# Patient Record
Sex: FEMALE | Race: WHITE | NOT HISPANIC OR LATINO | Employment: OTHER | URBAN - METROPOLITAN AREA
[De-identification: names, ages, dates, MRNs, and addresses within clinical notes are randomized per-mention and may not be internally consistent; named-entity substitution may affect disease eponyms.]

---

## 2018-06-16 ENCOUNTER — APPOINTMENT (OUTPATIENT)
Dept: GENERAL RADIOLOGY | Facility: HOSPITAL | Age: 83
End: 2018-06-16

## 2018-06-16 ENCOUNTER — HOSPITAL ENCOUNTER (INPATIENT)
Facility: HOSPITAL | Age: 83
LOS: 4 days | Discharge: HOME | End: 2018-06-20
Attending: EMERGENCY MEDICINE | Admitting: INTERNAL MEDICINE

## 2018-06-16 DIAGNOSIS — A41.9 SEPSIS, DUE TO UNSPECIFIED ORGANISM: Primary | ICD-10-CM

## 2018-06-16 DIAGNOSIS — Z74.09 IMPAIRED FUNCTIONAL MOBILITY, BALANCE, GAIT, AND ENDURANCE: ICD-10-CM

## 2018-06-16 DIAGNOSIS — R41.82 ALTERED MENTAL STATUS, UNSPECIFIED ALTERED MENTAL STATUS TYPE: ICD-10-CM

## 2018-06-16 PROBLEM — A40.3 SEPSIS DUE TO PNEUMOCOCCUS (HCC): Status: ACTIVE | Noted: 2018-06-16

## 2018-06-16 PROBLEM — A40.3 SEPSIS DUE TO STREPTOCOCCUS PNEUMONIAE (HCC): Status: ACTIVE | Noted: 2018-06-16

## 2018-06-16 PROBLEM — J98.11 ATELECTASIS OF BOTH LUNGS: Status: ACTIVE | Noted: 2018-06-16

## 2018-06-16 PROBLEM — D51.9 ANEMIA DUE TO VITAMIN B12 DEFICIENCY: Status: ACTIVE | Noted: 2018-06-16

## 2018-06-16 LAB
ALBUMIN SERPL-MCNC: 4.09 G/DL (ref 3.2–4.8)
ALBUMIN/GLOB SERPL: 1.5 G/DL (ref 1.5–2.5)
ALP SERPL-CCNC: 64 U/L (ref 25–100)
ALT SERPL W P-5'-P-CCNC: 14 U/L (ref 7–40)
ANION GAP SERPL CALCULATED.3IONS-SCNC: 6 MMOL/L (ref 3–11)
AST SERPL-CCNC: 22 U/L (ref 0–33)
BACTERIA UR QL AUTO: NORMAL /HPF
BASOPHILS # BLD AUTO: 0.03 10*3/MM3 (ref 0–0.2)
BASOPHILS NFR BLD AUTO: 0.3 % (ref 0–1)
BILIRUB SERPL-MCNC: 1.1 MG/DL (ref 0.3–1.2)
BILIRUB UR QL STRIP: NEGATIVE
BUN BLD-MCNC: 21 MG/DL (ref 9–23)
BUN/CREAT SERPL: 19.6 (ref 7–25)
CALCIUM SPEC-SCNC: 8.9 MG/DL (ref 8.7–10.4)
CHLORIDE SERPL-SCNC: 107 MMOL/L (ref 99–109)
CLARITY UR: CLEAR
CO2 SERPL-SCNC: 26 MMOL/L (ref 20–31)
COLOR UR: YELLOW
CREAT BLD-MCNC: 1.07 MG/DL (ref 0.6–1.3)
D-LACTATE SERPL-SCNC: 1.4 MMOL/L (ref 0.5–2)
DEPRECATED RDW RBC AUTO: 65 FL (ref 37–54)
EOSINOPHIL # BLD AUTO: 0.05 10*3/MM3 (ref 0–0.3)
EOSINOPHIL NFR BLD AUTO: 0.6 % (ref 0–3)
ERYTHROCYTE [DISTWIDTH] IN BLOOD BY AUTOMATED COUNT: 16.9 % (ref 11.3–14.5)
GFR SERPL CREATININE-BSD FRML MDRD: 48 ML/MIN/1.73
GFR SERPL CREATININE-BSD FRML MDRD: 58 ML/MIN/1.73
GLOBULIN UR ELPH-MCNC: 2.7 GM/DL
GLUCOSE BLD-MCNC: 116 MG/DL (ref 70–100)
GLUCOSE UR STRIP-MCNC: NEGATIVE MG/DL
HCT VFR BLD AUTO: 24.1 % (ref 34.5–44)
HCT VFR BLD AUTO: 28.5 % (ref 34.5–44)
HGB BLD-MCNC: 8 G/DL (ref 11.5–15.5)
HGB BLD-MCNC: 9.5 G/DL (ref 11.5–15.5)
HGB UR QL STRIP.AUTO: NEGATIVE
HOLD SPECIMEN: NORMAL
HOLD SPECIMEN: NORMAL
HYALINE CASTS UR QL AUTO: NORMAL /LPF
IMM GRANULOCYTES # BLD: 0.02 10*3/MM3 (ref 0–0.03)
IMM GRANULOCYTES NFR BLD: 0.2 % (ref 0–0.6)
KETONES UR QL STRIP: NEGATIVE
LEUKOCYTE ESTERASE UR QL STRIP.AUTO: NEGATIVE
LYMPHOCYTES # BLD AUTO: 0.47 10*3/MM3 (ref 0.6–4.8)
LYMPHOCYTES NFR BLD AUTO: 5.4 % (ref 24–44)
MCH RBC QN AUTO: 35.8 PG (ref 27–31)
MCHC RBC AUTO-ENTMCNC: 33.3 G/DL (ref 32–36)
MCV RBC AUTO: 107.5 FL (ref 80–99)
MONOCYTES # BLD AUTO: 0.59 10*3/MM3 (ref 0–1)
MONOCYTES NFR BLD AUTO: 6.8 % (ref 0–12)
NEUTROPHILS # BLD AUTO: 7.57 10*3/MM3 (ref 1.5–8.3)
NEUTROPHILS NFR BLD AUTO: 86.9 % (ref 41–71)
NITRITE UR QL STRIP: NEGATIVE
PH UR STRIP.AUTO: 6 [PH] (ref 5–8)
PLATELET # BLD AUTO: 152 10*3/MM3 (ref 150–450)
PMV BLD AUTO: 11.8 FL (ref 6–12)
POTASSIUM BLD-SCNC: 4.4 MMOL/L (ref 3.5–5.5)
PROCALCITONIN SERPL-MCNC: <0.05 NG/ML
PROT SERPL-MCNC: 6.8 G/DL (ref 5.7–8.2)
PROT UR QL STRIP: ABNORMAL
RBC # BLD AUTO: 2.65 10*6/MM3 (ref 3.89–5.14)
RBC # UR: NORMAL /HPF
REF LAB TEST METHOD: NORMAL
SODIUM BLD-SCNC: 139 MMOL/L (ref 132–146)
SP GR UR STRIP: 1.02 (ref 1–1.03)
SQUAMOUS #/AREA URNS HPF: NORMAL /HPF
TROPONIN I SERPL-MCNC: 0.03 NG/ML (ref 0–0.07)
UROBILINOGEN UR QL STRIP: ABNORMAL
WBC NRBC COR # BLD: 8.71 10*3/MM3 (ref 3.5–10.8)
WBC UR QL AUTO: NORMAL /HPF
WHOLE BLOOD HOLD SPECIMEN: NORMAL
WHOLE BLOOD HOLD SPECIMEN: NORMAL

## 2018-06-16 PROCEDURE — 99285 EMERGENCY DEPT VISIT HI MDM: CPT

## 2018-06-16 PROCEDURE — 25010000002 VANCOMYCIN: Performed by: EMERGENCY MEDICINE

## 2018-06-16 PROCEDURE — 25010000002 PIPERACILLIN SOD-TAZOBACTAM PER 1 G: Performed by: EMERGENCY MEDICINE

## 2018-06-16 PROCEDURE — 87486 CHLMYD PNEUM DNA AMP PROBE: CPT | Performed by: FAMILY MEDICINE

## 2018-06-16 PROCEDURE — 25010000002 PIPERACILLIN SOD-TAZOBACTAM PER 1 G: Performed by: FAMILY MEDICINE

## 2018-06-16 PROCEDURE — 94799 UNLISTED PULMONARY SVC/PX: CPT

## 2018-06-16 PROCEDURE — 94640 AIRWAY INHALATION TREATMENT: CPT

## 2018-06-16 PROCEDURE — 73521 X-RAY EXAM HIPS BI 2 VIEWS: CPT

## 2018-06-16 PROCEDURE — 84145 PROCALCITONIN (PCT): CPT | Performed by: EMERGENCY MEDICINE

## 2018-06-16 PROCEDURE — 85014 HEMATOCRIT: CPT | Performed by: FAMILY MEDICINE

## 2018-06-16 PROCEDURE — 87633 RESP VIRUS 12-25 TARGETS: CPT | Performed by: FAMILY MEDICINE

## 2018-06-16 PROCEDURE — 87798 DETECT AGENT NOS DNA AMP: CPT | Performed by: FAMILY MEDICINE

## 2018-06-16 PROCEDURE — 87040 BLOOD CULTURE FOR BACTERIA: CPT | Performed by: EMERGENCY MEDICINE

## 2018-06-16 PROCEDURE — 85025 COMPLETE CBC W/AUTO DIFF WBC: CPT | Performed by: EMERGENCY MEDICINE

## 2018-06-16 PROCEDURE — 93005 ELECTROCARDIOGRAM TRACING: CPT | Performed by: EMERGENCY MEDICINE

## 2018-06-16 PROCEDURE — 84484 ASSAY OF TROPONIN QUANT: CPT

## 2018-06-16 PROCEDURE — 81001 URINALYSIS AUTO W/SCOPE: CPT | Performed by: EMERGENCY MEDICINE

## 2018-06-16 PROCEDURE — 99223 1ST HOSP IP/OBS HIGH 75: CPT | Performed by: FAMILY MEDICINE

## 2018-06-16 PROCEDURE — 71045 X-RAY EXAM CHEST 1 VIEW: CPT

## 2018-06-16 PROCEDURE — 25010000002 HEPARIN (PORCINE) PER 1000 UNITS: Performed by: FAMILY MEDICINE

## 2018-06-16 PROCEDURE — 80053 COMPREHEN METABOLIC PANEL: CPT | Performed by: EMERGENCY MEDICINE

## 2018-06-16 PROCEDURE — 83605 ASSAY OF LACTIC ACID: CPT | Performed by: EMERGENCY MEDICINE

## 2018-06-16 PROCEDURE — 85018 HEMOGLOBIN: CPT | Performed by: FAMILY MEDICINE

## 2018-06-16 PROCEDURE — 87581 M.PNEUMON DNA AMP PROBE: CPT | Performed by: FAMILY MEDICINE

## 2018-06-16 PROCEDURE — P9612 CATHETERIZE FOR URINE SPEC: HCPCS

## 2018-06-16 RX ORDER — POLYETHYLENE GLYCOL 3350 17 G/17G
17 POWDER, FOR SOLUTION ORAL DAILY
Status: ON HOLD | COMMUNITY
End: 2018-06-20

## 2018-06-16 RX ORDER — MIRTAZAPINE 15 MG/1
15 TABLET, FILM COATED ORAL NIGHTLY
Status: DISCONTINUED | OUTPATIENT
Start: 2018-06-16 | End: 2018-06-20 | Stop reason: HOSPADM

## 2018-06-16 RX ORDER — OXYBUTYNIN CHLORIDE 5 MG/1
5 TABLET ORAL DAILY
Status: ON HOLD | COMMUNITY
End: 2018-06-20

## 2018-06-16 RX ORDER — SODIUM CHLORIDE 0.9 % (FLUSH) 0.9 %
10 SYRINGE (ML) INJECTION AS NEEDED
Status: DISCONTINUED | OUTPATIENT
Start: 2018-06-16 | End: 2018-06-20 | Stop reason: HOSPADM

## 2018-06-16 RX ORDER — BISACODYL 10 MG
10 SUPPOSITORY, RECTAL RECTAL DAILY
Status: ON HOLD | COMMUNITY
End: 2018-06-20

## 2018-06-16 RX ORDER — SODIUM CHLORIDE 9 MG/ML
75 INJECTION, SOLUTION INTRAVENOUS CONTINUOUS
Status: DISCONTINUED | OUTPATIENT
Start: 2018-06-16 | End: 2018-06-19

## 2018-06-16 RX ORDER — ACETAMINOPHEN 650 MG/1
650 SUPPOSITORY RECTAL ONCE
Status: COMPLETED | OUTPATIENT
Start: 2018-06-16 | End: 2018-06-16

## 2018-06-16 RX ORDER — SODIUM CHLORIDE 0.9 % (FLUSH) 0.9 %
1-10 SYRINGE (ML) INJECTION AS NEEDED
Status: DISCONTINUED | OUTPATIENT
Start: 2018-06-16 | End: 2018-06-20 | Stop reason: HOSPADM

## 2018-06-16 RX ORDER — SODIUM PHOSPHATE, DIBASIC AND SODIUM PHOSPHATE, MONOBASIC 7; 19 G/133ML; G/133ML
1 ENEMA RECTAL DAILY PRN
COMMUNITY
End: 2018-06-20 | Stop reason: HOSPADM

## 2018-06-16 RX ORDER — MEMANTINE HYDROCHLORIDE 28 MG/1
28 CAPSULE, EXTENDED RELEASE ORAL DAILY
Status: ON HOLD | COMMUNITY
End: 2018-06-20

## 2018-06-16 RX ORDER — DOCUSATE SODIUM 100 MG/1
100 CAPSULE, LIQUID FILLED ORAL 2 TIMES DAILY
Status: DISCONTINUED | OUTPATIENT
Start: 2018-06-16 | End: 2018-06-20 | Stop reason: HOSPADM

## 2018-06-16 RX ORDER — FAMOTIDINE 20 MG/1
40 TABLET, FILM COATED ORAL DAILY
Status: DISCONTINUED | OUTPATIENT
Start: 2018-06-16 | End: 2018-06-18

## 2018-06-16 RX ORDER — ALBUTEROL SULFATE 2.5 MG/3ML
2.5 SOLUTION RESPIRATORY (INHALATION) EVERY 4 HOURS PRN
Status: ON HOLD | COMMUNITY
End: 2018-06-20

## 2018-06-16 RX ORDER — DONEPEZIL HYDROCHLORIDE 5 MG/1
5 TABLET, FILM COATED ORAL NIGHTLY
Status: ON HOLD | COMMUNITY
End: 2018-06-20

## 2018-06-16 RX ORDER — ACETAMINOPHEN 325 MG/1
650 TABLET ORAL EVERY 4 HOURS PRN
Status: DISCONTINUED | OUTPATIENT
Start: 2018-06-16 | End: 2018-06-20 | Stop reason: HOSPADM

## 2018-06-16 RX ORDER — MIRTAZAPINE 15 MG/1
15 TABLET, FILM COATED ORAL NIGHTLY
Status: ON HOLD | COMMUNITY
End: 2018-06-20

## 2018-06-16 RX ORDER — DOCUSATE SODIUM 100 MG/1
100 CAPSULE, LIQUID FILLED ORAL 2 TIMES DAILY
Status: ON HOLD | COMMUNITY
End: 2018-06-20

## 2018-06-16 RX ORDER — IPRATROPIUM BROMIDE AND ALBUTEROL SULFATE 2.5; .5 MG/3ML; MG/3ML
3 SOLUTION RESPIRATORY (INHALATION)
Status: DISCONTINUED | OUTPATIENT
Start: 2018-06-16 | End: 2018-06-19

## 2018-06-16 RX ORDER — MEMANTINE HYDROCHLORIDE 10 MG/1
10 TABLET ORAL EVERY 12 HOURS SCHEDULED
Status: DISCONTINUED | OUTPATIENT
Start: 2018-06-16 | End: 2018-06-20 | Stop reason: HOSPADM

## 2018-06-16 RX ORDER — DONEPEZIL HYDROCHLORIDE 10 MG/1
5 TABLET, FILM COATED ORAL NIGHTLY
Status: DISCONTINUED | OUTPATIENT
Start: 2018-06-16 | End: 2018-06-20 | Stop reason: HOSPADM

## 2018-06-16 RX ORDER — HEPARIN SODIUM 5000 [USP'U]/ML
5000 INJECTION, SOLUTION INTRAVENOUS; SUBCUTANEOUS EVERY 12 HOURS SCHEDULED
Status: DISCONTINUED | OUTPATIENT
Start: 2018-06-16 | End: 2018-06-20 | Stop reason: HOSPADM

## 2018-06-16 RX ORDER — OXYBUTYNIN CHLORIDE 5 MG/1
5 TABLET ORAL DAILY
Status: DISCONTINUED | OUTPATIENT
Start: 2018-06-16 | End: 2018-06-20 | Stop reason: HOSPADM

## 2018-06-16 RX ADMIN — DOCUSATE SODIUM 100 MG: 100 CAPSULE, LIQUID FILLED ORAL at 20:49

## 2018-06-16 RX ADMIN — HEPARIN SODIUM 5000 UNITS: 5000 INJECTION, SOLUTION INTRAVENOUS; SUBCUTANEOUS at 20:49

## 2018-06-16 RX ADMIN — MEMANTINE 10 MG: 10 TABLET ORAL at 20:49

## 2018-06-16 RX ADMIN — MIRTAZAPINE 15 MG: 15 TABLET, FILM COATED ORAL at 20:49

## 2018-06-16 RX ADMIN — SODIUM CHLORIDE 1000 ML: 9 INJECTION, SOLUTION INTRAVENOUS at 12:35

## 2018-06-16 RX ADMIN — IPRATROPIUM BROMIDE AND ALBUTEROL SULFATE 3 ML: 2.5; .5 SOLUTION RESPIRATORY (INHALATION) at 16:20

## 2018-06-16 RX ADMIN — DONEPEZIL HYDROCHLORIDE 5 MG: 10 TABLET, FILM COATED ORAL at 20:49

## 2018-06-16 RX ADMIN — ACETAMINOPHEN 650 MG: 650 SUPPOSITORY RECTAL at 12:35

## 2018-06-16 RX ADMIN — TAZOBACTAM SODIUM AND PIPERACILLIN SODIUM 4.5 G: 500; 4 INJECTION, SOLUTION INTRAVENOUS at 12:04

## 2018-06-16 RX ADMIN — IPRATROPIUM BROMIDE AND ALBUTEROL SULFATE 3 ML: 2.5; .5 SOLUTION RESPIRATORY (INHALATION) at 19:48

## 2018-06-16 RX ADMIN — FAMOTIDINE 40 MG: 20 TABLET ORAL at 18:52

## 2018-06-16 RX ADMIN — VANCOMYCIN HYDROCHLORIDE 1250 MG: 10 INJECTION, POWDER, LYOPHILIZED, FOR SOLUTION INTRAVENOUS at 12:38

## 2018-06-16 RX ADMIN — SODIUM CHLORIDE 75 ML/HR: 9 INJECTION, SOLUTION INTRAVENOUS at 18:48

## 2018-06-16 RX ADMIN — OXYBUTYNIN CHLORIDE 5 MG: 5 TABLET ORAL at 18:52

## 2018-06-16 RX ADMIN — TAZOBACTAM SODIUM AND PIPERACILLIN SODIUM 3.38 G: 375; 3 INJECTION, SOLUTION INTRAVENOUS at 18:49

## 2018-06-16 NOTE — ED PROVIDER NOTES
Subjective   Ms. Arabella Gomes is a 89 y.o. female who presents to the ED with c/o R hip pain post fall. Pt is resident at the [facility] and staff reports pt had an unwitnessed fall this morning and post fall she was complaining of R hip pain, however she was complaining of this before the fall as well. She also presents with cough and a fever at 101.1. Hx limited secondary to pt's mental status. At baseline pt is confused and she is unable to answer orientation questions in ED now.                             History provided by:  Nursing home  History limited by:  Mental status change  Lower Extremity Issue   Location:  Hip  Injury: yes    Mechanism of injury: fall    Hip location:  R hip  Pain details:     Timing:  Constant  Relieved by:  None tried  Worsened by:  Nothing  Ineffective treatments:  None tried  Associated symptoms: fever        Review of Systems   Unable to perform ROS: Mental status change   Constitutional: Positive for fever.   Respiratory: Positive for cough.    Musculoskeletal: Positive for arthralgias (R hip pain).       No past medical history on file.    No Known Allergies    No past surgical history on file.    No family history on file.    Social History     Social History   • Marital status:      Social History Main Topics   • Smoking status: Never Smoker   • Alcohol use No   • Drug use: Unknown     Other Topics Concern   • Not on file         Objective   Physical Exam   Constitutional: She appears well-developed and well-nourished. No distress.   HENT:   Head: Normocephalic and atraumatic.   Right Ear: External ear normal.   Left Ear: External ear normal.   Nose: Nose normal.   Eyes: Conjunctivae are normal. No scleral icterus.   Neck: Normal range of motion.   Cardiovascular: Normal rate, regular rhythm and normal heart sounds.  Exam reveals no gallop and no friction rub.    No murmur heard.  Pulmonary/Chest: Effort normal. No respiratory distress. She has no wheezes. She has  rhonchi. She has rales. She exhibits no tenderness.   Coarse rales/rhonchi in left mid lung field and left lower lung field with occasional dry sounding cough.    Abdominal: Soft. Bowel sounds are normal.   Musculoskeletal: Normal range of motion. She exhibits no edema (no peripheral edema), tenderness or deformity.   Full ROM without any obvious tenderness with maniputation of all joints of BLE/pelvis    Neurological: She is alert.   She opens to eyes to verbal stimuli but does not answer orientation questions    Skin: Skin is warm and dry. No rash noted. She is not diaphoretic.   Skin is very warm.    Psychiatric: She has a normal mood and affect. Her behavior is normal.   Nursing note and vitals reviewed.      Critical Care  Performed by: DAVY LANG  Authorized by: DAVY LANG     Critical care provider statement:     Critical care time (minutes):  30    Critical care time was exclusive of:  Separately billable procedures and treating other patients    Critical care was necessary to treat or prevent imminent or life-threatening deterioration of the following conditions:  Sepsis    Critical care was time spent personally by me on the following activities:  Evaluation of patient's response to treatment, examination of patient, obtaining history from patient or surrogate, ordering and performing treatments and interventions, ordering and review of laboratory studies, ordering and review of radiographic studies, re-evaluation of patient's condition, development of treatment plan with patient or surrogate, pulse oximetry and discussions with consultants               ED Course  ED Course as of Jun 16 1238   Sat Jun 16, 2018   1216 I paged Dr. Reeves for admission.  [MS]   1236 Dr. Lang spoke with Dr. Reeves who will admit  [AT]      ED Course User Index  [AT] Zia Posadas  [MS] Davy Lang MD     Recent Results (from the past 24 hour(s))   Urinalysis - Urine, Clean Catch    Collection Time:  06/16/18 11:23 AM   Result Value Ref Range    Color, UA Yellow Yellow, Straw    Appearance, UA Clear Clear    pH, UA 6.0 5.0 - 8.0    Specific Gravity, UA 1.019 1.001 - 1.030    Glucose, UA Negative Negative    Ketones, UA Negative Negative    Bilirubin, UA Negative Negative    Blood, UA Negative Negative    Protein, UA Trace (A) Negative    Leuk Esterase, UA Negative Negative    Nitrite, UA Negative Negative    Urobilinogen, UA 1.0 E.U./dL 0.2 - 1.0 E.U./dL   Urinalysis, Microscopic Only - Urine, Clean Catch    Collection Time: 06/16/18 11:23 AM   Result Value Ref Range    RBC, UA 0-2 None Seen, 0-2 /HPF    WBC, UA None Seen None Seen, 0-2 /HPF    Bacteria, UA None Seen None Seen, Trace /HPF    Squamous Epithelial Cells, UA 0-2 None Seen, 0-2 /HPF    Hyaline Casts, UA 0-6 0 - 6 /LPF    Methodology Automated Microscopy    Comprehensive Metabolic Panel    Collection Time: 06/16/18 11:34 AM   Result Value Ref Range    Glucose 116 (H) 70 - 100 mg/dL    BUN 21 9 - 23 mg/dL    Creatinine 1.07 0.60 - 1.30 mg/dL    Sodium 139 132 - 146 mmol/L    Potassium 4.4 3.5 - 5.5 mmol/L    Chloride 107 99 - 109 mmol/L    CO2 26.0 20.0 - 31.0 mmol/L    Calcium 8.9 8.7 - 10.4 mg/dL    Total Protein 6.8 5.7 - 8.2 g/dL    Albumin 4.09 3.20 - 4.80 g/dL    ALT (SGPT) 14 7 - 40 U/L    AST (SGOT) 22 0 - 33 U/L    Alkaline Phosphatase 64 25 - 100 U/L    Total Bilirubin 1.1 0.3 - 1.2 mg/dL    eGFR Non African Amer 48 (L) >60 mL/min/1.73    eGFR  African Amer 58 (L) >60 mL/min/1.73    Globulin 2.7 gm/dL    A/G Ratio 1.5 1.5 - 2.5 g/dL    BUN/Creatinine Ratio 19.6 7.0 - 25.0    Anion Gap 6.0 3.0 - 11.0 mmol/L   Lactic Acid, Plasma    Collection Time: 06/16/18 11:34 AM   Result Value Ref Range    Lactate 1.4 0.5 - 2.0 mmol/L   CBC Auto Differential    Collection Time: 06/16/18 11:34 AM   Result Value Ref Range    WBC 8.71 3.50 - 10.80 10*3/mm3    RBC 2.65 (L) 3.89 - 5.14 10*6/mm3    Hemoglobin 9.5 (L) 11.5 - 15.5 g/dL    Hematocrit 28.5 (L) 34.5  - 44.0 %    .5 (H) 80.0 - 99.0 fL    MCH 35.8 (H) 27.0 - 31.0 pg    MCHC 33.3 32.0 - 36.0 g/dL    RDW 16.9 (H) 11.3 - 14.5 %    RDW-SD 65.0 (H) 37.0 - 54.0 fl    MPV 11.8 6.0 - 12.0 fL    Platelets 152 150 - 450 10*3/mm3    Neutrophil % 86.9 (H) 41.0 - 71.0 %    Lymphocyte % 5.4 (L) 24.0 - 44.0 %    Monocyte % 6.8 0.0 - 12.0 %    Eosinophil % 0.6 0.0 - 3.0 %    Basophil % 0.3 0.0 - 1.0 %    Immature Grans % 0.2 0.0 - 0.6 %    Neutrophils, Absolute 7.57 1.50 - 8.30 10*3/mm3    Lymphocytes, Absolute 0.47 (L) 0.60 - 4.80 10*3/mm3    Monocytes, Absolute 0.59 0.00 - 1.00 10*3/mm3    Eosinophils, Absolute 0.05 0.00 - 0.30 10*3/mm3    Basophils, Absolute 0.03 0.00 - 0.20 10*3/mm3    Immature Grans, Absolute 0.02 0.00 - 0.03 10*3/mm3   POC Troponin, Rapid    Collection Time: 06/16/18 11:44 AM   Result Value Ref Range    Troponin I 0.03 0.00 - 0.07 ng/mL     Note: In addition to lab results from this visit, the labs listed above may include labs taken at another facility or during a different encounter within the last 24 hours. Please correlate lab times with ED admission and discharge times for further clarification of the services performed during this visit.    XR Chest 1 View   Preliminary Result   Chronic lung changes including mild asymmetric elevation the   left hemidiaphragm however no focal consolidation to suggest acute   pneumonia.                Vitals:    06/16/18 1058 06/16/18 1100 06/16/18 1110 06/16/18 1121   BP: 126/59 134/65     BP Location: Right arm      Patient Position: Lying      Pulse:   86    Resp:       Temp:    (!) 101.1 °F (38.4 °C)   TempSrc:    Rectal   SpO2:  90%     Weight:       Height:         Medications   sodium chloride 0.9 % flush 10 mL (not administered)   vancomycin 1250 mg/250 mL 0.9% NS IVPB ([facility]) (not administered)   sodium chloride 0.9 % bolus 1,000 mL (1,000 mL Intravenous New Bag 6/16/18 5)   piperacillin-tazobactam (ZOSYN) 4.5 g in iso-osmotic dextrose 100 mL IVPB  (premix) (0 g Intravenous Stopped 6/16/18 1238)   acetaminophen (TYLENOL) suppository 650 mg (650 mg Rectal Given 6/16/18 1235)     ECG/EMG Results (last 24 hours)     ** No results found for the last 24 hours. **        ECG 12 Lead   Final Result   Test Reason : ams   Blood Pressure : **/** mmHG   Vent. Rate : 083 BPM     Atrial Rate : 083 BPM      P-R Int : 206 ms          QRS Dur : 132 ms       QT Int : 418 ms       P-R-T Axes : 088 006 090 degrees      QTc Int : 491 ms      Normal sinus rhythm with sinus arrhythmia   Left bundle branch block   Abnormal ECG   No previous ECGs available   Confirmed by DAVY UMANA MD ([id number]) on 6/16/2018 12:30:45 PM      Referred By:  EDMD           Confirmed By:DAVY UMANA MD      ECG 12 Lead   Preliminary Result   Test Reason : tachycardia   Blood Pressure : **/** mmHG   Vent. Rate : 149 BPM     Atrial Rate : 079 BPM      P-R Int : 000 ms          QRS Dur : 130 ms       QT Int : 322 ms       P-R-T Axes : 000 000 206 degrees      QTc Int : 507 ms      Wide QRS tachycardia   Left bundle branch block   Abnormal ECG   When compared with ECG of 16-JUN-2018 11:50,   Wide QRS tachycardia has replaced Sinus rhythm   Vent. rate has increased BY  66 BPM      Referred By:             Confirmed By:                         MDM    Final diagnoses:   Sepsis, due to unspecified organism   Altered mental status, unspecified altered mental status type       Documentation assistance provided by estevan Posadas.  Information recorded by the scribe was done at my direction and has been verified and validated by me.     Zia Posadas  06/16/18 1144       Zia Posadas  06/16/18 1220       Zia Posadas  06/16/18 1237       Zia Posadas  06/16/18 1238       Davy Umana MD  06/19/18 0026

## 2018-06-16 NOTE — PLAN OF CARE
Problem: Fall Risk (Adult)  Intervention: Reduce Risk/Promote Restraint Free Environment   18 1519   Safety Management   Environmental Safety Modification assistive device/personal items within reach;clutter free environment maintained;lighting adjusted;room near unit station;room organization consistent   Safety Promotion/Fall Prevention activity supervised;elopement precautions initiated;nonskid shoes/slippers when out of bed;safety round/check completed   Prevent  Drop/Fall   Safety/Security Measures bed alarm set;family to remain at bedside     Intervention: Review Medications/Identify Contributors to Fall Risk   18 1519   Safety Management   Medication Review/Management medications reviewed

## 2018-06-16 NOTE — PROGRESS NOTES
"Pharmacy Consult-Vancomycin Dosing  Arabella Gomes is a  89 y.o. female receiving vancomycin therapy.     Indication: sepsis/pneumonia  Consulting Provider: Kranthi TERAN Consult:      Goal Trough: 15-20    Current Antimicrobial Therapy  Anti-Infectives       Ordered     Dose/Rate Route Frequency Start Stop    06/16/18 1549  vancomycin in dextrose 5% 150 mL (VANCOCIN) IVPB 750 mg     Ordering Provider:  Gildardo Crisostomo MD    15 mg/kg × 56.7 kg  over 60 Minutes Intravenous Every 24 Hours 06/17/18 1230 06/26/18 1229    06/16/18 1535  piperacillin-tazobactam (ZOSYN) 3.375 g in iso-osmotic dextrose 50 ml (premix)     Ordering Provider:  Gildardo Crisostomo MD    3.375 g  over 4 Hours Intravenous Every 8 Hours 06/16/18 1800 06/18/18 1759    06/16/18 1535  Pharmacy to dose vancomycin     Ordering Provider:  Gildardo Crisostomo MD     Does not apply Continuous PRN 06/16/18 1535 06/18/18 1534    06/16/18 1128  piperacillin-tazobactam (ZOSYN) 4.5 g in iso-osmotic dextrose 100 mL IVPB (premix)     Ordering Provider:  Shadi Umana MD    4.5 g  over 0.5 Hours Intravenous Once 06/16/18 1130 06/16/18 1238    06/16/18 1128  vancomycin 1250 mg/250 mL 0.9% NS IVPB ([facility])     Ordering Provider:  Shadi Umana MD    20 mg/kg × 56.7 kg Intravenous Once 06/16/18 1130 06/16/18 1424            Allergies  Allergies as of 06/16/2018   • (No Known Allergies)       Labs      Results from last 7 days     Lab Units 06/16/18  1134   BUN mg/dL 21   CREATININE mg/dL 1.07         Results from last 7 days     Lab Units 06/16/18  1134   WBC 10*3/mm3 8.71       Evaluation of Dosing     Last Dose Received in the ED/Outside Facility: 6/16 1238 (vanc 1250 mg)    Ht - 162.6 cm (64\")  Wt - 56.7 kg (125 lb)    Estimated Creatinine Clearance: 30.7 mL/min (by C-G formula based on SCr of 1.07 mg/dL).    Intake & Output (last 3 days)         06/13 0701 - 06/14 0700 06/14 0701 - 06/15 0700 06/15 0701 - 06/16 0700 06/16 0701 - 06/17 0700    IV Piggyback    2600    Total " Intake(mL/kg)    2600 (45.9)    Net       +2600                    Microbiology and Radiology  Microbiology Results (last 10 days)       ** No results found for the last 240 hours. **            Evaluation of Level    No results found for: CAROLA SERRANO VANCORANDOM    Assessment/Plan:    Vancomycin for sepsis/pneumonia.  Vancomycin level 6/17.  RX to continue to follow and adjust as necessary.    Thanks,  Mani Mei [facility]  6/16/2018  3:55 PM

## 2018-06-16 NOTE — PLAN OF CARE
Problem: Skin Injury Risk (Adult)  Intervention: Maintain Head of Bed Elevation Less Than 30 Degrees as Tolerated   06/16/18 1522   Positioning   Head of Bed (HOB) HOB at 30 degrees     Intervention: Prevent/Minimize Shear/Friction Injuries   06/16/18 1522   Skin Interventions   Pressure Reduction Devices pressure-redistributing mattress utilized   Positioning   Positioning/Transfer Devices pillows;in use     Intervention: Prevent or Minimize Pressure   06/16/18 1522   Skin Interventions   Pressure Reduction Techniques frequent weight shift encouraged;heels elevated off bed;pressure points protected;weight shift assistance provided   Positioning   Body Position turned;legs elevated;supine, legs elevated;weight shift assistance provided

## 2018-06-16 NOTE — H&P
"    [facility] Medicine Services  HISTORY AND PHYSICAL    Primary Care Physician: No Known Provider    Subjective     Chief Complaint:  Sepsis    History of Present Illness: This is a 89 year old  female that is accompanied by her brother Toby to [facility] ED from her residential living facility ([facility]) secondary to fever, cough and fall.  She has dementia and her brother provides the history.  He describes several days of a croupy cough that has increased in severity.  He reports that she was identified with associated fever this morning after being found on the floor after a fall.  He is concerned with her right hip.  He reports that her mental status has declined and she seems more \"confused.\"  He has identified chills but no acute dyspnea.  In the ED her temperature was > 101 and oxygen saturations were diminished.  Her chest imaging identified bilateral atelectasis.    Review of Systems   Unable to perform ROS: Dementia      Past Medical History:   Diagnosis Date   • CKD (chronic kidney disease) stage 3, GFR 30-59 ml/min    • Dementia    • LBBB (left bundle branch block)        Past Surgical History:   Procedure Laterality Date   • NO PAST SURGERIES         Family History   Problem Relation Age of Onset   • Heart failure Mother    • Stroke Father      Social History     Social History   • Marital status: Single     Spouse name: N/A   • Number of children: 0   • Years of education: College     Occupational History   •  Retired     Social History Main Topics   • Smoking status: Never Smoker   • Smokeless tobacco: Never Used   • Alcohol use No   • Drug use: No   • Sexual activity: No     Medications:  Reviewed and reconciled    Allergies:  No Known Allergies    Objective     Vital Signs: BP 99/48   Pulse 69   Temp (!) 101.1 °F (38.4 °C) (Rectal)   Resp 18   Ht 162.6 cm (64\")   Wt 56.7 kg (125 lb)   SpO2 91%   BMI 21.46 kg/m²   Body mass index is 21.46 " kg/m².    Physical Exam   Constitutional: She appears well-developed. She appears lethargic. She has a sickly appearance.   HENT:   Head: Normocephalic.   Right Ear: External ear normal. Decreased hearing is noted.   Left Ear: External ear normal. Decreased hearing is noted.   Nose: Nose normal.   Mouth/Throat: Mucous membranes are dry. She has dentures.   Eyes: Conjunctivae and EOM are normal. No scleral icterus.   Neck: Trachea normal. Neck supple. No JVD present. Muscular tenderness present. Carotid bruit is not present. Decreased range of motion present. No thyromegaly present.   Cardiovascular: Normal rate, regular rhythm, normal heart sounds and intact distal pulses.    Pulmonary/Chest: Effort normal. She has decreased breath sounds. She has rhonchi.   Abdominal: Soft. Bowel sounds are normal. There is no hepatosplenomegaly. There is no tenderness.   Musculoskeletal:        Right hip: She exhibits tenderness. She exhibits normal range of motion and no deformity.   Lymphadenopathy:     She has no cervical adenopathy.   Neurological: She appears lethargic. She displays atrophy. No sensory deficit.   Skin: Skin is warm and dry. No rash noted.   Psychiatric: Her affect is blunt. She is slowed. Cognition and memory are impaired. She is noncommunicative.   Nursing note and vitals reviewed.    Results Reviewed:     Results from last 7 days  Lab Units 06/16/18  1134   WBC 10*3/mm3 8.71   HEMOGLOBIN g/dL 9.5*   PLATELETS 10*3/mm3 152       Results from last 7 days  Lab Units 06/16/18  1134   SODIUM mmol/L 139   POTASSIUM mmol/L 4.4   CO2 mmol/L 26.0   CREATININE mg/dL 1.07   GLUCOSE mg/dL 116*   CALCIUM mg/dL 8.9     I have personally reviewed and interpreted available lab data dated 06/16/18.  I have personally reviewed CXR imaging report available and dated 06/16/18 identifying bibasilar atelectasis.  I have personally reviewed ECG report dated 06/16/18 identifying LBBB.   I have personally discussed the case with  the ED physician Dr. Umana and we discussed her sepsis criteria and choice of antibiotics with her residential facility placement.    Assessment / Plan     Assessment/Problem List:   Principal Problem:    Sepsis due to Streptococcus pneumoniae  Active Problems:    Atelectasis of both lungs    Dementia    Anemia due to vitamin B12 deficiency    CKD (chronic kidney disease) stage 3, GFR 30-59 ml/min    Sepsis due to pneumococcus    Plan:  Sepsis due to Pneumonia  --Admit to telemetry  --Continuous pulse oximetry  --Oxygen therapy to maintain saturations  --Duoneb therapy  --Blood cultures ordered  --Zosyn IV  --Vancomycin IV  --Peak and trough levels with pharmacy assisting  --Sputum culture  --Gentle IV fluid hydration  --Creatinine in AM  --Aspiration precautions  --Neurochecks    Anemia with macrocytosis  --Trend H/H  --B12 and iron studies in AM  --Occult stool  --H2 blocker therapy  --Nutrition assessment  --Prealbumin in AM  --Replacement therapy pending results    With her EYG=339, SOFA=9, abrupt change in neurological status and drug therapy requiring intensive monitoring we will admit as inpatient.  I believe this patient meets INPATIENT status due to the need for care which can only be reasonably provided in an hospital setting such as aggressive/expedited ancillary services and/or consultation services and the necessity for IV medications, close physician monitoring and/or the possible need for procedures.  In such, I feel patient’s risk for adverse outcomes and need for care warrant INPATIENT evaluation and predict the patient’s care encounter to likely last beyond 2 midnights.    DVT prophylaxis: Heparin, Scuds  Code Status: Full  Admission Status: Patient will be admitted to [facility]     Electronically signed by Gildardo Crisostomo MD, 06/16/18, 2:46 PM

## 2018-06-17 LAB
ABO GROUP BLD: NORMAL
ABO GROUP BLD: NORMAL
ANION GAP SERPL CALCULATED.3IONS-SCNC: 7 MMOL/L (ref 3–11)
BASOPHILS # BLD AUTO: 0.05 10*3/MM3 (ref 0–0.2)
BASOPHILS NFR BLD AUTO: 1.3 % (ref 0–1)
BLD GP AB SCN SERPL QL: NEGATIVE
BUN BLD-MCNC: 17 MG/DL (ref 9–23)
BUN/CREAT SERPL: 14 (ref 7–25)
CALCIUM SPEC-SCNC: 7.6 MG/DL (ref 8.7–10.4)
CHLORIDE SERPL-SCNC: 108 MMOL/L (ref 99–109)
CO2 SERPL-SCNC: 25 MMOL/L (ref 20–31)
CREAT BLD-MCNC: 1.21 MG/DL (ref 0.6–1.3)
DEPRECATED RDW RBC AUTO: 66.9 FL (ref 37–54)
EOSINOPHIL # BLD AUTO: 0.09 10*3/MM3 (ref 0–0.3)
EOSINOPHIL NFR BLD AUTO: 2.3 % (ref 0–3)
ERYTHROCYTE [DISTWIDTH] IN BLOOD BY AUTOMATED COUNT: 17.5 % (ref 11.3–14.5)
GFR SERPL CREATININE-BSD FRML MDRD: 42 ML/MIN/1.73
GLUCOSE BLD-MCNC: 92 MG/DL (ref 70–100)
HCT VFR BLD AUTO: 22.1 % (ref 34.5–44)
HCT VFR BLD AUTO: 25 % (ref 34.5–44)
HCT VFR BLD AUTO: 25.9 % (ref 34.5–44)
HGB BLD-MCNC: 7.3 G/DL (ref 11.5–15.5)
HGB BLD-MCNC: 8.2 G/DL (ref 11.5–15.5)
HGB BLD-MCNC: 8.4 G/DL (ref 11.5–15.5)
IMM GRANULOCYTES # BLD: 0.01 10*3/MM3 (ref 0–0.03)
IMM GRANULOCYTES NFR BLD: 0.3 % (ref 0–0.6)
IRON 24H UR-MRATE: 15 MCG/DL (ref 50–175)
IRON SATN MFR SERPL: 5 % (ref 15–50)
LYMPHOCYTES # BLD AUTO: 0.47 10*3/MM3 (ref 0.6–4.8)
LYMPHOCYTES NFR BLD AUTO: 12 % (ref 24–44)
MCH RBC QN AUTO: 35.4 PG (ref 27–31)
MCHC RBC AUTO-ENTMCNC: 33 G/DL (ref 32–36)
MCV RBC AUTO: 107.3 FL (ref 80–99)
MONOCYTES # BLD AUTO: 0.42 10*3/MM3 (ref 0–1)
MONOCYTES NFR BLD AUTO: 10.7 % (ref 0–12)
NEUTROPHILS # BLD AUTO: 2.89 10*3/MM3 (ref 1.5–8.3)
NEUTROPHILS NFR BLD AUTO: 73.7 % (ref 41–71)
PLATELET # BLD AUTO: 111 10*3/MM3 (ref 150–450)
PMV BLD AUTO: 11.9 FL (ref 6–12)
POTASSIUM BLD-SCNC: 3.8 MMOL/L (ref 3.5–5.5)
PREALB SERPL-MCNC: 15.7 MG/DL (ref 10–40)
RBC # BLD AUTO: 2.06 10*6/MM3 (ref 3.89–5.14)
RH BLD: POSITIVE
RH BLD: POSITIVE
SODIUM BLD-SCNC: 140 MMOL/L (ref 132–146)
T&S EXPIRATION DATE: NORMAL
T4 FREE SERPL-MCNC: 1.38 NG/DL (ref 0.89–1.76)
TIBC SERPL-MCNC: 280 MCG/DL (ref 250–450)
TSH SERPL DL<=0.05 MIU/L-ACNC: 0.78 MIU/ML (ref 0.35–5.35)
VIT B12 BLD-MCNC: 847 PG/ML (ref 211–911)
WBC NRBC COR # BLD: 3.92 10*3/MM3 (ref 3.5–10.8)

## 2018-06-17 PROCEDURE — 84443 ASSAY THYROID STIM HORMONE: CPT | Performed by: FAMILY MEDICINE

## 2018-06-17 PROCEDURE — 85014 HEMATOCRIT: CPT | Performed by: FAMILY MEDICINE

## 2018-06-17 PROCEDURE — 86900 BLOOD TYPING SEROLOGIC ABO: CPT

## 2018-06-17 PROCEDURE — 94799 UNLISTED PULMONARY SVC/PX: CPT

## 2018-06-17 PROCEDURE — 93005 ELECTROCARDIOGRAM TRACING: CPT | Performed by: NURSE PRACTITIONER

## 2018-06-17 PROCEDURE — 87899 AGENT NOS ASSAY W/OPTIC: CPT | Performed by: INTERNAL MEDICINE

## 2018-06-17 PROCEDURE — 94640 AIRWAY INHALATION TREATMENT: CPT

## 2018-06-17 PROCEDURE — 86901 BLOOD TYPING SEROLOGIC RH(D): CPT | Performed by: FAMILY MEDICINE

## 2018-06-17 PROCEDURE — 84134 ASSAY OF PREALBUMIN: CPT | Performed by: FAMILY MEDICINE

## 2018-06-17 PROCEDURE — 83550 IRON BINDING TEST: CPT | Performed by: FAMILY MEDICINE

## 2018-06-17 PROCEDURE — 87449 NOS EACH ORGANISM AG IA: CPT | Performed by: INTERNAL MEDICINE

## 2018-06-17 PROCEDURE — 86901 BLOOD TYPING SEROLOGIC RH(D): CPT

## 2018-06-17 PROCEDURE — 85025 COMPLETE CBC W/AUTO DIFF WBC: CPT | Performed by: FAMILY MEDICINE

## 2018-06-17 PROCEDURE — 25010000002 VANCOMYCIN PER 500 MG: Performed by: FAMILY MEDICINE

## 2018-06-17 PROCEDURE — 84439 ASSAY OF FREE THYROXINE: CPT | Performed by: FAMILY MEDICINE

## 2018-06-17 PROCEDURE — 99233 SBSQ HOSP IP/OBS HIGH 50: CPT | Performed by: INTERNAL MEDICINE

## 2018-06-17 PROCEDURE — 82607 VITAMIN B-12: CPT | Performed by: FAMILY MEDICINE

## 2018-06-17 PROCEDURE — 86850 RBC ANTIBODY SCREEN: CPT | Performed by: FAMILY MEDICINE

## 2018-06-17 PROCEDURE — 86900 BLOOD TYPING SEROLOGIC ABO: CPT | Performed by: FAMILY MEDICINE

## 2018-06-17 PROCEDURE — 83540 ASSAY OF IRON: CPT | Performed by: FAMILY MEDICINE

## 2018-06-17 PROCEDURE — 93010 ELECTROCARDIOGRAM REPORT: CPT | Performed by: INTERNAL MEDICINE

## 2018-06-17 PROCEDURE — 85018 HEMOGLOBIN: CPT | Performed by: FAMILY MEDICINE

## 2018-06-17 PROCEDURE — 94760 N-INVAS EAR/PLS OXIMETRY 1: CPT

## 2018-06-17 PROCEDURE — 25010000002 HEPARIN (PORCINE) PER 1000 UNITS: Performed by: FAMILY MEDICINE

## 2018-06-17 PROCEDURE — 80048 BASIC METABOLIC PNL TOTAL CA: CPT | Performed by: FAMILY MEDICINE

## 2018-06-17 PROCEDURE — 25010000002 PIPERACILLIN SOD-TAZOBACTAM PER 1 G: Performed by: FAMILY MEDICINE

## 2018-06-17 RX ADMIN — MEMANTINE 10 MG: 10 TABLET ORAL at 08:46

## 2018-06-17 RX ADMIN — SODIUM CHLORIDE 75 ML/HR: 9 INJECTION, SOLUTION INTRAVENOUS at 11:45

## 2018-06-17 RX ADMIN — ACETAMINOPHEN 650 MG: 325 TABLET ORAL at 20:20

## 2018-06-17 RX ADMIN — OXYBUTYNIN CHLORIDE 5 MG: 5 TABLET ORAL at 08:46

## 2018-06-17 RX ADMIN — IPRATROPIUM BROMIDE AND ALBUTEROL SULFATE 3 ML: 2.5; .5 SOLUTION RESPIRATORY (INHALATION) at 09:25

## 2018-06-17 RX ADMIN — FAMOTIDINE 40 MG: 20 TABLET ORAL at 08:46

## 2018-06-17 RX ADMIN — MEMANTINE 10 MG: 10 TABLET ORAL at 20:20

## 2018-06-17 RX ADMIN — DONEPEZIL HYDROCHLORIDE 5 MG: 10 TABLET, FILM COATED ORAL at 20:20

## 2018-06-17 RX ADMIN — HEPARIN SODIUM 5000 UNITS: 5000 INJECTION, SOLUTION INTRAVENOUS; SUBCUTANEOUS at 20:20

## 2018-06-17 RX ADMIN — TAZOBACTAM SODIUM AND PIPERACILLIN SODIUM 3.38 G: 375; 3 INJECTION, SOLUTION INTRAVENOUS at 17:16

## 2018-06-17 RX ADMIN — DOCUSATE SODIUM 100 MG: 100 CAPSULE, LIQUID FILLED ORAL at 20:19

## 2018-06-17 RX ADMIN — IPRATROPIUM BROMIDE AND ALBUTEROL SULFATE 3 ML: 2.5; .5 SOLUTION RESPIRATORY (INHALATION) at 19:39

## 2018-06-17 RX ADMIN — TAZOBACTAM SODIUM AND PIPERACILLIN SODIUM 3.38 G: 375; 3 INJECTION, SOLUTION INTRAVENOUS at 11:08

## 2018-06-17 RX ADMIN — HEPARIN SODIUM 5000 UNITS: 5000 INJECTION, SOLUTION INTRAVENOUS; SUBCUTANEOUS at 08:46

## 2018-06-17 RX ADMIN — SODIUM CHLORIDE 500 ML: 9 INJECTION, SOLUTION INTRAVENOUS at 21:30

## 2018-06-17 RX ADMIN — POLYETHYLENE GLYCOL (3350) 17 G: 17 POWDER, FOR SOLUTION ORAL at 08:47

## 2018-06-17 RX ADMIN — DOCUSATE SODIUM 100 MG: 100 CAPSULE, LIQUID FILLED ORAL at 08:47

## 2018-06-17 RX ADMIN — VANCOMYCIN HYDROCHLORIDE 750 MG: 750 INJECTION, SOLUTION INTRAVENOUS at 14:35

## 2018-06-17 RX ADMIN — IPRATROPIUM BROMIDE AND ALBUTEROL SULFATE 3 ML: 2.5; .5 SOLUTION RESPIRATORY (INHALATION) at 13:11

## 2018-06-17 RX ADMIN — IPRATROPIUM BROMIDE AND ALBUTEROL SULFATE 3 ML: 2.5; .5 SOLUTION RESPIRATORY (INHALATION) at 15:22

## 2018-06-17 RX ADMIN — TAZOBACTAM SODIUM AND PIPERACILLIN SODIUM 3.38 G: 375; 3 INJECTION, SOLUTION INTRAVENOUS at 02:10

## 2018-06-17 RX ADMIN — MIRTAZAPINE 15 MG: 15 TABLET, FILM COATED ORAL at 20:19

## 2018-06-17 NOTE — PROGRESS NOTES
[facility] Medicine Services  PROGRESS NOTE    Patient Name: Arabella Gomes  : 1927  MRN: [id number]    Date of Admission: 2018  Length of Stay: 1  Primary Care Physician: No Known Provider    Subjective   Subjective     CC:  F/u sepsis    HPI:  Pt sleeping and unable to wake this am. Brother at bedside.     Review of Systems  Unable to obtain due to mental status    Otherwise ROS is negative except as mentioned in the HPI.    Objective   Objective     Vital Signs:   Temp:  [98.8 °F (37.1 °C)-101.1 °F (38.4 °C)] 99.6 °F (37.6 °C)  Heart Rate:  [64-86] 71  Resp:  [16-18] 16  BP: ()/(40-99) 141/99        Physical Exam:  Constitutional: No acute distress, sleeping, does not wake with loud name calling  HENT: NCAT, mucous membranes moist  Respiratory: Clear to auscultation bilaterally, respiratory effort normal   Cardiovascular: RRR, no murmurs, rubs, or gallops, palpable pedal pulses bilaterally  Gastrointestinal: Positive bowel sounds, soft, nontender, nondistended  Musculoskeletal: No bilateral ankle edema  Psychiatric: unable to assess  Neurologic: unable to assess  Skin: No rashes  Results Reviewed:  I have personally reviewed current lab, radiology, and data and agree.      Results from last 7 days  Lab Units 18  0539 18  0010 18  1554 18  1134   WBC 10*3/mm3 3.92  --   --  8.71   HEMOGLOBIN g/dL 7.3* 8.2* 8.0* 9.5*   HEMATOCRIT % 22.1* 25.0* 24.1* 28.5*   PLATELETS 10*3/mm3 111*  --   --  152       Results from last 7 days  Lab Units 18  0536 18  1134   SODIUM mmol/L 140 139   POTASSIUM mmol/L 3.8 4.4   CHLORIDE mmol/L 108 107   CO2 mmol/L 25.0 26.0   BUN mg/dL 17 21   CREATININE mg/dL 1.21 1.07   GLUCOSE mg/dL 92 116*   CALCIUM mg/dL 7.6* 8.9   ALT (SGPT) U/L  --  14   AST (SGOT) U/L  --  22     Estimated Creatinine Clearance: 27.1 mL/min (by C-G formula based on SCr of 1.21 mg/dL).  No results found for: BNP  No results found  for: PHART    Microbiology Results Abnormal     Procedure Component Value - Date/Time    Blood Culture - Blood, [[id number]]  (Normal) Collected:  06/16/18 1142    Lab Status:  Preliminary result Specimen:  Blood from Arm, Left Updated:  06/17/18 0015     Blood Culture No growth at less than 24 hours    Blood Culture - Blood, [[id number]]  (Normal) Collected:  06/16/18 1142    Lab Status:  Preliminary result Specimen:  Blood from Arm, Left Updated:  06/17/18 0015     Blood Culture No growth at less than 24 hours          Imaging Results (last 24 hours)     Procedure Component Value Units Date/Time    XR Chest 1 View [[id number]] Collected:  06/16/18 1147     Updated:  06/17/18 0916    Narrative:          EXAMINATION: XR CHEST 1 VW - 6/16/2018     INDICATION: Cough, fever.      COMPARISON: None.     FINDINGS: Cardiac size is borderline enlarged with pulmonary vascularity  in normal limits. Asymmetric elevation of the left hemidiaphragm with  bibasilar atelectasis, however, no focal consolidation. No pneumothorax  or pleural effusion.        Impression:       Chronic lung changes including mild asymmetric elevation of  the left hemidiaphragm, however, no focal consolidation to suggest acute  pneumonia.     DICTATED:   6/16/2018  EDITED/ls :   6/16/2018      This report was finalized on 6/17/2018 9:14 AM by Dr. Daniel Bowling.       XR Hips Bilateral With or Without Pelvis 2 View [[id number]] Collected:  06/16/18 1826     Updated:  06/17/18 0916    Narrative:          EXAMINATION: XR BILATERAL HIPS, W OR WO PELVIS, 2 VIEWS - 6/16/2018     INDICATION: A41.9-Sepsis, unspecified organism; R41.82-Altered mental  status, unspecified.      COMPARISON: None.     FINDINGS: Multiple views of the pelvis and hips reveal degenerative  changes of the lumbosacral region with SI joints symmetric. No displaced  pelvic ring fracture with femoral heads well located in the acetabular  cups. No femoral neck fracture is identified with cortices  preserved  despite decreased bone mineral density throughout. Calcified  phleboliths.           Impression:       No evidence for acute displaced fracture of the pelvis or  femoral necks. Degenerative changes of the lumbosacral region and  bilateral hips, left greater than right.     DICTATED:   6/16/2018  EDITED/ls :   6/16/2018      This report was finalized on 6/17/2018 9:14 AM by Dr. Daniel Bowling.                I have reviewed the medications.    Assessment/Plan   Assessment / Plan     Hospital Problem List     * (Principal)Sepsis due to Streptococcus pneumoniae    Dementia    Atelectasis of both lungs    Anemia due to vitamin B12 deficiency    CKD (chronic kidney disease) stage 3, GFR 30-59 ml/min    Sepsis due to pneumococcus             Brief Hospital Course to date:  Arabella Gomes is a 89 y.o. female with PMH of dementia who resides in NH presents with confusion and fever and upper respiratory complaints. Pt found to be febrile on admission.  CXR and infectious workup thus far unremarkable.       Plan:  --continue Vanc/ Zosyn for possible HCAP although CXR is negative.  If afebrile for next 24 hours, then will de-escalated ABX starting with vanc  --H&H dropped but suspect this is dilutional as all cell lines appear down since admission. Vitamin B12 wnl, iron studies c/w AOCD. Transfuse PRN HgB<7.        DVT Prophylaxis:  mechanical    CODE STATUS: Full Code    Disposition: I expect the patient to be discharged back to NH in TBD days      Electronically signed by Fabi Steiner MD, 06/17/18, 10:53 AM.

## 2018-06-18 LAB
ANION GAP SERPL CALCULATED.3IONS-SCNC: 5 MMOL/L (ref 3–11)
BUN BLD-MCNC: 12 MG/DL (ref 9–23)
BUN/CREAT SERPL: 12.2 (ref 7–25)
CALCIUM SPEC-SCNC: 7.3 MG/DL (ref 8.7–10.4)
CHLORIDE SERPL-SCNC: 114 MMOL/L (ref 99–109)
CO2 SERPL-SCNC: 24 MMOL/L (ref 20–31)
CREAT BLD-MCNC: 0.98 MG/DL (ref 0.6–1.3)
DEPRECATED RDW RBC AUTO: 65.3 FL (ref 37–54)
ERYTHROCYTE [DISTWIDTH] IN BLOOD BY AUTOMATED COUNT: 17.1 % (ref 11.3–14.5)
GFR SERPL CREATININE-BSD FRML MDRD: 53 ML/MIN/1.73
GLUCOSE BLD-MCNC: 93 MG/DL (ref 70–100)
HCT VFR BLD AUTO: 24 % (ref 34.5–44)
HGB BLD-MCNC: 7.9 G/DL (ref 11.5–15.5)
MCH RBC QN AUTO: 36.1 PG (ref 27–31)
MCHC RBC AUTO-ENTMCNC: 32.9 G/DL (ref 32–36)
MCV RBC AUTO: 109.6 FL (ref 80–99)
PLATELET # BLD AUTO: 101 10*3/MM3 (ref 150–450)
PMV BLD AUTO: 11.3 FL (ref 6–12)
POTASSIUM BLD-SCNC: 3.6 MMOL/L (ref 3.5–5.5)
RBC # BLD AUTO: 2.19 10*6/MM3 (ref 3.89–5.14)
SODIUM BLD-SCNC: 143 MMOL/L (ref 132–146)
WBC NRBC COR # BLD: 3.16 10*3/MM3 (ref 3.5–10.8)

## 2018-06-18 PROCEDURE — 85027 COMPLETE CBC AUTOMATED: CPT | Performed by: INTERNAL MEDICINE

## 2018-06-18 PROCEDURE — 25010000002 PIPERACILLIN SOD-TAZOBACTAM PER 1 G

## 2018-06-18 PROCEDURE — 80048 BASIC METABOLIC PNL TOTAL CA: CPT | Performed by: INTERNAL MEDICINE

## 2018-06-18 PROCEDURE — 25010000002 PIPERACILLIN SOD-TAZOBACTAM PER 1 G: Performed by: FAMILY MEDICINE

## 2018-06-18 PROCEDURE — 99232 SBSQ HOSP IP/OBS MODERATE 35: CPT | Performed by: INTERNAL MEDICINE

## 2018-06-18 PROCEDURE — 94640 AIRWAY INHALATION TREATMENT: CPT

## 2018-06-18 PROCEDURE — 94799 UNLISTED PULMONARY SVC/PX: CPT

## 2018-06-18 PROCEDURE — 25010000002 HEPARIN (PORCINE) PER 1000 UNITS: Performed by: FAMILY MEDICINE

## 2018-06-18 RX ORDER — FAMOTIDINE 20 MG/1
20 TABLET, FILM COATED ORAL DAILY
Status: DISCONTINUED | OUTPATIENT
Start: 2018-06-19 | End: 2018-06-20 | Stop reason: HOSPADM

## 2018-06-18 RX ADMIN — DOCUSATE SODIUM 100 MG: 100 CAPSULE, LIQUID FILLED ORAL at 09:16

## 2018-06-18 RX ADMIN — IPRATROPIUM BROMIDE AND ALBUTEROL SULFATE 3 ML: 2.5; .5 SOLUTION RESPIRATORY (INHALATION) at 11:59

## 2018-06-18 RX ADMIN — HEPARIN SODIUM 5000 UNITS: 5000 INJECTION, SOLUTION INTRAVENOUS; SUBCUTANEOUS at 09:15

## 2018-06-18 RX ADMIN — MEMANTINE 10 MG: 10 TABLET ORAL at 09:16

## 2018-06-18 RX ADMIN — IPRATROPIUM BROMIDE AND ALBUTEROL SULFATE 3 ML: 2.5; .5 SOLUTION RESPIRATORY (INHALATION) at 16:04

## 2018-06-18 RX ADMIN — TAZOBACTAM SODIUM AND PIPERACILLIN SODIUM 3.38 G: 375; 3 INJECTION, SOLUTION INTRAVENOUS at 09:15

## 2018-06-18 RX ADMIN — FAMOTIDINE 40 MG: 20 TABLET ORAL at 09:16

## 2018-06-18 RX ADMIN — POLYETHYLENE GLYCOL (3350) 17 G: 17 POWDER, FOR SOLUTION ORAL at 09:16

## 2018-06-18 RX ADMIN — HEPARIN SODIUM 5000 UNITS: 5000 INJECTION, SOLUTION INTRAVENOUS; SUBCUTANEOUS at 22:46

## 2018-06-18 RX ADMIN — MEMANTINE 10 MG: 10 TABLET ORAL at 22:47

## 2018-06-18 RX ADMIN — DONEPEZIL HYDROCHLORIDE 10 MG: 10 TABLET, FILM COATED ORAL at 22:46

## 2018-06-18 RX ADMIN — MIRTAZAPINE 15 MG: 15 TABLET, FILM COATED ORAL at 22:46

## 2018-06-18 RX ADMIN — OXYBUTYNIN CHLORIDE 5 MG: 5 TABLET ORAL at 09:16

## 2018-06-18 RX ADMIN — TAZOBACTAM SODIUM AND PIPERACILLIN SODIUM 3.38 G: 375; 3 INJECTION, SOLUTION INTRAVENOUS at 17:20

## 2018-06-18 RX ADMIN — TAZOBACTAM SODIUM AND PIPERACILLIN SODIUM 3.38 G: 375; 3 INJECTION, SOLUTION INTRAVENOUS at 02:50

## 2018-06-18 RX ADMIN — IPRATROPIUM BROMIDE AND ALBUTEROL SULFATE 3 ML: 2.5; .5 SOLUTION RESPIRATORY (INHALATION) at 19:45

## 2018-06-18 RX ADMIN — IPRATROPIUM BROMIDE AND ALBUTEROL SULFATE 3 ML: 2.5; .5 SOLUTION RESPIRATORY (INHALATION) at 07:39

## 2018-06-18 NOTE — PROGRESS NOTES
Continued Stay Note  [facility]     Patient Name: Arabella Gomes  MRN: [id number]  Today's Date: 6/18/2018    Admit Date: 6/16/2018          Discharge Plan     Row Name 06/18/18 1629       Plan    Plan Back to facility    Plan Comments Currently, no family present. Nursing tells me they will be here in am and will see them then.              Discharge Codes    No documentation.       Expected Discharge Date and Time     Expected Discharge Date Expected Discharge Time    Jun 20, 2018             Hilda Monge RN

## 2018-06-18 NOTE — SIGNIFICANT NOTE
[facility] Medicine Services  CROSS COVER NOTE        EVENT: Code Status Change    OBJECTIVE DATA:  Vitals:    06/17/18 1939   BP:    Pulse: 76   Resp: 18   Temp:    SpO2: 93%     ACTION TAKEN:  Family at bedside, Brother, POA, reports the patient has advanced directives, and he will bring them in. He reports the patient does not want to be resuscitated. Code status changed in computer      FOLLOW UP REQUIRED: Ensure family brings Advance Directive and POA paperwork for chart.     Electronically signed by TERESA Westbrook, 06/17/18, 10:59 PM.

## 2018-06-18 NOTE — PROGRESS NOTES
[facility] Medicine Services  PROGRESS NOTE    Patient Name: Arabella Gomes  : 1927  MRN: [id number]    Date of Admission: 2018  Length of Stay: 2  Primary Care Physician: No Known Provider    Subjective   Subjective     CC:  F/u sepsis    HPI:  Pt sitting up in bed and talking with family this am.  Pleasantly confused    Review of Systems  Unable to obtain due to mental status/dementia    Otherwise ROS is negative except as mentioned in the HPI.    Objective   Objective     Vital Signs:   Temp:  [98.4 °F (36.9 °C)] 98.4 °F (36.9 °C)  Heart Rate:  [] 130  Resp:  [16-18] 18  BP: (143)/(108) 143/108        Physical Exam:  Constitutional: No acute distress, awake alert  HENT: NCAT, mucous membranes moist  Respiratory: Clear to auscultation bilaterally, respiratory effort normal   Cardiovascular: RRR, no murmurs, rubs, or gallops, palpable pedal pulses bilaterally  Gastrointestinal: Positive bowel sounds, soft, nontender, nondistended  Musculoskeletal: No bilateral ankle edema  Psychiatric: normal affect  Neurologic: no focal deficits, alert, oriented to person, not place nor time, GRISSOM  Skin: No rashes  Results Reviewed:  I have personally reviewed current lab, radiology, and data and agree.      Results from last 7 days  Lab Units 18  0406 18  0539  18  1134   WBC 10*3/mm3 3.16*  --  3.92  --  8.71   HEMOGLOBIN g/dL 7.9* 8.4* 7.3*  < > 9.5*   HEMATOCRIT % 24.0* 25.9* 22.1*  < > 28.5*   PLATELETS 10*3/mm3 101*  --  111*  --  152   < > = values in this interval not displayed.    Results from last 7 days  Lab Units 18  0406 18  0536 18  1134   SODIUM mmol/L 143 140 139   POTASSIUM mmol/L 3.6 3.8 4.4   CHLORIDE mmol/L 114* 108 107   CO2 mmol/L 24.0 25.0 26.0   BUN mg/dL 12 17 21   CREATININE mg/dL 0.98 1.21 1.07   GLUCOSE mg/dL 93 92 116*   CALCIUM mg/dL 7.3* 7.6* 8.9   ALT (SGPT) U/L  --   --  14   AST (SGOT) U/L  --   --  22      Estimated Creatinine Clearance: 33.5 mL/min (by C-G formula based on SCr of 0.98 mg/dL).  No results found for: BNP  No results found for: PHART    Microbiology Results Abnormal     Procedure Component Value - Date/Time    Blood Culture - Blood, [[id number]]  (Normal) Collected:  06/16/18 1142    Lab Status:  Preliminary result Specimen:  Blood from Arm, Left Updated:  06/17/18 1215     Blood Culture No growth at 24 hours    Blood Culture - Blood, [[id number]]  (Normal) Collected:  06/16/18 1142    Lab Status:  Preliminary result Specimen:  Blood from Arm, Left Updated:  06/17/18 1215     Blood Culture No growth at 24 hours          Imaging Results (last 24 hours)     ** No results found for the last 24 hours. **             I have reviewed the medications.    Assessment/Plan   Assessment / Plan     Hospital Problem List     * (Principal)Sepsis due to Streptococcus pneumoniae    Dementia    Atelectasis of both lungs    Anemia due to vitamin B12 deficiency    CKD (chronic kidney disease) stage 3, GFR 30-59 ml/min    Sepsis due to pneumococcus             Brief Hospital Course to date:  Arabella Gomes is a 89 y.o. female with PMH of dementia who resides in NH presents with confusion and fever and upper respiratory complaints. Pt found to be febrile on admission.  CXR and infectious workup thus far unremarkable.       Plan:  --continue Zosyn, stop Vanc as has been afebrile x 24 hours. Likely switch to PO ABX in am, then d/c home 2 days  --H&H dropped but suspect this is dilutional as all cell lines appear down since admission. Vitamin B12 wnl, iron studies c/w AOCD. Transfuse PRN HgB<7. Hemoglobin closer to baseline this am. Continue to monitor        DVT Prophylaxis:  mechanical    CODE STATUS: Conditional Code    Disposition: I expect the patient to be discharged back to NH in 2 days      Electronically signed by Fabi Steiner MD, 06/18/18, 12:03 PM.

## 2018-06-18 NOTE — PLAN OF CARE
Problem: Patient Care Overview  Goal: Plan of Care Review  Outcome: Ongoing (interventions implemented as appropriate)   06/18/18 1533   Coping/Psychosocial   Plan of Care Reviewed With patient   Plan of Care Review   Progress no change   OTHER   Outcome Summary VSS, more alert today, possible D/C to Memory Care 6/20       Problem: Fall Risk (Adult)  Goal: Absence of Fall  Outcome: Ongoing (interventions implemented as appropriate)   06/18/18 1533   Fall Risk (Adult)   Absence of Fall making progress toward outcome

## 2018-06-18 NOTE — PLAN OF CARE
Problem: Patient Care Overview  Goal: Plan of Care Review  Outcome: Ongoing (interventions implemented as appropriate)   06/18/18 0243   Coping/Psychosocial   Plan of Care Reviewed With family   Plan of Care Review   Progress no change

## 2018-06-18 NOTE — NURSING NOTE
"Patient has been confused and trying to get out of bed since report received. Have sat with pat 1:1. As of now heart rate is varying from 112-157, ST/SVT. Pt does not show any signs or symptoms with rate. Connie MOORE notified of patient condition, will review chart and decide then what to do.     Connie MOORE on floor, saw patient, she is still coming out of bed, confused with -150's. Discussed code status of patient and need to call brother, which is POA.     Have tried to call Toby (RADHA), several times and no response.     2115) Erich at bedside, updated on patient condition and need to speak with POA, she states \"that would be my dad and he is on his way here now.\" As speaking to nikhloe, Toby phoned, spoke to him and updated on patient's condition. Spoke about recent lab of hbg 7.3 and possibly need of blood.     Toby here at bedside, updated again. Agrees for the patient to have blood products if needed and they would spend the night if needed.     New orders was received for NS Bolus earlier, and done. Labs drawn and result was hbg 8.4, will not transfuse for now. Will get sitter if needed. Family very supportive at bedside. Connie MOORE spoke to Toby, code status addresses and updated.     2230: patient sleeping, family planning on going home. , no distress noted. Assured family, would get sitter with 1:1, if patient woke and tried to keep coming out of the bed.     2315 patient still sleeping, HR 98  "

## 2018-06-19 LAB
ANION GAP SERPL CALCULATED.3IONS-SCNC: 8 MMOL/L (ref 3–11)
B PERT.PT PRMT NPH QL NAA+NON-PROBE: NOT DETECTED
BUN BLD-MCNC: 9 MG/DL (ref 9–23)
BUN/CREAT SERPL: 8.8 (ref 7–25)
C PNEUM DNA NPH QL NAA+NON-PROBE: NOT DETECTED
CALCIUM SPEC-SCNC: 8.3 MG/DL (ref 8.7–10.4)
CHLORIDE SERPL-SCNC: 112 MMOL/L (ref 99–109)
CO2 SERPL-SCNC: 24 MMOL/L (ref 20–31)
CREAT BLD-MCNC: 1.02 MG/DL (ref 0.6–1.3)
DEPRECATED RDW RBC AUTO: 65.3 FL (ref 37–54)
ERYTHROCYTE [DISTWIDTH] IN BLOOD BY AUTOMATED COUNT: 16.7 % (ref 11.3–14.5)
FLUAV H1 RNA NPH QL NAA+NON-PROBE: NOT DETECTED
FLUAV H3 RNA NPH QL NAA+NON-PROBE: NOT DETECTED
FLUAV RNA SPEC QL NAA+PROBE: NOT DETECTED
FLUBV RNA SPEC QL NAA+PROBE: NOT DETECTED
GFR SERPL CREATININE-BSD FRML MDRD: 51 ML/MIN/1.73
GLUCOSE BLD-MCNC: 89 MG/DL (ref 70–100)
HADV DNA SPEC QL NAA+PROBE: NOT DETECTED
HCOV 229E RNA NPH QL NAA+NON-PROBE: NOT DETECTED
HCOV HKU1 RNA NPH QL NAA+NON-PROBE: NOT DETECTED
HCOV NL63 RNA NPH QL NAA+NON-PROBE: NOT DETECTED
HCOV OC43 RNA NPH QL NAA+NON-PROBE: NOT DETECTED
HCT VFR BLD AUTO: 26.5 % (ref 34.5–44)
HGB BLD-MCNC: 8.6 G/DL (ref 11.5–15.5)
HMPV RNA SPEC QL NAA+PROBE: NOT DETECTED
HPIV1 RNA SPEC QL NAA+PROBE: NOT DETECTED
HPIV2 SPEC QL CULT: NOT DETECTED
HPIV3 SPEC QL CULT: NOT DETECTED
HPIV4 RNA NPH QL NAA+NON-PROBE: NOT DETECTED
INR PPP: NOT DETECTED
M PNEUMO DNA SPEC QL NAA+PROBE: NOT DETECTED
MAGNESIUM SERPL-MCNC: 2.1 MG/DL (ref 1.3–2.7)
MCH RBC QN AUTO: 35.1 PG (ref 27–31)
MCHC RBC AUTO-ENTMCNC: 32.5 G/DL (ref 32–36)
MCV RBC AUTO: 108.2 FL (ref 80–99)
PLATELET # BLD AUTO: 107 10*3/MM3 (ref 150–450)
PMV BLD AUTO: 12.3 FL (ref 6–12)
POTASSIUM BLD-SCNC: 3.4 MMOL/L (ref 3.5–5.5)
RBC # BLD AUTO: 2.45 10*6/MM3 (ref 3.89–5.14)
RHINOVIRUS RNA SPEC QL NAA+PROBE: DETECTED
RSV AG SPEC QL: NOT DETECTED
SODIUM BLD-SCNC: 144 MMOL/L (ref 132–146)
WBC NRBC COR # BLD: 3 10*3/MM3 (ref 3.5–10.8)

## 2018-06-19 PROCEDURE — 99232 SBSQ HOSP IP/OBS MODERATE 35: CPT | Performed by: INTERNAL MEDICINE

## 2018-06-19 PROCEDURE — 85027 COMPLETE CBC AUTOMATED: CPT | Performed by: INTERNAL MEDICINE

## 2018-06-19 PROCEDURE — 93010 ELECTROCARDIOGRAM REPORT: CPT | Performed by: INTERNAL MEDICINE

## 2018-06-19 PROCEDURE — 93005 ELECTROCARDIOGRAM TRACING: CPT | Performed by: INTERNAL MEDICINE

## 2018-06-19 PROCEDURE — 80048 BASIC METABOLIC PNL TOTAL CA: CPT | Performed by: INTERNAL MEDICINE

## 2018-06-19 PROCEDURE — 83735 ASSAY OF MAGNESIUM: CPT | Performed by: NURSE PRACTITIONER

## 2018-06-19 PROCEDURE — 25010000002 HEPARIN (PORCINE) PER 1000 UNITS: Performed by: FAMILY MEDICINE

## 2018-06-19 PROCEDURE — 94640 AIRWAY INHALATION TREATMENT: CPT

## 2018-06-19 RX ORDER — IPRATROPIUM BROMIDE AND ALBUTEROL SULFATE 2.5; .5 MG/3ML; MG/3ML
3 SOLUTION RESPIRATORY (INHALATION) EVERY 4 HOURS PRN
Status: DISCONTINUED | OUTPATIENT
Start: 2018-06-19 | End: 2018-06-20 | Stop reason: HOSPADM

## 2018-06-19 RX ORDER — QUETIAPINE FUMARATE 25 MG/1
25 TABLET, FILM COATED ORAL NIGHTLY
Status: DISCONTINUED | OUTPATIENT
Start: 2018-06-19 | End: 2018-06-20 | Stop reason: HOSPADM

## 2018-06-19 RX ORDER — POTASSIUM CHLORIDE 750 MG/1
40 CAPSULE, EXTENDED RELEASE ORAL AS NEEDED
Status: DISCONTINUED | OUTPATIENT
Start: 2018-06-19 | End: 2018-06-20 | Stop reason: HOSPADM

## 2018-06-19 RX ORDER — DOXYCYCLINE 100 MG/1
100 CAPSULE ORAL EVERY 12 HOURS SCHEDULED
Status: DISCONTINUED | OUTPATIENT
Start: 2018-06-19 | End: 2018-06-20 | Stop reason: HOSPADM

## 2018-06-19 RX ORDER — POTASSIUM CHLORIDE 7.45 MG/ML
10 INJECTION INTRAVENOUS
Status: DISCONTINUED | OUTPATIENT
Start: 2018-06-19 | End: 2018-06-20 | Stop reason: HOSPADM

## 2018-06-19 RX ORDER — POTASSIUM CHLORIDE 1.5 G/1.77G
40 POWDER, FOR SOLUTION ORAL AS NEEDED
Status: DISCONTINUED | OUTPATIENT
Start: 2018-06-19 | End: 2018-06-20 | Stop reason: HOSPADM

## 2018-06-19 RX ADMIN — POTASSIUM CHLORIDE 40 MEQ: 1.5 POWDER, FOR SOLUTION ORAL at 09:49

## 2018-06-19 RX ADMIN — HEPARIN SODIUM 5000 UNITS: 5000 INJECTION, SOLUTION INTRAVENOUS; SUBCUTANEOUS at 09:49

## 2018-06-19 RX ADMIN — MEMANTINE 10 MG: 10 TABLET ORAL at 21:42

## 2018-06-19 RX ADMIN — POTASSIUM CHLORIDE 40 MEQ: 750 CAPSULE, EXTENDED RELEASE ORAL at 14:06

## 2018-06-19 RX ADMIN — IPRATROPIUM BROMIDE AND ALBUTEROL SULFATE 3 ML: 2.5; .5 SOLUTION RESPIRATORY (INHALATION) at 12:10

## 2018-06-19 RX ADMIN — DOXYCYCLINE 100 MG: 100 CAPSULE ORAL at 09:50

## 2018-06-19 RX ADMIN — DONEPEZIL HYDROCHLORIDE 5 MG: 10 TABLET, FILM COATED ORAL at 21:43

## 2018-06-19 RX ADMIN — QUETIAPINE FUMARATE 25 MG: 25 TABLET ORAL at 21:42

## 2018-06-19 RX ADMIN — FAMOTIDINE 20 MG: 20 TABLET ORAL at 09:49

## 2018-06-19 RX ADMIN — MIRTAZAPINE 15 MG: 15 TABLET, FILM COATED ORAL at 21:43

## 2018-06-19 RX ADMIN — DOXYCYCLINE 100 MG: 100 CAPSULE ORAL at 21:42

## 2018-06-19 RX ADMIN — OXYBUTYNIN CHLORIDE 5 MG: 5 TABLET ORAL at 09:50

## 2018-06-19 RX ADMIN — MEMANTINE 10 MG: 10 TABLET ORAL at 09:49

## 2018-06-19 RX ADMIN — HEPARIN SODIUM 5000 UNITS: 5000 INJECTION, SOLUTION INTRAVENOUS; SUBCUTANEOUS at 21:43

## 2018-06-19 NOTE — PROGRESS NOTES
[facility] Medicine Services  PROGRESS NOTE    Patient Name: Arabella Gomes  : 1927  MRN: [id number]    Date of Admission: 2018  Length of Stay: 3  Primary Care Physician: No Known Provider    Subjective   Subjective     CC:  F/u sepsis    HPI:  Pt sitting up in chair this am. Reportedly had SVT last pm, EKG was checked and actually shows sinus rhythm with left bundle branch block c/w  EKG.  Of note, EKG from  showed wide complex tachycardia. Pt denies any CP or palpitations.     Review of Systems  Unable to obtain due to mental status/dementia    Otherwise ROS is negative except as mentioned in the HPI.    Objective   Objective     Vital Signs:   Temp:  [98.2 °F (36.8 °C)-98.9 °F (37.2 °C)] 98.9 °F (37.2 °C)  Heart Rate:  [] 77  Resp:  [18-22] 20  BP: (138-147)/() 145/113        Physical Exam:  Constitutional: No acute distress, awake alert  HENT: NCAT, mucous membranes moist  Respiratory: Clear to auscultation bilaterally, respiratory effort normal   Cardiovascular: tachycardic, appears sinus on tele, no murmurs, rubs, or gallops, palpable pedal pulses bilaterally  Gastrointestinal: Positive bowel sounds, soft, nontender, nondistended  Musculoskeletal: No bilateral ankle edema  Psychiatric: normal affect  Neurologic: no focal deficits, alert, oriented to person, not place nor time, GRISSOM  Skin: No rashes  Results Reviewed:  I have personally reviewed current lab, radiology, and data and agree.      Results from last 7 days  Lab Units 18  0355 18  0406 18  2054 18  0539   WBC 10*3/mm3 3.00* 3.16*  --  3.92   HEMOGLOBIN g/dL 8.6* 7.9* 8.4* 7.3*   HEMATOCRIT % 26.5* 24.0* 25.9* 22.1*   PLATELETS 10*3/mm3 107* 101*  --  111*       Results from last 7 days  Lab Units 18  0355 18  0406 18  0536 18  1134   SODIUM mmol/L 144 143 140 139   POTASSIUM mmol/L 3.4* 3.6 3.8 4.4   CHLORIDE mmol/L 112* 114* 108 107   CO2  mmol/L 24.0 24.0 25.0 26.0   BUN mg/dL 9 12 17 21   CREATININE mg/dL 1.02 0.98 1.21 1.07   GLUCOSE mg/dL 89 93 92 116*   CALCIUM mg/dL 8.3* 7.3* 7.6* 8.9   ALT (SGPT) U/L  --   --   --  14   AST (SGOT) U/L  --   --   --  22     Estimated Creatinine Clearance: 33.4 mL/min (by C-G formula based on SCr of 1.02 mg/dL).  No results found for: BNP  No results found for: PHART    Microbiology Results Abnormal     Procedure Component Value - Date/Time    Blood Culture - Blood, [[id number]]  (Normal) Collected:  06/16/18 1142    Lab Status:  Preliminary result Specimen:  Blood from Arm, Left Updated:  06/19/18 1215     Blood Culture No growth at 3 days    Blood Culture - Blood, [[id number]]  (Normal) Collected:  06/16/18 1142    Lab Status:  Preliminary result Specimen:  Blood from Arm, Left Updated:  06/19/18 1215     Blood Culture No growth at 3 days          Imaging Results (last 24 hours)     ** No results found for the last 24 hours. **             I have reviewed the medications.    Assessment/Plan   Assessment / Plan     Hospital Problem List     * (Principal)Sepsis due to Streptococcus pneumoniae    Dementia    Atelectasis of both lungs    Anemia due to vitamin B12 deficiency    CKD (chronic kidney disease) stage 3, GFR 30-59 ml/min    Sepsis due to pneumococcus             Brief Hospital Course to date:  Arabella Gomes is a 89 y.o. female with PMH of dementia who resides in NH presents with confusion and fever and upper respiratory complaints. Pt found to be febrile on admission.  CXR and infectious workup thus far unremarkable.       Plan:  --transition to PO ABX, stopped Vanc and stopped Zosyn this am as has been afebrile x 48 hours.   --H&H dropped but suspect this is dilutional as all cell lines appear down since admission. Vitamin B12 wnl, iron studies c/w AOCD. Transfuse PRN HgB<7. Hemoglobin closer to baseline this am. Continue to monitor  --reviewed EKG from overnight, appears to be in sinus rhythm now with  LBBB (c/w previous).  Monitor. Replace electrolytes as indicated        DVT Prophylaxis:  mechanical    CODE STATUS:   Code Status and Medical Interventions:   Ordered at: 06/19/18 0803     Level Of Support Discussed With:    Patient     Code Status:    No CPR     Medical Interventions (Level of Support Prior to Arrest):    Full       Disposition: I expect the patient to be discharged back to NH in 1 day      Electronically signed by Fabi Steiner MD, 06/19/18, 12:44 PM.

## 2018-06-19 NOTE — PLAN OF CARE
Problem: Patient Care Overview  Goal: Plan of Care Review  Outcome: Ongoing (interventions implemented as appropriate)   06/19/18 0401   OTHER   Outcome Summary vss, pt severly confused . Dementia noted     Goal: Discharge Needs Assessment  Outcome: Ongoing (interventions implemented as appropriate)    Goal: Interprofessional Rounds/Family Conf  Outcome: Ongoing (interventions implemented as appropriate)      Problem: Fall Risk (Adult)  Goal: Identify Related Risk Factors and Signs and Symptoms  Outcome: Ongoing (interventions implemented as appropriate)    Goal: Absence of Fall  Outcome: Ongoing (interventions implemented as appropriate)      Problem: Skin Injury Risk (Adult)  Goal: Identify Related Risk Factors and Signs and Symptoms  Outcome: Ongoing (interventions implemented as appropriate)    Goal: Skin Health and Integrity  Outcome: Ongoing (interventions implemented as appropriate)

## 2018-06-19 NOTE — PROGRESS NOTES
Discharge Planning Assessment  [facility]     Patient Name: Arabella Gomes  MRN: [id number]  Today's Date: 6/19/2018    Admit Date: 6/16/2018          Discharge Needs Assessment     Row Name 06/19/18 1749       Living Environment    Lives With facility resident    Current Living Arrangements extended care facility    Primary Care Provided by --   facility care    Provides Primary Care For no one    Family Caregiver if Needed other (see comments)   Brother Toby Gomes is POA    Quality of Family Relationships supportive;involved    Able to Return to Prior Arrangements other (see comments)   other arrangements are made       Resource/Environmental Concerns    Resource/Environmental Concerns none    Transportation Concerns car, none       Transition Planning    Patient/Family Anticipates Transition to long term care facility    Patient/Family Anticipated Services at Transition none    Transportation Anticipated family or friend will provide       Discharge Needs Assessment    Readmission Within the Last 30 Days no previous admission in last 30 days    Concerns to be Addressed discharge planning    Equipment Currently Used at Home walker, rolling    Anticipated Changes Related to Illness inability to care for self    Equipment Needed After Discharge none    Discharge Facility/Level of Care Needs --   personal care facility            Discharge Plan     Row Name 06/19/18 6529       Plan    Plan To personal care facility    Patient/Family in Agreement with Plan yes    Plan Comments Patient's niece called to update me on her discharge plans. She was in personal care at [facility]. Family now has plans for personal care at [facility] and bed available tomorrow. I spoke with Ellen from facility. She will need transfer summary, hand written prescriptions. Tele [phone], fax [phone]    Plan: discharge tomorrow if medically ready with brother to transport.         Destination     No service  coordination in this encounter.      Durable Medical Equipment     No service coordination in this encounter.      Dialysis/Infusion     No service coordination in this encounter.      Home Medical Care     No service coordination in this encounter.      Social Care     No service coordination in this encounter.        Expected Discharge Date and Time     Expected Discharge Date Expected Discharge Time    Jun 20, 2018               Demographic Summary     Row Name 06/19/18 1643       General Information    Admission Type inpatient    Referral Source admission list    Preferred Language English       Contact Information    Permission Granted to Share Info With     Contact Information Obtained for     Contact Information Comments Elizabeth Toozacarias [phone]            Functional Status     Row Name 06/19/18 1645       Functional Status    Usual Activity Tolerance other (see comments)   unable to assess       Functional Status, IADL    Medications completely dependent    Meal Preparation completely dependent    Housekeeping completely dependent    Laundry completely dependent    Shopping completely dependent       Employment/    Employment/ Comments Has [employer] Medicare replacement            Psychosocial    No documentation.           Abuse/Neglect    No documentation.           Legal    No documentation.           Substance Abuse    No documentation.           Patient Forms    No documentation.         Hilda Monge RN

## 2018-06-20 VITALS
SYSTOLIC BLOOD PRESSURE: 127 MMHG | HEART RATE: 102 BPM | WEIGHT: 129.8 LBS | RESPIRATION RATE: 16 BRPM | BODY MASS INDEX: 22.16 KG/M2 | OXYGEN SATURATION: 93 % | TEMPERATURE: 98.1 F | HEIGHT: 64 IN | DIASTOLIC BLOOD PRESSURE: 87 MMHG

## 2018-06-20 PROBLEM — A40.3 SEPSIS DUE TO PNEUMOCOCCUS (HCC): Status: RESOLVED | Noted: 2018-06-16 | Resolved: 2018-06-20

## 2018-06-20 PROBLEM — A40.3 SEPSIS DUE TO STREPTOCOCCUS PNEUMONIAE (HCC): Status: RESOLVED | Noted: 2018-06-16 | Resolved: 2018-06-20

## 2018-06-20 LAB
BACTERIA FLD CULT: NORMAL
L PNEUMO1 AG UR QL IA: NEGATIVE
Lab: NORMAL
ORGANISM ID: NORMAL
S PNEUM AG SPEC QL LA: NEGATIVE
SPECIMEN SOURCE: NORMAL

## 2018-06-20 PROCEDURE — 97110 THERAPEUTIC EXERCISES: CPT

## 2018-06-20 PROCEDURE — 99239 HOSP IP/OBS DSCHRG MGMT >30: CPT | Performed by: NURSE PRACTITIONER

## 2018-06-20 PROCEDURE — 25010000002 HEPARIN (PORCINE) PER 1000 UNITS: Performed by: FAMILY MEDICINE

## 2018-06-20 PROCEDURE — 97162 PT EVAL MOD COMPLEX 30 MIN: CPT

## 2018-06-20 RX ORDER — DOXYCYCLINE 100 MG/1
100 CAPSULE ORAL EVERY 12 HOURS SCHEDULED
Qty: 12 CAPSULE | Refills: 0 | Status: SHIPPED | OUTPATIENT
Start: 2018-06-20 | End: 2018-06-20

## 2018-06-20 RX ORDER — OXYBUTYNIN CHLORIDE 5 MG/1
5 TABLET ORAL DAILY
Qty: 30 TABLET | Refills: 0 | Status: SHIPPED | OUTPATIENT
Start: 2018-06-20

## 2018-06-20 RX ORDER — ACETAMINOPHEN 325 MG/1
650 TABLET ORAL EVERY 4 HOURS PRN
Start: 2018-06-20 | End: 2018-06-20

## 2018-06-20 RX ORDER — DOXYCYCLINE 100 MG/1
100 CAPSULE ORAL EVERY 12 HOURS SCHEDULED
Qty: 12 CAPSULE | Refills: 0 | Status: SHIPPED | OUTPATIENT
Start: 2018-06-20 | End: 2018-06-26

## 2018-06-20 RX ORDER — BISACODYL 10 MG
10 SUPPOSITORY, RECTAL RECTAL DAILY
Qty: 30 SUPPOSITORY | Refills: 0 | Status: SHIPPED | OUTPATIENT
Start: 2018-06-20 | End: 2018-07-10

## 2018-06-20 RX ORDER — QUETIAPINE FUMARATE 25 MG/1
25 TABLET, FILM COATED ORAL NIGHTLY
Qty: 30 TABLET | Refills: 0 | Status: SHIPPED | OUTPATIENT
Start: 2018-06-20 | End: 2018-06-20

## 2018-06-20 RX ORDER — DONEPEZIL HYDROCHLORIDE 5 MG/1
5 TABLET, FILM COATED ORAL NIGHTLY
Qty: 30 TABLET | Refills: 0 | Status: SHIPPED | OUTPATIENT
Start: 2018-06-20

## 2018-06-20 RX ORDER — MIRTAZAPINE 15 MG/1
15 TABLET, FILM COATED ORAL NIGHTLY
Qty: 9 TABLET | Refills: 0 | Status: SHIPPED | OUTPATIENT
Start: 2018-06-20 | End: 2018-07-18 | Stop reason: HOSPADM

## 2018-06-20 RX ORDER — MEMANTINE HYDROCHLORIDE 28 MG/1
28 CAPSULE, EXTENDED RELEASE ORAL DAILY
Qty: 30 CAPSULE | Refills: 0 | Status: SHIPPED | OUTPATIENT
Start: 2018-06-20

## 2018-06-20 RX ORDER — DOCUSATE SODIUM 100 MG/1
100 CAPSULE, LIQUID FILLED ORAL 2 TIMES DAILY PRN
Qty: 60 CAPSULE | Refills: 0 | Status: SHIPPED | OUTPATIENT
Start: 2018-06-20 | End: 2018-07-10

## 2018-06-20 RX ORDER — ALBUTEROL SULFATE 2.5 MG/3ML
2.5 SOLUTION RESPIRATORY (INHALATION) EVERY 4 HOURS PRN
Qty: 160 VIAL | Refills: 0 | Status: SHIPPED | OUTPATIENT
Start: 2018-06-20 | End: 2018-07-10

## 2018-06-20 RX ORDER — QUETIAPINE FUMARATE 25 MG/1
25 TABLET, FILM COATED ORAL NIGHTLY
Qty: 30 TABLET | Refills: 0 | Status: SHIPPED | OUTPATIENT
Start: 2018-06-20 | End: 2018-07-18 | Stop reason: HOSPADM

## 2018-06-20 RX ORDER — POLYETHYLENE GLYCOL 3350 17 G/17G
17 POWDER, FOR SOLUTION ORAL DAILY
Qty: 30 EACH | Refills: 0 | Status: SHIPPED | OUTPATIENT
Start: 2018-06-20 | End: 2018-07-18 | Stop reason: HOSPADM

## 2018-06-20 RX ORDER — ACETAMINOPHEN 325 MG/1
650 TABLET ORAL EVERY 4 HOURS PRN
Qty: 30 TABLET | Refills: 0 | Status: SHIPPED | OUTPATIENT
Start: 2018-06-20

## 2018-06-20 RX ADMIN — HEPARIN SODIUM 5000 UNITS: 5000 INJECTION, SOLUTION INTRAVENOUS; SUBCUTANEOUS at 10:07

## 2018-06-20 RX ADMIN — OXYBUTYNIN CHLORIDE 5 MG: 5 TABLET ORAL at 10:07

## 2018-06-20 RX ADMIN — DOCUSATE SODIUM 100 MG: 100 CAPSULE, LIQUID FILLED ORAL at 10:06

## 2018-06-20 RX ADMIN — FAMOTIDINE 20 MG: 20 TABLET ORAL at 10:08

## 2018-06-20 RX ADMIN — DOXYCYCLINE 100 MG: 100 CAPSULE ORAL at 10:07

## 2018-06-20 RX ADMIN — MEMANTINE 10 MG: 10 TABLET ORAL at 10:08

## 2018-06-20 NOTE — PLAN OF CARE
Problem: Patient Care Overview  Goal: Plan of Care Review  Outcome: Ongoing (interventions implemented as appropriate)   06/20/18 1029   Coping/Psychosocial   Plan of Care Reviewed With patient   Plan of Care Review   Progress improving   OTHER   Outcome Summary Presents w/ evolving sympt, incl. atelectl, anemia (decr HGB 8.6), decr.K+, elev Chlor, Sepsis (droplet precaut), & h/o dem.; able to perform ther ex in sup, w/ min to mod A, but limited by runs up to 160 & moaning w/ L hip pain during any mvmt; will be d/c'd to Memory Unit later today; bed mob goal partially met;no other goals met

## 2018-06-20 NOTE — DISCHARGE SUMMARY
[facility] Medicine Services  DISCHARGE SUMMARY    Patient Name: Arabella Gomes  : 1927  MRN: [id number]    Date of Admission: 2018  Date of Discharge:  18  Primary Care Physician: No Known Provider    Consults     No orders found from 2018 to 2018.        Hospital Course     Presenting Problem:   Sepsis due to pneumococcus [A40.3]    Active Hospital Problems (** Indicates Principal Problem)    Diagnosis Date Noted   • Atelectasis of both lungs [J98.11] 2018   • Anemia due to vitamin B12 deficiency [D51.9] 2018   • Dementia [F03.90]    • CKD (chronic kidney disease) stage 3, GFR 30-59 ml/min [N18.3]       Resolved Hospital Problems    Diagnosis Date Noted Date Resolved   • **Sepsis due to Streptococcus pneumoniae [A40.3] 2018   • Sepsis due to pneumococcus [A40.3] 2018          Hospital Course:  Arabella Gomes is a 89 y.o. female  with PMH of dementia who resides in memory care unit presents with confusion and fever and upper respiratory complaints. Pt found to be febrile on admission.  CXR and infectious workup thus far unremarkable. Patient was started on treatment for pneumonia with zosyn and vancomycin. Respiratory culture positive for human rhinovirus. Patient symptoms have improved. She requires no oxygen, remains afebrile. Noted leukopenia/ pancytopenia with underlying chronic anemia/ thrombocytopenia. Zosyn has been stopped and white count improving. Anemia stable/ improving. She will continue doxycycline for remainder of therapy. Recommend repeat cbc in 3 days.    Patient did have episode of tachycardia which appears to be sinus with LBBB. Rate has improved and remains 90s- low 100s. Patient to follow with pcp. Encourage oral hydration. Patient to be transferred to  [facility].           Day of Discharge     HPI:   Patient resting in bed. No complaints. Confused conversation. No family present. No  overnight events    Review of Systems  Gen- No fevers, chills  CV- No chest pain, palpitations  Resp- No cough, dyspnea  GI- No N/V/D, abd pain  Difficult to obtain 2/2 dementia    Otherwise ROS is negative except as mentioned in the HPI.    Vital Signs:   Temp:  [98.1 °F (36.7 °C)-98.5 °F (36.9 °C)] 98.1 °F (36.7 °C)  Heart Rate:  [] 102  Resp:  [16-20] 16  BP: (127-146)/(85-87) 127/87     Physical Exam:  Constitutional: No acute distress, awake, alert  HENT: NCAT, mucous membranes moist  Respiratory: decreased bilaterally, but clear, respiratory effort normal   Cardiovascular: RRR, no murmurs, rubs, or gallops, palpable pedal pulses bilaterally  Gastrointestinal: Positive bowel sounds, soft, nontender, nondistended  Musculoskeletal: No bilateral ankle edema  Psychiatric: Appropriate affect, cooperative  Neurologic: pleasantly confused, strength symmetric in all extremities, Cranial Nerves grossly intact to confrontation, speech clear  Skin: No rashes      Pertinent  and/or Most Recent Results       Results from last 7 days  Lab Units 06/19/18  0355 06/18/18  0406 06/17/18  2054 06/17/18  0539 06/17/18  0536 06/17/18  0010 06/16/18  1554 06/16/18  1134   WBC 10*3/mm3 3.00* 3.16*  --  3.92  --   --   --  8.71   HEMOGLOBIN g/dL 8.6* 7.9* 8.4* 7.3*  --  8.2* 8.0* 9.5*   HEMATOCRIT % 26.5* 24.0* 25.9* 22.1*  --  25.0* 24.1* 28.5*   PLATELETS 10*3/mm3 107* 101*  --  111*  --   --   --  152   SODIUM mmol/L 144 143  --   --  140  --   --  139   POTASSIUM mmol/L 3.4* 3.6  --   --  3.8  --   --  4.4   CHLORIDE mmol/L 112* 114*  --   --  108  --   --  107   CO2 mmol/L 24.0 24.0  --   --  25.0  --   --  26.0   BUN mg/dL 9 12  --   --  17  --   --  21   CREATININE mg/dL 1.02 0.98  --   --  1.21  --   --  1.07   GLUCOSE mg/dL 89 93  --   --  92  --   --  116*   CALCIUM mg/dL 8.3* 7.3*  --   --  7.6*  --   --  8.9       Results from last 7 days  Lab Units 06/16/18  1134   BILIRUBIN mg/dL 1.1   ALK PHOS U/L 64   ALT (SGPT)  U/L 14   AST (SGOT) U/L 22           Invalid input(s): TG, LDLCALC, LDLREALC    Results from last 7 days  Lab Units 06/17/18  0536   TSH mIU/mL 0.782     Brief Urine Lab Results  (Last result in the past 365 days)      Color   Clarity   Blood   Leuk Est   Nitrite   Protein   CREAT   Urine HCG        06/16/18 1123 Yellow Clear Negative Negative Negative Trace(A)               Microbiology Results Abnormal     Procedure Component Value - Date/Time    Respiratory Panel, PCR - Swab, Nasopharynx [[id number]]  (Abnormal) Collected:  06/16/18 1954    Lab Status:  Final result Specimen:  Swab from Nasopharynx Updated:  06/19/18 1520     Adenovirus Detection by PCR Not Detected     Coronavirus HKU1 Not Detected     Coronavirus NL63 Not Detected     Coronavirus 229E Not Detected     Coronavirus OC43 Not Detected     Human Metapneumovirus Not Detected     Human Rhinovirus/Enterovirus Detected (A)     Influenza A PCR Not Detected     Influenza A H1 Not Detected     Influenza 2009 H1N1 by PCR Not Detected     Influenza A H3 Not Detected     Influenza B PCR Not Detected     Parainfluenza Virus 1 Not Detected     Parainfluenza Virus 2 Not Detected     Parainfluenza Virus 3 Not Detected     Parainfluenza Virus 4 Not Detected     Respiratory Syncytial Virus Not Detected     Bordetella pertussis pcr Not Detected     Chlamydophila pneumoniae PCR Not Detected     Mycoplasma pneumo by PCR Not Detected    Narrative:       Performed at:  [address] [place] [address]  [id number]  : Toribio Zarco MD, Phone:  [phone]    Blood Culture - Blood, [[id number]]  (Normal) Collected:  06/16/18 1142    Lab Status:  Preliminary result Specimen:  Blood from Arm, Left Updated:  06/19/18 1215     Blood Culture No growth at 3 days    Blood Culture - Blood, [[id number]]  (Normal) Collected:  06/16/18 1142    Lab Status:  Preliminary result Specimen:  Blood from Arm, Left Updated:  06/19/18 1215     Blood Culture  No growth at 3 days          Imaging Results (all)     Procedure Component Value Units Date/Time    XR Chest 1 View [[id number]] Collected:  06/16/18 1147     Updated:  06/17/18 0916    Narrative:          EXAMINATION: XR CHEST 1 VW - 6/16/2018     INDICATION: Cough, fever.      COMPARISON: None.     FINDINGS: Cardiac size is borderline enlarged with pulmonary vascularity  in normal limits. Asymmetric elevation of the left hemidiaphragm with  bibasilar atelectasis, however, no focal consolidation. No pneumothorax  or pleural effusion.        Impression:       Chronic lung changes including mild asymmetric elevation of  the left hemidiaphragm, however, no focal consolidation to suggest acute  pneumonia.     DICTATED:   6/16/2018  EDITED/ls :   6/16/2018      This report was finalized on 6/17/2018 9:14 AM by Dr. Daniel Bowling.       XR Hips Bilateral With or Without Pelvis 2 View [[id number]] Collected:  06/16/18 1826     Updated:  06/17/18 0916    Narrative:          EXAMINATION: XR BILATERAL HIPS, W OR WO PELVIS, 2 VIEWS - 6/16/2018     INDICATION: A41.9-Sepsis, unspecified organism; R41.82-Altered mental  status, unspecified.      COMPARISON: None.     FINDINGS: Multiple views of the pelvis and hips reveal degenerative  changes of the lumbosacral region with SI joints symmetric. No displaced  pelvic ring fracture with femoral heads well located in the acetabular  cups. No femoral neck fracture is identified with cortices preserved  despite decreased bone mineral density throughout. Calcified  phleboliths.           Impression:       No evidence for acute displaced fracture of the pelvis or  femoral necks. Degenerative changes of the lumbosacral region and  bilateral hips, left greater than right.     DICTATED:   6/16/2018  EDITED/ls :   6/16/2018      This report was finalized on 6/17/2018 9:14 AM by Dr. Daniel Bowling.                             Order Current Status    Legionella Antigen, Urine - Urine, Urine, Clean  Catch In process    S. Pneumo Ag Urine or CSF - Urine, Urine, Clean Catch In process    Blood Culture - Blood, Preliminary result    Blood Culture - Blood, Preliminary result        Discharge Details        Discharge Medications      New Medications      Instructions Start Date   acetaminophen 325 MG tablet  Commonly known as:  TYLENOL   650 mg, Oral, Every 4 Hours PRN      doxycycline 100 MG capsule  Commonly known as:  MONODOX   100 mg, Oral, Every 12 Hours Scheduled      QUEtiapine 25 MG tablet  Commonly known as:  SEROquel   25 mg, Oral, Nightly         Changes to Medications      Instructions Start Date   docusate sodium 100 MG capsule  Commonly known as:  COLACE  What changed:  · when to take this  · reasons to take this   100 mg, Oral, 2 Times Daily PRN         Continue These Medications      Instructions Start Date   albuterol (2.5 MG/3ML) 0.083% nebulizer solution  Commonly known as:  PROVENTIL   2.5 mg, Nebulization, Every 4 Hours PRN      bisacodyl 10 MG suppository  Commonly known as:  DULCOLAX   10 mg, Rectal, Daily      donepezil 5 MG tablet  Commonly known as:  ARICEPT   5 mg, Oral, Nightly      memantine 28 MG capsule sustained-release 24 hr extended release capsule  Commonly known as:  NAMENDA XR   28 mg, Oral, Daily      mirtazapine 15 MG tablet  Commonly known as:  REMERON   15 mg, Oral, Nightly      oxybutynin 5 MG tablet  Commonly known as:  DITROPAN   5 mg, Oral, Daily      polyethylene glycol packet  Commonly known as:  MIRALAX   17 g, Oral, Daily         Stop These Medications    fleet enema 7-19 GM/118ML enema     magnesium hydroxide 400 MG/5ML suspension  Commonly known as:  MILK OF MAGNESIA              Discharge Disposition:  Home or Self Care    Discharge Diet:  Diet Instructions     Diet: Regular; Thin       Discharge Diet:  Regular    Fluid Consistency:  Thin          Discharge Activity:   Activity Instructions     Activity as Tolerated             Special Instructions:      No future  appointments.    Additional Instructions for the Follow-ups that You Need to Schedule     Discharge Follow-up with PCP    As directed      Currently Documented PCP:  No Known Provider  PCP Phone Number:  None    Follow Up Details:  1 week-               Time Spent on Discharge:  40 minutes    Electronically signed by TERESA Leong, 06/20/18, 9:40 AM.

## 2018-06-20 NOTE — PLAN OF CARE
Problem: Patient Care Overview  Goal: Plan of Care Review  Outcome: Ongoing (interventions implemented as appropriate)   06/20/18 0414   Coping/Psychosocial   Plan of Care Reviewed With patient   OTHER   Outcome Summary vss, no complaints at this time       Problem: Fall Risk (Adult)  Goal: Identify Related Risk Factors and Signs and Symptoms  Outcome: Ongoing (interventions implemented as appropriate)      Problem: Skin Injury Risk (Adult)  Goal: Identify Related Risk Factors and Signs and Symptoms  Outcome: Ongoing (interventions implemented as appropriate)    Goal: Skin Health and Integrity  Outcome: Ongoing (interventions implemented as appropriate)

## 2018-06-20 NOTE — PROGRESS NOTES
Case Management Discharge Note    Final Note: Patient is transfering to [facility], [facility] Care, Memory unit today. Tele Ellen [phone], Faxed transfer summary to [phone]. Her brother is providing transportation.     Destination     No service coordination in this encounter.      Durable Medical Equipment     No service coordination in this encounter.      Dialysis/Infusion     No service coordination in this encounter.      Home Medical Care     No service coordination in this encounter.      Social Care     No service coordination in this encounter.             Final Discharge Disposition Code: 70 - other health care institution not elsewhere defined

## 2018-06-20 NOTE — THERAPY DISCHARGE NOTE
Acute Care - Physical Therapy Initial Eval/Discharge  [facility]     Patient Name: Arabella Gomes  : 1927  MRN: [id number]  Today's Date: 2018   Onset of Illness/Injury or Date of Surgery: 18  Date of Referral to PT: 18  Referring Physician: MD Zuly      Admit Date: 2018    Visit Dx:    ICD-10-CM ICD-9-CM   1. Sepsis, due to unspecified organism A41.9 038.9     995.91   2. Altered mental status, unspecified altered mental status type R41.82 780.97   3. Impaired functional mobility, balance, gait, and endurance Z74.09 V49.89     Patient Active Problem List   Diagnosis   • Dementia   • Atelectasis of both lungs   • Anemia due to vitamin B12 deficiency   • CKD (chronic kidney disease) stage 3, GFR 30-59 ml/min     Past Medical History:   Diagnosis Date   • CKD (chronic kidney disease) stage 3, GFR 30-59 ml/min    • Dementia    • LBBB (left bundle branch block)      Past Surgical History:   Procedure Laterality Date   • NO PAST SURGERIES            PT ASSESSMENT (last 12 hours)      Physical Therapy Evaluation     Row Name 18 0825          PT Evaluation Time/Intention    Subjective Information complains of;pain   guarding L hip (moans w/mvmt);APRNassessing;toD/Clater today  -DM     Document Type discharge evaluation/summary  -DM     Mode of Treatment individual therapy;physical therapy  -DM     Patient Effort fair  -DM     Symptoms Noted During/After Treatment increased pain;significant change in vital signs   runs up to160;nsgNotified;[email or web address];  -DM     Comment NSG:beau ortizts; will moan w/any mvmt  -DM     Row Name 18 0825          General Information    Patient Profile Reviewed? yes  -DM     Onset of Illness/Injury or Date of Surgery 18  -DM     Referring Physician MD Zuly  -DM     Patient Observations alert;cooperative;agree to therapy  -DM     General Observations of Patient R sidelying in bed;head rot & lat flexed to R,w/  shortening of R neck MM;tele,iv,RA,waff dayna; dual exit alarms  -DM     Prior Level of Function max assist:;gait;transfer;bed mobility;ADL's   Max A mobil(w/Rwx);LTC staff assists w/ADL's;poorHistor.(Dem  -DM     Equipment Currently Used at Home walker, rolling   poor histor.  -DM     Pertinent History of Current Functional Problem incr. cough/fever;fell @[place] [place];found down by staff, w/incr conf,R hip pain, chills; to [facility] ER; temp >101;decr o2 sat; adm w/ sepsis (pneumococcus), lung atelect, anemia, & h/o Dem; placed in droplet precaut.; plans d/c to Memory Unit   -DM     Existing Precautions/Restrictions fall;other (see comments)   Dem.; dual exit alarms  -DM     Risks Reviewed patient:;increased discomfort;lines disloged  -DM     Benefits Reviewed patient:;improve function;increase independence;increase strength;decrease pain  -DM     Barriers to Rehab medically complex;previous functional deficit;cognitive status  -DM     Row Name 06/20/18 0825          Relationship/Environment    Primary Source of Support/Comfort sibling(s)   Bro. is POA  -DM     Lives With facility resident   the [facility] (LTC)  -DM     Family Caregiver if Needed other (see comments)   LTC staff  -DM     Row Name 06/20/18 0825          Resource/Environmental Concerns    Current Living Arrangements extended care facility  -DM     Resource/Environmental Concerns none  -DM     Transportation Concerns car, none  -DM     Row Name 06/20/18 0825          Cognitive Assessment/Interventions    Additional Documentation Cognitive Assessment/Intervention (Group)  -DM     Row Name 06/20/18 0825          Cognitive Assessment/Intervention- PT/OT    Affect/Mental Status (Cognitive) low arousal/lethargic  -DM     Orientation Status (Cognition) oriented to;person   states name only  -DM     Follows Commands (Cognition) follows one step commands;25-49% accuracy  -DM     Cognitive Function (Cognitive) memory deficit;safety deficit  -DM     Memory Deficit  (Cognitive) moderate deficit  -DM     Safety Deficit (Cognitive) moderate deficit  -DM     Personal Safety Interventions fall prevention program maintained   dual exit alarms  -DM     Row Name 06/20/18 0825          Safety Issues, Functional Mobility    Impairments Affecting Function (Mobility) cognition;endurance/activity tolerance;pain;strength  -DM     Row Name 06/20/18 0825          Bed Mobility Assessment/Treatment    Bed Mobility Assessment/Treatment rolling left;rolling right  -DM     Rolling Left [place] (Bed Mobility) maximum assist (25% patient effort)  -DM     Rolling Right [place] (Bed Mobility) maximum assist (25% patient effort)  -DM     Bed Mobility, Safety Issues cognitive deficits limit understanding;decreased use of arms for pushing/pulling;decreased use of legs for bridging/pushing  -DM     Assistive Device (Bed Mobility) draw sheet  -DM     Row Name 06/20/18 0825          Transfer Assessment/Treatment    Comment (Transfers) def. (runs to 160 w/ mvmt)  -DM     Row Name 06/20/18 0825          Gait/Stairs Assessment/Training    Comment (Gait/Stairs) def. d/t runs to 160  -DM     Row Name 06/20/18 0825          General ROM    GENERAL ROM COMMENTS B shldrs barnett.25%;B hip flex to 90 deg,abd to 40 deg(moans w/ pain/guards);neck PROM barnett.50%(keeps head rot & lat. flexed to R)  -DM     Row Name 06/20/18 0825          General Assessment (Manual Muscle Testing)    General Manual Muscle Testing (MMT) Assessment upper extremity strength deficits identified;lower extremity strength deficits identified  -DM     Comment, General Manual Muscle Testing (MMT) Assessment B shldrs & hips 2-/5;B elbow F/E 3/5;B ankle DF 2-/5;neck 2+/5 in avail range  -DM     Row Name 06/20/18 0825          Motor Assessment/Intervention    Additional Documentation Therapeutic Exercise (Group);Therapeutic Exercise Interventions (Group)  -DM     Row Name 06/20/18 0825          Therapeutic Exercise    01341 - PT Therapeutic  Exercise Minutes 50  -DM     Row Name 06/20/18 0825          Therapeutic Exercise    Upper Extremity Range of Motion (Therapeutic Exercise) shoulder flexion/extension, bilateral;shoulder abduction/adduction, bilateral;shoulder horizontal abduction/adduction, bilateral;shoulder internal/external rotation, bilateral;elbow flexion/extension, bilateral;forearm supination/pronation, bilateral;wrist flexion/extension, bilateral    exer(VConly);mod.asst B shldrs;Claudia elbows/Wrists  -DM     Lower Extremity Range of Motion (Therapeutic Exercise) hip flexion/extension, bilateral;hip abduction/adduction, bilateral;hip internal/external rotation, bilateral;knee flexion/extension, bilateral;ankle dorsiflexion/plantar flexion, bilateral   BKFO,H.slides,HC & HS stretches  -DM     Exercise Type (Therapeutic Exercise) AAROM (active assistive range of motion);AROM (active range of motion)  -DM     Position (Therapeutic Exercise) supine  -DM     Sets/Reps (Therapeutic Exercise) 1/10  -DM     Comment (Therapeutic Exercise) alsoNeck A/AROMall planes;onset runs to 160 w/LE exer  -DM     Row Name 06/20/18 0825          Pain Assessment    Additional Documentation Pain Scale: FACES Pre/Post-Treatment (Group)  -DM     Row Name 06/20/18 0825          Pain Scale: Numbers Pre/Post-Treatment    Pain Location - Side Left  -DM     Pain Location hip  -DM     Pain Intervention(s) Repositioned;Rest  -DM     Row Name 06/20/18 0825          Pain Scale: FACES Pre/Post-Treatment    Pain: FACES Scale, Pretreatment 6-->hurts even more  -DM     Pain: FACES Scale, Post-Treatment 8-->hurts whole lot  -DM     Pre/Post Treatment Pain Comment --   nsg observed  -DM     Row Name 06/20/18 0825          Plan of Care Review    Plan of Care Reviewed With patient  -DM     Row Name 06/20/18 0825          Physical Therapy Clinical Impression    Date of Referral to PT 06/19/18  -DM     PT Diagnosis (PT Clinical Impression) impaired funct mobil  -DM     Criteria for  Skilled Interventions Met (PT Clinical Impression) other (see comments)   eval only  -DM     Pathology/Pathophysiology Noted (Describe Specifically for Each System) pulmonary  -DM     Impairments Found (describe specific impairments) gait, locomotion, and balance;motor function  -DM     Rehab Potential (PT Clinical Summary) good, to achieve stated therapy goals  -DM     Care Plan Review (PT) evaluation/treatment results reviewed   unable to acknowledge  -DM     Row Name 06/20/18 0825          Vital Signs    Pre Systolic BP Rehab 127  -DM     Pre Treatment Diastolic BP 87  -DM     Post Systolic BP Rehab 136  -DM     Post Treatment Diastolic BP 66  -DM     Pretreatment Heart Rate (beats/min) 102   irreg.  -DM     Intratreatment Heart Rate (beats/min) 160  -DM     Posttreatment Heart Rate (beats/min) 68  -DM     Pre SpO2 (%) 95  -DM     O2 Delivery Pre Treatment room air  -DM     Intra SpO2 (%) 93  -DM     O2 Delivery Intra Treatment room air  -DM     Post SpO2 (%) 94  -DM     O2 Delivery Post Treatment room air  -DM     Pre Patient Position Side Lying  -DM     Intra Patient Position Supine  -DM     Post Patient Position Side Lying  -DM     Row Name 06/20/18 0825          Physical Therapy Goals    Bed Mobility Goal Selection (PT) bed mobility, PT goal 1  -DM     Transfer Goal Selection (PT) transfer, PT goal 1  -DM     Gait Training Goal Selection (PT) gait training, PT goal 1  -DM     Row Name 06/20/18 0825          Bed Mobility Goal 1 (PT)    Activity/Assistive Device (Bed Mobility Goal 1, PT) rolling to left;rolling to right;supine to sit  -DM     [place] Level/Cues Needed (Bed Mobility Goal 1, PT) maximum assist (25-49% patient effort)  -DM     Time Frame (Bed Mobility Goal 1, PT) short term goal (STG);1 day  -DM     Progress/Outcomes (Bed Mobility Goal 1, PT) goal partially met;discharged from facility  -DM     Row Name 06/20/18 0825          Transfer Goal 1 (PT)    Activity/Assistive Device (Transfer Goal 1,  PT) sit-to-stand/stand-to-sit;bed-to-chair/chair-to-bed  -DM     [place] Level/Cues Needed (Transfer Goal 1, PT) maximum assist (25-49% patient effort)  -DM     Time Frame (Transfer Goal 1, PT) short term goal (STG);1 day  -DM     Progress/Outcome (Transfer Goal 1, PT) goal not met;discharged from facility  -     Row Name 06/20/18 0825          Gait Training Goal 1 (PT)    Activity/Assistive Device (Gait Training Goal 1, PT) gait (walking locomotion);assistive device use;walker, rolling  -DM     [place] Level (Gait Training Goal 1, PT) maximum assist (25-49% patient effort)  -DM     Distance (Gait Goal 1, PT) 3  -DM     Time Frame (Gait Training Goal 1, PT) short term goal (STG);1 day  -DM     Progress/Outcome (Gait Training Goal 1, PT) goal not met;discharged from [facility]  -     Row Name 06/20/18 0825          Patient Education Goal (PT)    Activity (Patient Education Goal, PT) HEP exer  -DM     [place]/Cues/Accuracy (Memory Goal 2, PT) demonstrates adequately  -DM     Time Frame (Patient Education Goal, PT) short term goal (STG);1 day  -DM     Progress/Outcome (Patient Education Goal, PT) goal not met;discharged from [facility]  -     Row Name 06/20/18 0825          Positioning and Restraints    Pre-Treatment Position in bed  -DM     Post Treatment Position bed  -DM     In Bed notified nsg;side lying right;call light within reach;encouraged to call for assist;exit alarm on;pillow between legs  -DM       User Key  (r) = Recorded By, (t) = Taken By, (c) = Cosigned By    Initials Name Provider Type    DM Courtney Gómez, PT Physical Therapist          Physical Therapy Education     Title: PT OT SLP Therapies (Active)     Topic: Physical Therapy (Active)     Point: Mobility training (Active)    Learning Progress Summary     Learner Status Readiness Method Response Comment Documented by    Patient Active Acceptance E,D NR  DM 06/20/18 3514          Point: Home exercise program (Active)    Learning  Progress Summary     Learner Status Readiness Method Response Comment Documented by    Patient Active Acceptance E,D NR  DM 06/20/18 1028          Point: Body mechanics (Active)    Learning Progress Summary     Learner Status Readiness Method Response Comment Documented by    Patient Active Acceptance E,D NR  DM 06/20/18 1028          Point: Precautions (Active)    Learning Progress Summary     Learner Status Readiness Method Response Comment Documented by    Patient Active Acceptance E,D NR  DM 06/20/18 1028                      User Key     Initials Effective Dates Name Provider Type Discipline     06/19/15 -  Courtney Gómez, PT Physical Therapist PT                PT Recommendation and Plan  Anticipated Discharge Disposition (PT): other (see comments)  Therapy Frequency (PT Clinical Impression): evaluation only  Outcome Summary/Treatment Plan (PT)  Anticipated Discharge Disposition (PT): other (see comments)  Plan of Care Reviewed With: patient  Progress: improving  Outcome Summary: Presents w/ evolving sympt, incl. atelectl, anemia (decr HGB 8.6), decr.K+, elev Chlor, Sepsis (droplet precaut), & h/o dem.; able to perform ther ex in sup, w/ min to mod A, but limited by runs up to 160 & moaning w/ L hip pain during any mvmt; will be d/c'd to Memory Unit  later today; bed mob goal partially met;no other goals met          Outcome Measures     Row Name 06/20/18 0857             How much help from another person do you currently need...    Turning from your back to your side while in flat bed without using bedrails? 2  -DM      Moving from lying on back to sitting on the side of a flat bed without bedrails? 2  -DM      Moving to and from a bed to a chair (including a wheelchair)? 2  -DM      Standing up from a chair using your arms (e.g., wheelchair, bedside chair)? 2  -DM      Climbing 3-5 steps with a railing? 1  -DM      To walk in hospital room? 2  -DM      AM-PAC 6 Clicks Score 11  -DM         Functional  Assessment    Outcome Measure Options AM-PAC 6 Clicks Basic Mobility (PT)  -DM        User Key  (r) = Recorded By, (t) = Taken By, (c) = Cosigned By    Initials Name Provider Type    VAHE Gómez, PT Physical Therapist           Time Calculation:         PT Charges     Row Name 06/20/18 1034 06/20/18 0825          Time Calculation    Start Time 0825  -DM  --     PT Received On 06/20/18  -DM  --     PT Goal Re-Cert Due Date 06/30/18  -DM  --        Time Calculation- PT    Total Timed Code Minutes- PT 50 minute(s)  -DM  --        Timed Charges    67948 - PT Therapeutic Exercise Minutes  -- 50  -DM       User Key  (r) = Recorded By, (t) = Taken By, (c) = Cosigned By    Initials Name Provider Type    VAHE Gómez, PT Physical Therapist        Therapy Suggested Charges     Code   Minutes Charges    02836 (CPT®) Hc Pt Neuromusc Re Education Ea 15 Min      49526 (CPT®) Hc Pt Ther Proc Ea 15 Min 50 3    63847 (CPT®) Hc Gait Training Ea 15 Min      15378 (CPT®) Hc Pt Therapeutic Act Ea 15 Min      88108 (CPT®) Hc Pt Manual Therapy Ea 15 Min      69219 (CPT®) Hc Pt Iontophoresis Ea 15 Min      56809 (CPT®) Hc Pt Elec Stim Ea-Per 15 Min      33001 (CPT®) Hc Pt Ultrasound Ea 15 Min      17036 (CPT®) Hc Pt Self Care/Mgmt/Train Ea 15 Min      Total  50 3        Therapy Charges for Today     Code Description Service Date Service Provider Modifiers Qty    83501560074 HC PT THER PROC EA 15 MIN 6/20/2018 Courtney Gómez, PT GP 3    91409910572 HC PT EVAL MOD COMPLEXITY 4 6/20/2018 Courtney Gómez, PT GP 1          PT G-Codes  Outcome Measure Options: AM-PAC 6 Clicks Basic Mobility (PT)    PT Discharge Summary  Anticipated Discharge Disposition (PT): other (see comments)  Reason for Discharge: Discharge from facility  Outcomes Achieved: Patient able to partially acheive established goals  Discharge Destination: other (comment) (Memory care facility)    Courteny Gómez, PT  6/20/2018

## 2018-06-21 LAB
BACTERIA SPEC AEROBE CULT: NORMAL
BACTERIA SPEC AEROBE CULT: NORMAL

## 2018-07-10 ENCOUNTER — APPOINTMENT (OUTPATIENT)
Dept: CT IMAGING | Facility: HOSPITAL | Age: 83
End: 2018-07-10

## 2018-07-10 ENCOUNTER — HOSPITAL ENCOUNTER (INPATIENT)
Facility: HOSPITAL | Age: 83
LOS: 8 days | Discharge: HOSPICE | End: 2018-07-18
Attending: EMERGENCY MEDICINE | Admitting: INTERNAL MEDICINE

## 2018-07-10 ENCOUNTER — APPOINTMENT (OUTPATIENT)
Dept: GENERAL RADIOLOGY | Facility: HOSPITAL | Age: 83
End: 2018-07-10

## 2018-07-10 DIAGNOSIS — R31.9 URINARY TRACT INFECTION WITH HEMATURIA, SITE UNSPECIFIED: ICD-10-CM

## 2018-07-10 DIAGNOSIS — R13.10 DYSPHAGIA, UNSPECIFIED TYPE: ICD-10-CM

## 2018-07-10 DIAGNOSIS — D64.9 ANEMIA, UNSPECIFIED TYPE: ICD-10-CM

## 2018-07-10 DIAGNOSIS — A41.9 SEPSIS, DUE TO UNSPECIFIED ORGANISM: ICD-10-CM

## 2018-07-10 DIAGNOSIS — Z78.9 IMPAIRED MOBILITY AND ADLS: ICD-10-CM

## 2018-07-10 DIAGNOSIS — R41.82 ALTERED MENTAL STATUS, UNSPECIFIED ALTERED MENTAL STATUS TYPE: Primary | ICD-10-CM

## 2018-07-10 DIAGNOSIS — N28.9 RENAL INSUFFICIENCY: ICD-10-CM

## 2018-07-10 DIAGNOSIS — N39.0 URINARY TRACT INFECTION WITH HEMATURIA, SITE UNSPECIFIED: ICD-10-CM

## 2018-07-10 DIAGNOSIS — Z74.09 IMPAIRED MOBILITY AND ADLS: ICD-10-CM

## 2018-07-10 DIAGNOSIS — Z74.09 IMPAIRED FUNCTIONAL MOBILITY, BALANCE, GAIT, AND ENDURANCE: ICD-10-CM

## 2018-07-10 PROBLEM — R50.9 FEVER: Status: ACTIVE | Noted: 2018-07-10

## 2018-07-10 PROBLEM — N30.01 ACUTE CYSTITIS WITH HEMATURIA: Status: ACTIVE | Noted: 2018-07-10

## 2018-07-10 PROBLEM — J18.9 LEFT LOWER LOBE PNEUMONIA: Status: ACTIVE | Noted: 2018-07-10

## 2018-07-10 PROBLEM — D51.9 ANEMIA DUE TO VITAMIN B12 DEFICIENCY: Status: RESOLVED | Noted: 2018-06-16 | Resolved: 2018-07-10

## 2018-07-10 PROBLEM — J98.11 ATELECTASIS OF BOTH LUNGS: Status: RESOLVED | Noted: 2018-06-16 | Resolved: 2018-07-10

## 2018-07-10 PROBLEM — I95.9 HYPOTENSION: Status: ACTIVE | Noted: 2018-07-10

## 2018-07-10 LAB
ALBUMIN SERPL-MCNC: 3.72 G/DL (ref 3.2–4.8)
ALBUMIN/GLOB SERPL: 1.3 G/DL (ref 1.5–2.5)
ALP SERPL-CCNC: 62 U/L (ref 25–100)
ALT SERPL W P-5'-P-CCNC: 14 U/L (ref 7–40)
ANION GAP SERPL CALCULATED.3IONS-SCNC: 10 MMOL/L (ref 3–11)
AST SERPL-CCNC: 25 U/L (ref 0–33)
BACTERIA UR QL AUTO: ABNORMAL /HPF
BASOPHILS # BLD AUTO: 0.03 10*3/MM3 (ref 0–0.2)
BASOPHILS NFR BLD AUTO: 0.6 % (ref 0–1)
BILIRUB SERPL-MCNC: 1 MG/DL (ref 0.3–1.2)
BILIRUB UR QL STRIP: NEGATIVE
BUN BLD-MCNC: 20 MG/DL (ref 9–23)
BUN/CREAT SERPL: 12.7 (ref 7–25)
CALCIUM SPEC-SCNC: 8.6 MG/DL (ref 8.7–10.4)
CHLORIDE SERPL-SCNC: 105 MMOL/L (ref 99–109)
CLARITY UR: ABNORMAL
CO2 SERPL-SCNC: 26 MMOL/L (ref 20–31)
COLOR UR: YELLOW
CREAT BLD-MCNC: 1.58 MG/DL (ref 0.6–1.3)
D-LACTATE SERPL-SCNC: 1.6 MMOL/L (ref 0.5–2)
DEPRECATED RDW RBC AUTO: 70.3 FL (ref 37–54)
EOSINOPHIL # BLD AUTO: 0 10*3/MM3 (ref 0–0.3)
EOSINOPHIL NFR BLD AUTO: 0 % (ref 0–3)
ERYTHROCYTE [DISTWIDTH] IN BLOOD BY AUTOMATED COUNT: 17.8 % (ref 11.3–14.5)
FLUAV AG NPH QL: NEGATIVE
FLUBV AG NPH QL IA: NEGATIVE
GFR SERPL CREATININE-BSD FRML MDRD: 31 ML/MIN/1.73
GLOBULIN UR ELPH-MCNC: 2.8 GM/DL
GLUCOSE BLD-MCNC: 101 MG/DL (ref 70–100)
GLUCOSE UR STRIP-MCNC: NEGATIVE MG/DL
HCT VFR BLD AUTO: 27.5 % (ref 34.5–44)
HGB BLD-MCNC: 9 G/DL (ref 11.5–15.5)
HGB UR QL STRIP.AUTO: ABNORMAL
HYALINE CASTS UR QL AUTO: ABNORMAL /LPF
IMM GRANULOCYTES # BLD: 0.02 10*3/MM3 (ref 0–0.03)
IMM GRANULOCYTES NFR BLD: 0.4 % (ref 0–0.6)
INR PPP: 1.07 (ref 0.91–1.09)
KETONES UR QL STRIP: ABNORMAL
LEUKOCYTE ESTERASE UR QL STRIP.AUTO: ABNORMAL
LYMPHOCYTES # BLD AUTO: 0.41 10*3/MM3 (ref 0.6–4.8)
LYMPHOCYTES NFR BLD AUTO: 7.5 % (ref 24–44)
MAGNESIUM SERPL-MCNC: 2.1 MG/DL (ref 1.3–2.7)
MCH RBC QN AUTO: 35.4 PG (ref 27–31)
MCHC RBC AUTO-ENTMCNC: 32.7 G/DL (ref 32–36)
MCV RBC AUTO: 108.3 FL (ref 80–99)
MONOCYTES # BLD AUTO: 0.5 10*3/MM3 (ref 0–1)
MONOCYTES NFR BLD AUTO: 9.2 % (ref 0–12)
NEUTROPHILS # BLD AUTO: 4.51 10*3/MM3 (ref 1.5–8.3)
NEUTROPHILS NFR BLD AUTO: 82.7 % (ref 41–71)
NITRITE UR QL STRIP: NEGATIVE
PH UR STRIP.AUTO: <=5 [PH] (ref 5–8)
PLATELET # BLD AUTO: 146 10*3/MM3 (ref 150–450)
PMV BLD AUTO: 12 FL (ref 6–12)
POTASSIUM BLD-SCNC: 4.7 MMOL/L (ref 3.5–5.5)
PROCALCITONIN SERPL-MCNC: 0.3 NG/ML
PROT SERPL-MCNC: 6.5 G/DL (ref 5.7–8.2)
PROT UR QL STRIP: ABNORMAL
PROTHROMBIN TIME: 11.2 SECONDS (ref 9.6–11.5)
RBC # BLD AUTO: 2.54 10*6/MM3 (ref 3.89–5.14)
RBC # UR: ABNORMAL /HPF
REF LAB TEST METHOD: ABNORMAL
SODIUM BLD-SCNC: 141 MMOL/L (ref 132–146)
SP GR UR STRIP: 1.02 (ref 1–1.03)
SQUAMOUS #/AREA URNS HPF: ABNORMAL /HPF
T4 FREE SERPL-MCNC: 1.24 NG/DL (ref 0.89–1.76)
TROPONIN I SERPL-MCNC: 0.04 NG/ML (ref 0–0.07)
TSH SERPL DL<=0.05 MIU/L-ACNC: 0.16 MIU/ML (ref 0.35–5.35)
UROBILINOGEN UR QL STRIP: ABNORMAL
WBC NRBC COR # BLD: 5.45 10*3/MM3 (ref 3.5–10.8)
WBC UR QL AUTO: ABNORMAL /HPF
YEAST URNS QL MICRO: ABNORMAL /HPF

## 2018-07-10 PROCEDURE — 81001 URINALYSIS AUTO W/SCOPE: CPT | Performed by: EMERGENCY MEDICINE

## 2018-07-10 PROCEDURE — 99285 EMERGENCY DEPT VISIT HI MDM: CPT

## 2018-07-10 PROCEDURE — 84439 ASSAY OF FREE THYROXINE: CPT | Performed by: EMERGENCY MEDICINE

## 2018-07-10 PROCEDURE — 83605 ASSAY OF LACTIC ACID: CPT | Performed by: EMERGENCY MEDICINE

## 2018-07-10 PROCEDURE — 84443 ASSAY THYROID STIM HORMONE: CPT | Performed by: EMERGENCY MEDICINE

## 2018-07-10 PROCEDURE — 85610 PROTHROMBIN TIME: CPT | Performed by: EMERGENCY MEDICINE

## 2018-07-10 PROCEDURE — 84145 PROCALCITONIN (PCT): CPT | Performed by: EMERGENCY MEDICINE

## 2018-07-10 PROCEDURE — 25010000002 PIPERACILLIN SOD-TAZOBACTAM PER 1 G: Performed by: EMERGENCY MEDICINE

## 2018-07-10 PROCEDURE — 93005 ELECTROCARDIOGRAM TRACING: CPT | Performed by: EMERGENCY MEDICINE

## 2018-07-10 PROCEDURE — 87086 URINE CULTURE/COLONY COUNT: CPT | Performed by: EMERGENCY MEDICINE

## 2018-07-10 PROCEDURE — 84484 ASSAY OF TROPONIN QUANT: CPT

## 2018-07-10 PROCEDURE — 25010000002 HEPARIN (PORCINE) PER 1000 UNITS: Performed by: PHYSICIAN ASSISTANT

## 2018-07-10 PROCEDURE — 87040 BLOOD CULTURE FOR BACTERIA: CPT | Performed by: EMERGENCY MEDICINE

## 2018-07-10 PROCEDURE — 80053 COMPREHEN METABOLIC PANEL: CPT | Performed by: EMERGENCY MEDICINE

## 2018-07-10 PROCEDURE — 70450 CT HEAD/BRAIN W/O DYE: CPT

## 2018-07-10 PROCEDURE — 99223 1ST HOSP IP/OBS HIGH 75: CPT | Performed by: INTERNAL MEDICINE

## 2018-07-10 PROCEDURE — P9612 CATHETERIZE FOR URINE SPEC: HCPCS

## 2018-07-10 PROCEDURE — 71045 X-RAY EXAM CHEST 1 VIEW: CPT

## 2018-07-10 PROCEDURE — 83735 ASSAY OF MAGNESIUM: CPT | Performed by: EMERGENCY MEDICINE

## 2018-07-10 PROCEDURE — 87804 INFLUENZA ASSAY W/OPTIC: CPT | Performed by: EMERGENCY MEDICINE

## 2018-07-10 PROCEDURE — 85025 COMPLETE CBC W/AUTO DIFF WBC: CPT | Performed by: EMERGENCY MEDICINE

## 2018-07-10 PROCEDURE — 25010000002 VANCOMYCIN: Performed by: EMERGENCY MEDICINE

## 2018-07-10 PROCEDURE — 87106 FUNGI IDENTIFICATION YEAST: CPT | Performed by: EMERGENCY MEDICINE

## 2018-07-10 PROCEDURE — 25010000002 PIPERACILLIN SOD-TAZOBACTAM PER 1 G: Performed by: PHYSICIAN ASSISTANT

## 2018-07-10 RX ORDER — HEPARIN SODIUM 5000 [USP'U]/ML
5000 INJECTION, SOLUTION INTRAVENOUS; SUBCUTANEOUS EVERY 12 HOURS SCHEDULED
Status: DISCONTINUED | OUTPATIENT
Start: 2018-07-10 | End: 2018-07-18 | Stop reason: HOSPADM

## 2018-07-10 RX ORDER — SODIUM CHLORIDE 0.9 % (FLUSH) 0.9 %
1-10 SYRINGE (ML) INJECTION AS NEEDED
Status: DISCONTINUED | OUTPATIENT
Start: 2018-07-10 | End: 2018-07-18 | Stop reason: HOSPADM

## 2018-07-10 RX ORDER — SODIUM CHLORIDE 0.9 % (FLUSH) 0.9 %
10 SYRINGE (ML) INJECTION AS NEEDED
Status: DISCONTINUED | OUTPATIENT
Start: 2018-07-10 | End: 2018-07-18 | Stop reason: HOSPADM

## 2018-07-10 RX ORDER — ACETAMINOPHEN 650 MG/1
650 SUPPOSITORY RECTAL ONCE
Status: COMPLETED | OUTPATIENT
Start: 2018-07-10 | End: 2018-07-10

## 2018-07-10 RX ORDER — DOCUSATE SODIUM 100 MG/1
100 CAPSULE, LIQUID FILLED ORAL 2 TIMES DAILY
Status: DISCONTINUED | OUTPATIENT
Start: 2018-07-10 | End: 2018-07-18 | Stop reason: HOSPADM

## 2018-07-10 RX ORDER — POTASSIUM CHLORIDE 7.45 MG/ML
10 INJECTION INTRAVENOUS
Status: DISCONTINUED | OUTPATIENT
Start: 2018-07-10 | End: 2018-07-18 | Stop reason: HOSPADM

## 2018-07-10 RX ORDER — SODIUM CHLORIDE 9 MG/ML
125 INJECTION, SOLUTION INTRAVENOUS CONTINUOUS
Status: DISCONTINUED | OUTPATIENT
Start: 2018-07-10 | End: 2018-07-11

## 2018-07-10 RX ORDER — POTASSIUM CHLORIDE 1.5 G/1.77G
40 POWDER, FOR SOLUTION ORAL AS NEEDED
Status: DISCONTINUED | OUTPATIENT
Start: 2018-07-10 | End: 2018-07-18 | Stop reason: HOSPADM

## 2018-07-10 RX ORDER — VANCOMYCIN HYDROCHLORIDE 1 G/200ML
15 INJECTION, SOLUTION INTRAVENOUS EVERY 24 HOURS
Status: DISCONTINUED | OUTPATIENT
Start: 2018-07-11 | End: 2018-07-11

## 2018-07-10 RX ORDER — POTASSIUM CHLORIDE 750 MG/1
40 CAPSULE, EXTENDED RELEASE ORAL AS NEEDED
Status: DISCONTINUED | OUTPATIENT
Start: 2018-07-10 | End: 2018-07-18 | Stop reason: HOSPADM

## 2018-07-10 RX ORDER — ACETAMINOPHEN 325 MG/1
650 TABLET ORAL EVERY 4 HOURS PRN
Status: DISCONTINUED | OUTPATIENT
Start: 2018-07-10 | End: 2018-07-18 | Stop reason: HOSPADM

## 2018-07-10 RX ADMIN — TAZOBACTAM SODIUM AND PIPERACILLIN SODIUM 4.5 G: 500; 4 INJECTION, SOLUTION INTRAVENOUS at 12:57

## 2018-07-10 RX ADMIN — ACETAMINOPHEN 650 MG: 650 SUPPOSITORY RECTAL at 12:29

## 2018-07-10 RX ADMIN — SODIUM CHLORIDE 1000 ML: 9 INJECTION, SOLUTION INTRAVENOUS at 16:15

## 2018-07-10 RX ADMIN — TAZOBACTAM SODIUM AND PIPERACILLIN SODIUM 4.5 G: 500; 4 INJECTION, SOLUTION INTRAVENOUS at 21:22

## 2018-07-10 RX ADMIN — VANCOMYCIN HYDROCHLORIDE 1250 MG: 10 INJECTION, POWDER, LYOPHILIZED, FOR SOLUTION INTRAVENOUS at 14:09

## 2018-07-10 RX ADMIN — HEPARIN SODIUM 5000 UNITS: 5000 INJECTION, SOLUTION INTRAVENOUS; SUBCUTANEOUS at 21:23

## 2018-07-10 RX ADMIN — SODIUM CHLORIDE 125 ML/HR: 9 INJECTION, SOLUTION INTRAVENOUS at 16:59

## 2018-07-10 RX ADMIN — SODIUM CHLORIDE 500 ML: 9 INJECTION, SOLUTION INTRAVENOUS at 12:25

## 2018-07-10 RX ADMIN — SODIUM CHLORIDE 500 ML: 9 INJECTION, SOLUTION INTRAVENOUS at 14:39

## 2018-07-10 NOTE — ED PROVIDER NOTES
Subjective   89-year-old female presents via EMS for evaluation of fever and altered mental status.  The patient is a very limited historian at this time.  She has a history of dementia.  Of note, she was recently admitted to our facility for pneumonia and sepsis and is currently living at assisted living facility.  She was noted by staff today to be febrile and more lethargic than normal, prompting them to call EMS.  The patient was subsequently brought to our facility for further evaluation and treatment.        History provided by:  EMS personnel  History limited by:  Mental status change  Altered Mental Status   Presenting symptoms: lethargy    Severity:  Moderate  Most recent episode:  Today  Episode history:  Unable to specify  Timing:  Constant  Progression:  Unable to specify  Context: dementia and nursing home resident    Associated symptoms: fever and weakness        Review of Systems   Unable to perform ROS: Mental status change   Constitutional: Positive for fever.   Neurological: Positive for weakness.       Past Medical History:   Diagnosis Date   • Anxiety    • CKD (chronic kidney disease) stage 3, GFR 30-59 ml/min    • Dementia    • LBBB (left bundle branch block)        No Known Allergies    Past Surgical History:   Procedure Laterality Date   • NO PAST SURGERIES         Family History   Problem Relation Age of Onset   • Heart failure Mother    • Stroke Father    • Heart disease Neg Hx    • Lung disease Neg Hx        Social History     Social History   • Marital status: Single     Spouse name: N/A   • Number of children: 0   • Years of education: College     Occupational History   •  Retired     Social History Main Topics   • Smoking status: Never Smoker   • Smokeless tobacco: Never Used   • Alcohol use No   • Drug use: No   • Sexual activity: No     Other Topics Concern   • Not on file     Social History Narrative    ** Merged History Encounter **              Objective   Physical  Exam   Constitutional: She appears well-developed and well-nourished. No distress.   Chronically ill-appearing elderly female with decreased level of consciousness   HENT:   Head: Normocephalic and atraumatic.   Mouth/Throat: Oropharynx is clear and moist.   Eyes: Pupils are equal, round, and reactive to light.   Neck: Normal range of motion. Neck supple.   Cardiovascular: Normal rate, regular rhythm and normal heart sounds.  Exam reveals no gallop and no friction rub.    No murmur heard.  Pulmonary/Chest: Breath sounds normal. No respiratory distress. She has no wheezes. She has no rales.   tachypneic with nonlabored breathing   Abdominal: Soft. Bowel sounds are normal. She exhibits no distension and no mass. There is no tenderness. There is no rebound and no guarding.   Musculoskeletal: Normal range of motion.   Neurological:   Somnolent with GCS of12 (E-3, M-6, V-3), moving all fours   Skin: Skin is warm and dry. No rash noted. She is not diaphoretic. No erythema.   Psychiatric:   Unable to evaluate   Nursing note and vitals reviewed.      Critical Care  Performed by: HAILEY REYES  Authorized by: HAILEY REYES     Critical care provider statement:     Critical care time (minutes):  35    Critical care time was exclusive of:  Separately billable procedures and treating other patients and teaching time    Critical care was necessary to treat or prevent imminent or life-threatening deterioration of the following conditions:  Renal failure and sepsis    Critical care was time spent personally by me on the following activities:  Evaluation of patient's response to treatment, examination of patient, obtaining history from patient or surrogate, ordering and performing treatments and interventions, ordering and review of laboratory studies, ordering and review of radiographic studies, re-evaluation of patient's condition, development of treatment plan with patient or surrogate, pulse oximetry, discussions  with consultants and review of old charts               ED Course  ED Course as of Jul 10 2059   Tue Jul 10, 2018   1238 89-year-old female presents via EMS for evaluation of altered mental status.  Of note, the patient was admitted to our facility less than one month ago for pneumonia and sepsis.  She was noted by nursing home staff today to be more somnolent than normal and was also noted to be febrile, prompting their call EMS.  On arrival to the ED, patient febrile.  Tylenol given.  Broad-spectrum antibiotics administered.  Blood and urine cultures pending.  We will obtain labs and imaging and reassess following IV fluids and initial interventions.  [DD]   1404 Labs remarkable for urinary tract infection, mildly elevated pro calcitonin, and renal insufficiency.  Upon reevaluation following initial interventions, patient's vital signs improved.  I discussed her case with Dr. Downs and will admit for further evaluation and treatment.  She is hemodynamically stable at this time.  [DD]      ED Course User Index  [DD] Elvis Orozco MD      Recent Results (from the past 24 hour(s))   POC Troponin, Rapid    Collection Time: 07/10/18 12:22 PM   Result Value Ref Range    Troponin I 0.04 0.00 - 0.07 ng/mL   Comprehensive Metabolic Panel    Collection Time: 07/10/18 12:23 PM   Result Value Ref Range    Glucose 101 (H) 70 - 100 mg/dL    BUN 20 9 - 23 mg/dL    Creatinine 1.58 (H) 0.60 - 1.30 mg/dL    Sodium 141 132 - 146 mmol/L    Potassium 4.7 3.5 - 5.5 mmol/L    Chloride 105 99 - 109 mmol/L    CO2 26.0 20.0 - 31.0 mmol/L    Calcium 8.6 (L) 8.7 - 10.4 mg/dL    Total Protein 6.5 5.7 - 8.2 g/dL    Albumin 3.72 3.20 - 4.80 g/dL    ALT (SGPT) 14 7 - 40 U/L    AST (SGOT) 25 0 - 33 U/L    Alkaline Phosphatase 62 25 - 100 U/L    Total Bilirubin 1.0 0.3 - 1.2 mg/dL    eGFR Non African Amer 31 (L) >60 mL/min/1.73    Globulin 2.8 gm/dL    A/G Ratio 1.3 (L) 1.5 - 2.5 g/dL    BUN/Creatinine Ratio 12.7 7.0 - 25.0    Anion Gap 10.0  3.0 - 11.0 mmol/L   Protime-INR    Collection Time: 07/10/18 12:23 PM   Result Value Ref Range    Protime 11.2 9.6 - 11.5 Seconds    INR 1.07 0.91 - 1.09   Urinalysis With Microscopic If Indicated (No Culture) - Urine, Catheter    Collection Time: 07/10/18 12:23 PM   Result Value Ref Range    Color, UA Yellow Yellow, Straw    Appearance, UA Cloudy (A) Clear    pH, UA <=5.0 5.0 - 8.0    Specific Gravity, UA 1.017 1.001 - 1.030    Glucose, UA Negative Negative    Ketones, UA Trace (A) Negative    Bilirubin, UA Negative Negative    Blood, UA Moderate (2+) (A) Negative    Protein, UA 30 mg/dL (1+) (A) Negative    Leuk Esterase, UA Large (3+) (A) Negative    Nitrite, UA Negative Negative    Urobilinogen, UA 0.2 E.U./dL 0.2 - 1.0 E.U./dL   Lactic Acid, Plasma    Collection Time: 07/10/18 12:23 PM   Result Value Ref Range    Lactate 1.6 0.5 - 2.0 mmol/L   Procalcitonin    Collection Time: 07/10/18 12:23 PM   Result Value Ref Range    Procalcitonin 0.30 (H) <=0.25 ng/mL   Magnesium    Collection Time: 07/10/18 12:23 PM   Result Value Ref Range    Magnesium 2.1 1.3 - 2.7 mg/dL   T4, Free    Collection Time: 07/10/18 12:23 PM   Result Value Ref Range    Free T4 1.24 0.89 - 1.76 ng/dL   TSH    Collection Time: 07/10/18 12:23 PM   Result Value Ref Range    TSH 0.163 (L) 0.350 - 5.350 mIU/mL   CBC Auto Differential    Collection Time: 07/10/18 12:23 PM   Result Value Ref Range    WBC 5.45 3.50 - 10.80 10*3/mm3    RBC 2.54 (L) 3.89 - 5.14 10*6/mm3    Hemoglobin 9.0 (L) 11.5 - 15.5 g/dL    Hematocrit 27.5 (L) 34.5 - 44.0 %    .3 (H) 80.0 - 99.0 fL    MCH 35.4 (H) 27.0 - 31.0 pg    MCHC 32.7 32.0 - 36.0 g/dL    RDW 17.8 (H) 11.3 - 14.5 %    RDW-SD 70.3 (H) 37.0 - 54.0 fl    MPV 12.0 6.0 - 12.0 fL    Platelets 146 (L) 150 - 450 10*3/mm3    Neutrophil % 82.7 (H) 41.0 - 71.0 %    Lymphocyte % 7.5 (L) 24.0 - 44.0 %    Monocyte % 9.2 0.0 - 12.0 %    Eosinophil % 0.0 0.0 - 3.0 %    Basophil % 0.6 0.0 - 1.0 %    Immature Grans %  "0.4 0.0 - 0.6 %    Neutrophils, Absolute 4.51 1.50 - 8.30 10*3/mm3    Lymphocytes, Absolute 0.41 (L) 0.60 - 4.80 10*3/mm3    Monocytes, Absolute 0.50 0.00 - 1.00 10*3/mm3    Eosinophils, Absolute 0.00 0.00 - 0.30 10*3/mm3    Basophils, Absolute 0.03 0.00 - 0.20 10*3/mm3    Immature Grans, Absolute 0.02 0.00 - 0.03 10*3/mm3   Urinalysis, Microscopic Only - Urine, Clean Catch    Collection Time: 07/10/18 12:23 PM   Result Value Ref Range    RBC, UA 7-12 (A) None Seen, 0-2 /HPF    WBC, UA Too Numerous to Count (A) None Seen, 0-2 /HPF    Bacteria, UA None Seen None Seen, Trace /HPF    Squamous Epithelial Cells, UA 0-2 None Seen, 0-2 /HPF    Yeast, UA Small/1+ Budding Yeast w/Hyphae None Seen /HPF    Hyaline Casts, UA 0-6 0 - 6 /LPF    Methodology Manual Light Microscopy    Influenza Antigen, Rapid - Swab, Nasopharynx    Collection Time: 07/10/18 12:24 PM   Result Value Ref Range    Influenza A Ag, EIA Negative Negative    Influenza B Ag, EIA Negative Negative     Note: In addition to lab results from this visit, the labs listed above may include labs taken at another facility or during a different encounter within the last 24 hours. Please correlate lab times with ED admission and discharge times for further clarification of the services performed during this visit.    CT Head Without Contrast    (Results Pending)   XR Chest 1 View    (Results Pending)     Vitals:    07/10/18 1217 07/10/18 1219 07/10/18 1230   BP:  132/66    Patient Position:  Lying    Pulse:  91    Resp:  (!) 38    Temp: (!) 101.9 °F (38.8 °C)     TempSrc: Rectal     SpO2:  (!) 86% 100%   Weight: 58.5 kg (129 lb)     Height: 162.6 cm (64\")       Medications   sodium chloride 0.9 % flush 10 mL (not administered)   vancomycin 1250 mg/250 mL 0.9% NS IVPB ([facility]) (not administered)   acetaminophen (TYLENOL) suppository 650 mg (650 mg Rectal Given 7/10/18 9304)   sodium chloride 0.9 % bolus 500 mL (0 mL Intravenous Stopped 7/10/18 1301) "   piperacillin-tazobactam (ZOSYN) 4.5 g in iso-osmotic dextrose 100 mL IVPB (premix) (4.5 g Intravenous New Bag 7/10/18 1257)     ECG/EMG Results (last 24 hours)     ** No results found for the last 24 hours. **                      Recent Results (from the past 24 hour(s))   POC Troponin, Rapid    Collection Time: 07/10/18 12:22 PM   Result Value Ref Range    Troponin I 0.04 0.00 - 0.07 ng/mL   Comprehensive Metabolic Panel    Collection Time: 07/10/18 12:23 PM   Result Value Ref Range    Glucose 101 (H) 70 - 100 mg/dL    BUN 20 9 - 23 mg/dL    Creatinine 1.58 (H) 0.60 - 1.30 mg/dL    Sodium 141 132 - 146 mmol/L    Potassium 4.7 3.5 - 5.5 mmol/L    Chloride 105 99 - 109 mmol/L    CO2 26.0 20.0 - 31.0 mmol/L    Calcium 8.6 (L) 8.7 - 10.4 mg/dL    Total Protein 6.5 5.7 - 8.2 g/dL    Albumin 3.72 3.20 - 4.80 g/dL    ALT (SGPT) 14 7 - 40 U/L    AST (SGOT) 25 0 - 33 U/L    Alkaline Phosphatase 62 25 - 100 U/L    Total Bilirubin 1.0 0.3 - 1.2 mg/dL    eGFR Non African Amer 31 (L) >60 mL/min/1.73    Globulin 2.8 gm/dL    A/G Ratio 1.3 (L) 1.5 - 2.5 g/dL    BUN/Creatinine Ratio 12.7 7.0 - 25.0    Anion Gap 10.0 3.0 - 11.0 mmol/L   Protime-INR    Collection Time: 07/10/18 12:23 PM   Result Value Ref Range    Protime 11.2 9.6 - 11.5 Seconds    INR 1.07 0.91 - 1.09   Urinalysis With Microscopic If Indicated (No Culture) - Urine, Catheter    Collection Time: 07/10/18 12:23 PM   Result Value Ref Range    Color, UA Yellow Yellow, Straw    Appearance, UA Cloudy (A) Clear    pH, UA <=5.0 5.0 - 8.0    Specific Gravity, UA 1.017 1.001 - 1.030    Glucose, UA Negative Negative    Ketones, UA Trace (A) Negative    Bilirubin, UA Negative Negative    Blood, UA Moderate (2+) (A) Negative    Protein, UA 30 mg/dL (1+) (A) Negative    Leuk Esterase, UA Large (3+) (A) Negative    Nitrite, UA Negative Negative    Urobilinogen, UA 0.2 E.U./dL 0.2 - 1.0 E.U./dL   Lactic Acid, Plasma    Collection Time: 07/10/18 12:23 PM   Result Value Ref Range     Lactate 1.6 0.5 - 2.0 mmol/L   Procalcitonin    Collection Time: 07/10/18 12:23 PM   Result Value Ref Range    Procalcitonin 0.30 (H) <=0.25 ng/mL   Magnesium    Collection Time: 07/10/18 12:23 PM   Result Value Ref Range    Magnesium 2.1 1.3 - 2.7 mg/dL   T4, Free    Collection Time: 07/10/18 12:23 PM   Result Value Ref Range    Free T4 1.24 0.89 - 1.76 ng/dL   TSH    Collection Time: 07/10/18 12:23 PM   Result Value Ref Range    TSH 0.163 (L) 0.350 - 5.350 mIU/mL   CBC Auto Differential    Collection Time: 07/10/18 12:23 PM   Result Value Ref Range    WBC 5.45 3.50 - 10.80 10*3/mm3    RBC 2.54 (L) 3.89 - 5.14 10*6/mm3    Hemoglobin 9.0 (L) 11.5 - 15.5 g/dL    Hematocrit 27.5 (L) 34.5 - 44.0 %    .3 (H) 80.0 - 99.0 fL    MCH 35.4 (H) 27.0 - 31.0 pg    MCHC 32.7 32.0 - 36.0 g/dL    RDW 17.8 (H) 11.3 - 14.5 %    RDW-SD 70.3 (H) 37.0 - 54.0 fl    MPV 12.0 6.0 - 12.0 fL    Platelets 146 (L) 150 - 450 10*3/mm3    Neutrophil % 82.7 (H) 41.0 - 71.0 %    Lymphocyte % 7.5 (L) 24.0 - 44.0 %    Monocyte % 9.2 0.0 - 12.0 %    Eosinophil % 0.0 0.0 - 3.0 %    Basophil % 0.6 0.0 - 1.0 %    Immature Grans % 0.4 0.0 - 0.6 %    Neutrophils, Absolute 4.51 1.50 - 8.30 10*3/mm3    Lymphocytes, Absolute 0.41 (L) 0.60 - 4.80 10*3/mm3    Monocytes, Absolute 0.50 0.00 - 1.00 10*3/mm3    Eosinophils, Absolute 0.00 0.00 - 0.30 10*3/mm3    Basophils, Absolute 0.03 0.00 - 0.20 10*3/mm3    Immature Grans, Absolute 0.02 0.00 - 0.03 10*3/mm3   Urinalysis, Microscopic Only - Urine, Clean Catch    Collection Time: 07/10/18 12:23 PM   Result Value Ref Range    RBC, UA 7-12 (A) None Seen, 0-2 /HPF    WBC, UA Too Numerous to Count (A) None Seen, 0-2 /HPF    Bacteria, UA None Seen None Seen, Trace /HPF    Squamous Epithelial Cells, UA 0-2 None Seen, 0-2 /HPF    Yeast, UA Small/1+ Budding Yeast w/Hyphae None Seen /HPF    Hyaline Casts, UA 0-6 0 - 6 /LPF    Methodology Manual Light Microscopy    Influenza Antigen, Rapid - Swab, Nasopharynx     Collection Time: 07/10/18 12:24 PM   Result Value Ref Range    Influenza A Ag, EIA Negative Negative    Influenza B Ag, EIA Negative Negative     Note: In addition to lab results from this visit, the labs listed above may include labs taken at another facility or during a different encounter within the last 24 hours. Please correlate lab times with ED admission and discharge times for further clarification of the services performed during this visit.    XR Chest 1 View   Final Result   Slight worsening identified of the markings at the right   lung base. Heart is borderline enlarged. The remainder of the lung   fields are clear.       D:  07/10/2018   E:  07/10/2018       This report was finalized on 7/10/2018 2:09 PM by Dr. Quiana Whalen MD.          CT Head Without Contrast    (Results Pending)     Vitals:    07/10/18 1530 07/10/18 1638 07/10/18 1843 07/10/18 1947   BP: 92/54  122/61 109/54   BP Location:   Left arm Left arm   Patient Position:   Lying Lying   Pulse: (!) 148  92 80   Resp:  20 14 15   Temp:  96 °F (35.6 °C) 97 °F (36.1 °C) 98 °F (36.7 °C)   TempSrc:  Axillary Axillary Axillary   SpO2: 100%  98%    Weight:       Height:         Medications   sodium chloride 0.9 % flush 10 mL (not administered)   sodium chloride 0.9 % bolus 1,000 mL (1,000 mL Intravenous Not Given 7/10/18 1507)   sodium chloride 0.9 % flush 1-10 mL (not administered)   heparin (porcine) 5000 UNIT/ML injection 5,000 Units (not administered)   sodium chloride 0.9 % infusion (125 mL/hr Intravenous New Bag 7/10/18 5537)   piperacillin-tazobactam (ZOSYN) 4.5 g in iso-osmotic dextrose 100 mL IVPB (premix) (not administered)     And   Pharmacy to dose vancomycin (not administered)   acetaminophen (TYLENOL) tablet 650 mg (not administered)   potassium chloride (MICRO-K) CR capsule 40 mEq (not administered)     Or   potassium chloride (KLOR-CON) packet 40 mEq (not administered)     Or   potassium chloride 10 mEq in 100 mL IVPB (not  administered)   docusate sodium (COLACE) capsule 100 mg (not administered)   vancomycin (VANCOCIN) in iso-osmotic dextrose IVPB 1 g (premix) 200 mL (not administered)   !vanc trough due prior to dose 7-12 1200. Hold dose until labs are back. Rx is following. (not administered)   acetaminophen (TYLENOL) suppository 650 mg (650 mg Rectal Given 7/10/18 1229)   sodium chloride 0.9 % bolus 500 mL (0 mL Intravenous Stopped 7/10/18 1301)   vancomycin 1250 mg/250 mL 0.9% NS IVPB ([facility]) (0 mg/kg × 58.5 kg Intravenous Stopped 7/10/18 1537)   piperacillin-tazobactam (ZOSYN) 4.5 g in iso-osmotic dextrose 100 mL IVPB (premix) (0 g Intravenous Stopped 7/10/18 1408)   sodium chloride 0.9 % bolus 500 mL (0 mL Intravenous Stopped 7/10/18 1510)   sodium chloride 0.9 % bolus 1,000 mL (1,000 mL Intravenous New Bag 7/10/18 1615)     ECG/EMG Results (last 24 hours)     Procedure Component Value Units Date/Time    ECG 12 Lead [[id number]] Collected:  07/10/18 1229     Updated:  07/10/18 1234            MDM    Final diagnoses:   Altered mental status, unspecified altered mental status type   Sepsis, due to unspecified organism (CMS/[facility])   Urinary tract infection with hematuria, site unspecified   Renal insufficiency   Anemia, unspecified type       Documentation assistance provided by estevan Woods.  Information recorded by the scribe was done at my direction and has been verified and validated by me.     Julio César Woods  07/10/18 1214       Julio César Woods  07/10/18 1344       Elvis Orozco MD  07/10/18 2100

## 2018-07-10 NOTE — H&P
[facility] Medicine Services  HISTORY AND PHYSICAL    Patient Name: Arabella Gomes  : 1927  MRN: [id number]  Primary Care Physician: No Known Provider    Subjective     Chief Complaint: AMS     HPI:  Arabella Gomes is a 89 y.o. female is a 89yo female with PMH significant for Alzheimer's dementia (followed by Dr. Verde), anxiety, history of CVA, CKD III and anemia. She is a resident of the memory care unit at [facility]. She was recently admitted to [facility] -18 due to concerns for pneumonia. Ultimately, respiratory PCR (+) for rhinovirus. She was treated empirically with broad spectrum antibiotics and discharged to her memory care unit on Doxycycline PO.     She returned to [facility] ED on 7/10/18 for evaluation of altered mental status and increasing lethargy. Patient unable to provide history and all history is obtained from the chart. Per chart review, she was recently treated for a possible UTI with antibiotics. Unclear which antibiotic the patient was given. Patient reportedly febrile at her memory care unit today and EMS was notified.     ED evaluation revealed UTI. Labs show WBCs 5.4, hemoglobin 9.0. Chemistry notable for KARIN with creatinine 1.5 (baseline 0.8-1.0), lactate 1.6 and procalcitonin 0.3. She was given IV Vancomycin/Zosyn and a 500mL bolus in the ED. Hospital medicine will admit.     Review of Systems   Unable to perform ROS: Mental status change      Otherwise 10-system ROS reviewed and is negative except as mentioned in the HPI.    Personal History     Past Medical History:   Diagnosis Date   • Anemia of chronic disease    • Anxiety    • CKD (chronic kidney disease) stage 3, GFR 30-59 ml/min    • Dementia    • LBBB (left bundle branch block)      Past Surgical History:   Procedure Laterality Date   • NO PAST SURGERIES       Family History: family history includes Heart failure in her mother; Stroke in her father.     Social  History:  reports that she has never smoked. She has never used smokeless tobacco. She reports that she does not drink alcohol or use drugs.     Social History     Social History Narrative    ** Merged History Encounter **          Medications:    (Not in a hospital admission)    No Known Allergies    Objective     Vital Signs:   Temp:  [101.9 °F (38.8 °C)] 101.9 °F (38.8 °C)  Heart Rate:  [85-91] 85  Resp:  [38] 38  BP: ()/(49-66) 94/49        Physical Exam   Constitutional: Patient sleeping, unable to rouse. Elderly, non-toxic.   Eyes: PERRLA, sclerae anicteric, no conjunctival injection  HENT: NCAT, mucous membranes dry   Neck: Supple, no thyromegaly, no lymphadenopathy, trachea midline  Respiratory: Clear to auscultation bilaterally, nonlabored respirations   Cardiovascular: RRR, no murmurs, rubs, or gallops, palpable pedal pulses bilaterally  Gastrointestinal: Positive bowel sounds, soft, nontender, nondistended  Musculoskeletal: No bilateral ankle edema, no clubbing or cyanosis to extremities  Psychiatric: Unable to assess  Neurologic: No focal deficits  Skin: No rashes. Skin is thin, diffusely dry and flaky.     Results Reviewed:  I have personally reviewed current lab, radiology, and data and agree.      Results from last 7 days  Lab Units 07/10/18  1223   WBC 10*3/mm3 5.45   HEMOGLOBIN g/dL 9.0*   HEMATOCRIT % 27.5*   PLATELETS 10*3/mm3 146*   INR  1.07       Results from last 7 days  Lab Units 07/10/18  1223   SODIUM mmol/L 141   POTASSIUM mmol/L 4.7   CHLORIDE mmol/L 105   CO2 mmol/L 26.0   BUN mg/dL 20   CREATININE mg/dL 1.58*   GLUCOSE mg/dL 101*   CALCIUM mg/dL 8.6*   ALT (SGPT) U/L 14   AST (SGOT) U/L 25     Estimated Creatinine Clearance: 21.4 mL/min (A) (by C-G formula based on SCr of 1.58 mg/dL (H)).     Brief Urine Lab Results  (Last result in the past 365 days)      Color   Clarity   Blood   Leuk Est   Nitrite   Protein   CREAT   Urine HCG        07/10/18 1223 Yellow Cloudy(A) Moderate  (2+)(A) Large (3+)(A) Negative 30 mg/dL (1+)(A)             No results found for: BNP     Imaging Results (last 24 hours)     Procedure Component Value Units Date/Time    XR Chest 1 View [[id number]] Collected:  07/10/18 1409     Updated:  07/10/18 1411    Narrative:       EXAMINATION: XR CHEST 1 VW- 07/10/2018     INDICATION: AMS; R41.82-Altered mental status, unspecified;  A41.9-Sepsis, unspecified organism; N39.0-Urinary tract infection, site  not specified; R31.9-Hematuria, unspecified; N28.9-Disorder of kidney  and ureter, unspecified; D64.9-Anemia, unspecified      COMPARISON: 06/16/2018     FINDINGS: Portable chest reveals patchy ill-defined opacification seen  within the right lower lung field slightly worsened when compared to the  prior study. Minimal increased markings identified at the left lung  base. Degenerative changes seen within the spine. Vascular  calcifications seen within the thoracic aorta.           Impression:       Slight worsening identified of the markings at the right  lung base. Heart is borderline enlarged. The remainder of the lung  fields are clear.     D:  07/10/2018  E:  07/10/2018     This report was finalized on 7/10/2018 2:09 PM by Dr. Quiana Whalen MD.       CT Head Without Contrast [[id number]] Updated:  07/10/18 1306        Assessment / Plan     Hospital Problem List     * (Principal)Sepsis (CMS/[facility])    Alzheimer's disease (Chronic)    Acute kidney injury (CMS/[facility])    CKD (chronic kidney disease) stage 3, GFR 30-59 ml/min    Acute cystitis with hematuria    Right lower lobe pneumonia (CMS/[facility])    Fever    Hypotension        Ms. Arabella Gomes is a 89yo female with PMH significant for Alzheimer's dementia and CKD III. Admitted to [facility] 6/16-6/20/18 with confusion, fevers/chills and cough. Treated for pneumonia. Respiratory PCR (+) rhinovirus. She returned to [facility] ED 7/10/18 with AMS. Evaluation revealed sepsis secondary to UTI and LLL pneumonia.     Sepsis   Possible right  lower lobe pneumonia  UTI  - Sepsis as evidenced by fever, hypotension, altered mental status, tachycardia and KARIN  - Given her possible new RLL pneumonia and recent hospitalization, treat with broad spectrum Vancomycin/Zosyn  - Follow urine cultures, check respiratory cultures  - O2 PRN   - Continue aggressive IV fluid resuscitation with boluses as needed followed by maintenance fluids overnight     KARIN on CKD III  - IV fluids as above, AM labs     Alzheimer's dementia, continue home medications     DVT prophylaxis: Heparin     CODE STATUS:    Code Status and Medical Interventions:   Ordered at: 07/10/18 1457     Limited Support to NOT Include:    Artificial Nutrition     Code Status:    No CPR     Medical Interventions (Level of Support Prior to Arrest):    Limited     Comments:    Living will reviewed     Admission Status:  I believe this patient meets INPATIENT status due to the need for care which can only be reasonably provided in an hospital setting such as aggressive/expedited ancillary services and/or consultation services, the necessity for IV medications, close physician monitoring and/or the possible need for procedures.  In such, I feel patient’s risk for adverse outcomes and need for care warrant INPATIENT evaluation and predict the patient’s care encounter to likely last beyond 2 midnights.    Electronically signed by Kathie Gutierrez PA-C, 07/10/18, 2:28 PM.      Brief Attending Admission Attestation     I have seen and examined the patient, performing an independent face-to-face diagnostic evaluation with plan of care reviewed and developed with the advanced practice clinician (APC).      Brief Summary Statement/HPI:   Arabella Gomes is a 89 y.o. female with recent hospitalization for pneumonia (rhinovirus positive), dementia, history of leukemia in remission, presenting from her nursing facility with altered mental status associated with fever following recent treatment for UTI at her  facility.   In our ED she was found to have UTI and possible right sided pneumonia.  She was unable to provide any history due to mental status.  Since her arrival, she has declined despite aggressive fluid resuscitation with increased tachycardia and hypotension.  Her brother is present and verifies her DNR/DNI status and wants her to be comfortable if she further deteriorates despite aggressive measures.    Attending Physical Exam:  Constitutional: No acute distress, sleeping, does not awaken to sternal rub but stirs and coughs when bed alarm goes off  Eyes: Sclerae anicteric, no conjunctival injection  HENT: NCAT, mucous membranes dry  Neck: Supple, trachea midline  Respiratory: Clear to auscultation bilaterally, nonlabored respirations, snoring   Cardiovascular: Tachycardic with regular rhythm, no murmurs, rubs, or gallops  Gastrointestinal: Positive bowel sounds, soft, nontender, nondistended  Musculoskeletal: No bilateral ankle edema, no clubbing or cyanosis to extremities  Psychiatric: Somnolent  Neurologic: No facial asymmetry.  Does not awaken to verbal or tactile stimuli  Skin: No rashes      Brief Assessment/Plan :  See above for further detailed assessment and plan developed with APC which I have reviewed and/or edited.    Discussed with patient's brother who is her next of kin that she is critically ill and we will continue to be aggressive with fluid and antibiotics but if she were to deteriorate further despite those measures, we will make her comfortable without heroic measures as this is in line with her previously expressed wishes.  He understands that she may die from this illness.    Electronically signed by Carmen Downs MD, 07/10/18, 4:41 PM.     Critical Care time spent in direct patient care:    45 minutes (excluding procedure time, if applicable) including high complexity decision making to assess and treat vital organ system failure in this individual who has impairment of one or more vital  organ systems such that there is a high probability of imminent or life threatening deterioration in the patient’s condition and failure to initiate the above interventions on an urgent basis would likely result in sudden, clinically significant or life threatening deterioration in the patient's condition.  4:48 PM

## 2018-07-11 LAB
ANION GAP SERPL CALCULATED.3IONS-SCNC: 8 MMOL/L (ref 3–11)
BUN BLD-MCNC: 18 MG/DL (ref 9–23)
BUN/CREAT SERPL: 14.4 (ref 7–25)
CALCIUM SPEC-SCNC: 7.6 MG/DL (ref 8.7–10.4)
CHLORIDE SERPL-SCNC: 115 MMOL/L (ref 99–109)
CO2 SERPL-SCNC: 21 MMOL/L (ref 20–31)
CREAT BLD-MCNC: 1.25 MG/DL (ref 0.6–1.3)
DEPRECATED RDW RBC AUTO: 71 FL (ref 37–54)
ERYTHROCYTE [DISTWIDTH] IN BLOOD BY AUTOMATED COUNT: 18 % (ref 11.3–14.5)
GFR SERPL CREATININE-BSD FRML MDRD: 40 ML/MIN/1.73
GLUCOSE BLD-MCNC: 78 MG/DL (ref 70–100)
HCT VFR BLD AUTO: 23.2 % (ref 34.5–44)
HGB BLD-MCNC: 7.5 G/DL (ref 11.5–15.5)
MCH RBC QN AUTO: 35.5 PG (ref 27–31)
MCHC RBC AUTO-ENTMCNC: 32.3 G/DL (ref 32–36)
MCV RBC AUTO: 110 FL (ref 80–99)
PLATELET # BLD AUTO: 110 10*3/MM3 (ref 150–450)
PMV BLD AUTO: 12.5 FL (ref 6–12)
POTASSIUM BLD-SCNC: 4 MMOL/L (ref 3.5–5.5)
RBC # BLD AUTO: 2.11 10*6/MM3 (ref 3.89–5.14)
SODIUM BLD-SCNC: 144 MMOL/L (ref 132–146)
WBC NRBC COR # BLD: 4.51 10*3/MM3 (ref 3.5–10.8)

## 2018-07-11 PROCEDURE — 25010000002 PIPERACILLIN SOD-TAZOBACTAM PER 1 G: Performed by: PHYSICIAN ASSISTANT

## 2018-07-11 PROCEDURE — 25010000002 HEPARIN (PORCINE) PER 1000 UNITS: Performed by: PHYSICIAN ASSISTANT

## 2018-07-11 PROCEDURE — 85027 COMPLETE CBC AUTOMATED: CPT | Performed by: PHYSICIAN ASSISTANT

## 2018-07-11 PROCEDURE — 99233 SBSQ HOSP IP/OBS HIGH 50: CPT | Performed by: HOSPITALIST

## 2018-07-11 PROCEDURE — 92610 EVALUATE SWALLOWING FUNCTION: CPT

## 2018-07-11 PROCEDURE — 25010000002 VANCOMYCIN PER 500 MG

## 2018-07-11 PROCEDURE — 80048 BASIC METABOLIC PNL TOTAL CA: CPT | Performed by: PHYSICIAN ASSISTANT

## 2018-07-11 RX ORDER — VANCOMYCIN HYDROCHLORIDE 1 G/200ML
15 INJECTION, SOLUTION INTRAVENOUS EVERY 24 HOURS
Status: DISCONTINUED | OUTPATIENT
Start: 2018-07-11 | End: 2018-07-12

## 2018-07-11 RX ORDER — SODIUM CHLORIDE 9 MG/ML
125 INJECTION, SOLUTION INTRAVENOUS CONTINUOUS
Status: ACTIVE | OUTPATIENT
Start: 2018-07-11 | End: 2018-07-11

## 2018-07-11 RX ADMIN — TAZOBACTAM SODIUM AND PIPERACILLIN SODIUM 4.5 G: 500; 4 INJECTION, SOLUTION INTRAVENOUS at 12:00

## 2018-07-11 RX ADMIN — HEPARIN SODIUM 5000 UNITS: 5000 INJECTION, SOLUTION INTRAVENOUS; SUBCUTANEOUS at 08:18

## 2018-07-11 RX ADMIN — SODIUM CHLORIDE 125 ML/HR: 9 INJECTION, SOLUTION INTRAVENOUS at 08:18

## 2018-07-11 RX ADMIN — TAZOBACTAM SODIUM AND PIPERACILLIN SODIUM 4.5 G: 500; 4 INJECTION, SOLUTION INTRAVENOUS at 20:11

## 2018-07-11 RX ADMIN — VANCOMYCIN HYDROCHLORIDE 1000 MG: 1 INJECTION, SOLUTION INTRAVENOUS at 16:29

## 2018-07-11 RX ADMIN — ACETAMINOPHEN 650 MG: 325 TABLET, FILM COATED ORAL at 20:10

## 2018-07-11 RX ADMIN — SODIUM CHLORIDE 125 ML/HR: 9 INJECTION, SOLUTION INTRAVENOUS at 00:56

## 2018-07-11 RX ADMIN — TAZOBACTAM SODIUM AND PIPERACILLIN SODIUM 4.5 G: 500; 4 INJECTION, SOLUTION INTRAVENOUS at 03:55

## 2018-07-11 RX ADMIN — SODIUM CHLORIDE 125 ML/HR: 9 INJECTION, SOLUTION INTRAVENOUS at 08:34

## 2018-07-11 RX ADMIN — HEPARIN SODIUM 5000 UNITS: 5000 INJECTION, SOLUTION INTRAVENOUS; SUBCUTANEOUS at 20:10

## 2018-07-11 RX ADMIN — SODIUM CHLORIDE 125 ML/HR: 9 INJECTION, SOLUTION INTRAVENOUS at 16:29

## 2018-07-11 NOTE — PROGRESS NOTES
[facility] Medicine Services  PROGRESS NOTE    Patient Name: Arabella Gomes  : 1927  MRN: [id number]    Date of Admission: 7/10/2018  Length of Stay: 1  Primary Care Physician: No Known Provider    Subjective   Subjective     CC:  F/U AMS    HPI:  Patient seen this morning. Remains somnolent.     Review of Systems  Unable to obtain due to mental status    Otherwise ROS is negative except as mentioned in the HPI.    Objective   Objective     Vital Signs:   Temp:  [96 °F (35.6 °C)-99.9 °F (37.7 °C)] 98 °F (36.7 °C)  Heart Rate:  [] 75  Resp:  [14-20] 16  BP: ()/(54-78) 139/78        Physical Exam:  Gen-no acute distress  CV-RRR, S1 S2 normal, no m/r/g  Resp-CTAB, no wheezes  Abd-soft, NT, ND, +BS  Ext-no edema  Neuro-somnolent, briefly open eyes, no focal deficits  Psych-appropriate mood      Results Reviewed:  I have personally reviewed current lab, radiology, and data and agree.      Results from last 7 days  Lab Units 18  0420 07/10/18  1223   WBC 10*3/mm3 4.51 5.45   HEMOGLOBIN g/dL 7.5* 9.0*   HEMATOCRIT % 23.2* 27.5*   PLATELETS 10*3/mm3 110* 146*   INR   --  1.07       Results from last 7 days  Lab Units 18  0420 07/10/18  1223   SODIUM mmol/L 144 141   POTASSIUM mmol/L 4.0 4.7   CHLORIDE mmol/L 115* 105   CO2 mmol/L 21.0 26.0   BUN mg/dL 18 20   CREATININE mg/dL 1.25 1.58*   GLUCOSE mg/dL 78 101*   CALCIUM mg/dL 7.6* 8.6*   ALT (SGPT) U/L  --  14   AST (SGOT) U/L  --  25     Estimated Creatinine Clearance: 27.1 mL/min (by C-G formula based on SCr of 1.25 mg/dL).  No results found for: BNP    Microbiology Results Abnormal     Procedure Component Value - Date/Time    Blood Culture - Blood, [[id number]] Collected:  07/10/18 1240    Lab Status:  Preliminary result Specimen:  Blood from Arm, Left Updated:  18 1315     Blood Culture No growth at 24 hours      Aerobic bottle only    Blood Culture - Blood, [[id number]]  (Normal) Collected:  07/10/18 1250     Lab Status:  Preliminary result Specimen:  Blood from Wrist, Left Updated:  07/11/18 1315     Blood Culture No growth at 24 hours    Urine Culture - Urine, Urine, Catheter [[id number]]  (Normal) Collected:  07/10/18 1223    Lab Status:  Preliminary result Specimen:  Urine from Urine, Catheter Updated:  07/11/18 0914     Urine Culture No growth    Influenza Antigen, Rapid - Swab, Nasopharynx [[id number]]  (Normal) Collected:  07/10/18 1224    Lab Status:  Final result Specimen:  Swab from Nasopharynx Updated:  07/10/18 1247     Influenza A Ag, EIA Negative     Influenza B Ag, EIA Negative          Imaging Results (last 24 hours)     Procedure Component Value Units Date/Time    CT Head Without Contrast [[id number]] Collected:  07/11/18 0802     Updated:  07/11/18 0916    Narrative:       EXAMINATION: CT HEAD WO CONTRAST- 07/10/2018     INDICATION: Mental status change, unresponsive.     TECHNIQUE: Multiple axial CT imaging was obtained of the head from the  skull base to the skull vertex without the administration of intravenous  contrast.     The radiation dose reduction device was turned on for each scan per the  ALARA (As Low as Reasonably Achievable) protocol.     COMPARISON: None.     FINDINGS: There is atrophy identified of the brain. Some low-density  area is seen in the periventricular white matter suggesting chronic  small vessel ischemic change. No hemorrhage or hydrocephalus. No mass,  mass effect, or midline shift. No abnormal extraaxial fluid collection  is identified. The bony structures reveal no evidence of osseous  abnormality. Visualized paranasal sinuses are clear. The mastoid air  cells are patent.       Impression:       Atrophy and chronic changes seen within the brain with no  acute intracranial abnormality identified.     D:  07/10/2018  E:  07/11/2018     This report was finalized on 7/11/2018 9:14 AM by Dr. Quiana Whalen MD.                I have reviewed the medications.    Assessment/Plan    Assessment / Plan     Hospital Problem List     * (Principal)Sepsis (CMS/[facility])    Alzheimer's disease (Chronic)    Acute kidney injury (CMS/[facility])    CKD (chronic kidney disease) stage 3, GFR 30-59 ml/min    Acute cystitis with hematuria    Right lower lobe pneumonia (CMS/[facility])    Fever    Hypotension             Brief Hospital Course to date:  Arabella Gomes is a 89 y.o. female with PMH significant for Alzheimer's dementia and CKD III. Admitted to [facility] 6/16-6/20/18 with confusion, fevers/chills and cough. Treated for pneumonia. Respiratory PCR (+) rhinovirus. Discharged on Doxycycline. She returned to [facility] ED 7/10/18 with AMS. Evaluation revealed sepsis secondary to UTI and RLL pneumonia.       Assessment & Plan:  --Continue Vanc and Zosyn. Follow up cultures.   --Continue fluids for today. KARIN improved.  --H&H down today but likely dilutional related to fluid resuscitation. Repeat labs in AM.  --Consider Palliative Care consult if no improvement.       DVT Prophylaxis:  [facility]    CODE STATUS:   Code Status and Medical Interventions:   Ordered at: 07/10/18 1457     Limited Support to NOT Include:    Artificial Nutrition     Code Status:    No CPR     Medical Interventions (Level of Support Prior to Arrest):    Limited     Comments:    Living will reviewed       Disposition: I expect the patient to be discharged TBD      Electronically signed by Sarah Lockwood MD, 07/11/18, 2:49 PM.

## 2018-07-11 NOTE — CONSULTS
"Pharmacy Consult-Vancomycin Dosing  Arabella Gomes is a  89 y.o. female receiving vancomycin therapy.     Indication: pneumonia  Consulting Provider: Kathie Gutierrez PA-C  ID Consult: no    Goal Trough: 15-20    Current Antimicrobial Therapy  Anti-Infectives       Ordered     Dose/Rate Route Frequency Start Stop    07/10/18 1958  vancomycin (VANCOCIN) in iso-osmotic dextrose IVPB 1 g (premix) 200 mL     Ordering Provider:  Kathie Gutierrez PA-C    15 mg/kg × 58.5 kg  over 60 Minutes Intravenous Every 24 Hours 07/11/18 1200 07/21/18 1159    07/10/18 1635  piperacillin-tazobactam (ZOSYN) 4.5 g in iso-osmotic dextrose 100 mL IVPB (premix)     Comments:  Changed from q6h per extended inf protocol   Ordering Provider:  Kathie Gutierrez PA-C    4.5 g  over 4 Hours Intravenous Every 8 Hours 07/10/18 2000 07/17/18 1959    07/10/18 1635  Pharmacy to dose vancomycin     Comments:  Arabella Gomes  5G, S-[id number]  By Kathie Gutierrez PA-C   Ordering Provider:  Kathie Gutierrez PA-C     Does not apply Continuous PRN 07/10/18 1635 07/17/18 1634    07/10/18 1235  vancomycin 1250 mg/250 mL 0.9% NS IVPB ([facility])     Ordering Provider:  Elvis Orozco MD    20 mg/kg × 58.5 kg Intravenous Once 07/10/18 1237 07/10/18 1537    07/10/18 1235  piperacillin-tazobactam (ZOSYN) 4.5 g in iso-osmotic dextrose 100 mL IVPB (premix)     Ordering Provider:  Elvis Orozco MD    4.5 g Intravenous Once 07/10/18 1237 07/10/18 1408        Allergies  Allergies as of 07/10/2018   • (No Known Allergies)     Labs    Results from last 7 days     Lab Units 07/10/18  1223   WBC 10*3/mm3 5.45   Evaluation of Dosing     Last Dose Received in the ED/Outside Facility:  1250 mg at 14:09 (20 mg/kg)    Ht - 162.6 cm (64\")  Wt - 58.5 kg (129 lb)    Estimated Creatinine Clearance: 21.4 mL/min (A) (by C-G formula based on SCr of 1.58 mg/dL (H)).    Intake & Output (last 3 days)         07/08 0701 - 07/09 0700 07/09 0701 - 07/10 0700 07/10 0701 - " 07/11 0700    I.V. (mL/kg)   214 (3.7)    IV Piggyback   2600    Total Intake(mL/kg)   2814 (48.1)    Net     +2814           Unmeasured Urine Occurrence   1 x          Microbiology and Radiology  Microbiology Results (last 10 days)       Procedure Component Value - Date/Time    Influenza Antigen, Rapid - Swab, Nasopharynx [[id number]]  (Normal) Collected:  07/10/18 1224    Lab Status:  Final result Specimen:  Swab from Nasopharynx Updated:  07/10/18 1247     Influenza A Ag, EIA Negative     Influenza B Ag, EIA Negative        Evaluation of Level    Assessment/Plan:  Loading dose of 1250 mg x 1, followed by 15 mg/kg (1000 mg) q24h.  Vanc trough due prior to dose 7-12 1200.    Odilia Couch RPH  7/10/2018  20:12

## 2018-07-11 NOTE — THERAPY EVALUATION
Acute Care - Speech Language Pathology   Swallow Initial Evaluation [facility]   Clinical Swallow Evaluation     Patient Name: Arabella Gomes  : 1927  MRN: [id number]  Today's Date: 2018               Admit Date: 7/10/2018    Visit Dx:     ICD-10-CM ICD-9-CM   1. Altered mental status, unspecified altered mental status type R41.82 780.97   2. Sepsis, due to unspecified organism (CMS/HCC) A41.9 038.9     995.91   3. Urinary tract infection with hematuria, site unspecified N39.0 599.0    R31.9    4. Renal insufficiency N28.9 593.9   5. Anemia, unspecified type D64.9 285.9   6. Dysphagia, unspecified type R13.10 787.20     Patient Active Problem List   Diagnosis   • Alzheimer's disease   • Low back pain   • Acute kidney injury (CMS/HCC)   • Chronic Macrocytic anemia   • Failure to thrive in adult, likely due to moderate-severe dementia   • Dysphagia   • Moderate protein-calorie malnutrition (CMS/HCC)   • CKD (chronic kidney disease) stage 3, GFR 30-59 ml/min   • Sepsis (CMS/HCC)   • Acute cystitis with hematuria   • Right lower lobe pneumonia (CMS/HCC)   • Fever   • Hypotension     Past Medical History:   Diagnosis Date   • Anemia of chronic disease    • Anxiety    • CKD (chronic kidney disease) stage 3, GFR 30-59 ml/min    • Dementia    • LBBB (left bundle branch block)      Past Surgical History:   Procedure Laterality Date   • NO PAST SURGERIES            SWALLOW EVALUATION (last 72 hours)      [facility] Adult Swallow Evaluation     Row Name 18 0915       Rehab Evaluation    Document Type evaluation  -RD    Subjective Information no complaints  -RD    Patient Observations lethargic  -RD    Patient/Family Observations Pt's brother was present   -RD    Patient Effort fair  -RD       General Information    Patient Profile Reviewed yes  -RD    Pertinent History Of Current Problem Adm w/ sepsis, RLL PNA. Hx of Alz dz, CKD. LTC at the [place].   -RD    Current Method of Nutrition regular textures;thin  liquids  -RD    Prior Level of Function-Communication other (see comments)   baseline Alz dz  -RD    Prior Level of Function-Swallowing mechanical soft textures;thin liquids  -RD    Plans/Goals Discussed with patient;family;agreed upon  -RD    Barriers to Rehab cognitive status  -RD    Patient's Goals for Discharge patient did not state  -RD    Family Goals for Discharge patient able to eat/drink without coughing/choking;declined (alternate means of nutrition/hydration)  -RD       Pain Assessment    Additional Documentation Pain Scale: FACES Pre/Post-Treatment (Group)  -RD       Pain Scale: FACES Pre/Post-Treatment    Pain: FACES Scale, Pretreatment 2-->hurts little bit  -RD    Pain: FACES Scale, Post-Treatment 2-->hurts little bit  -RD       Oral Motor and Function    Dentition Assessment poor oral hygiene  -RD    Secretion Management dried secretions in oral cavity  -RD    Mucosal Quality dry;sticky;cracked  -RD    Volitional Swallow delayed  -RD    Volitional Cough weak  -RD       Oral Musculature and Cranial Nerve Assessment    Oral Motor General Assessment generalized oral motor weakness  -RD       General Eating/Swallowing Observations    Respiratory Support Currently in Use room air  -RD    Eating/Swallowing Skills fed by SLP  -RD    Positioning During Eating upright 90 degree;upright in bed  -RD    Utensils Used spoon;cup;straw  -RD    Consistencies Trialed thin liquids;nectar/syrup-thick liquids;pudding thick;regular textures  -RD       Clinical Swallow Eval    Oral Prep Phase impaired  -RD    Oral Transit impaired  -RD    Pharyngeal Phase suspected pharyngeal impairment  -RD    Esophageal Phase unremarkable  -RD    Clinical Swallow Evaluation Summary BS eval complete. Pt lethargic. Trials given of thins, nectar, and pureed. Attempted solid, but pt refused to bite into cracker, suspect cognition impacting along w/ generalized oral weakness. Oral holding noted w/ all consistencies. Overt s/s of aspiration  c/b multiple swallows, inconsistent throat clear and cough w/ thins. Pt unable to control bolus size w/ straw. No s/s w/ nectar-thick or pureed. Pt's brother was present prior to PO trials, he reported that pt does not want any tube feeding and they do not wish to pursue a swallow study at this time. Pt's brother approved a modified diet temporarily if necessary. RECS: Nectar-thick liquids, no straws, Level 4-mech soft, meds crushed in pureed, general aspiration precautions, Oral care BID and PRN, f/u for DT/adjustments w/ diet.   -RD       Oral Prep Concerns    Oral Prep Concerns incomplete or weak lip closure around spoon;incomplete bolus preparation;oral holding  -RD    Oral Holding all consistencies  -RD    Incomplete or Weak Lip Closure Around Spoon all consistencies  -RD    Incomplete Bolus Preparation regular consistencies  -RD    Oral Prep Concerns, Comment Pt unable to bite into cracker, suspect cognition and oral wkns impacting  -RD       Oral Transit Concerns    Oral Transit Concerns increased oral transit time  -RD    Increased Oral Transit Time all consistencies  -RD       Pharyngeal Phase Concerns    Pharyngeal Phase Concerns multiple swallows;cough;throat clear  -RD    Multiple Swallows thin  -RD    Cough thin  -RD    Throat Clear thin  -RD    Pharyngeal Phase Concerns, Comment Suspect pharyngeal dysphagia. Family declined a swallow study and tube feeding. Agreed to modified diet  -RD       Clinical Impression    SLP Swallowing Diagnosis mild-moderate;oral dysfunction;suspected pharyngeal dysfunction  -RD    Functional Impact risk of aspiration/pneumonia;risk of malnutrition;risk of dehydration  -RD    Rehab Potential/Prognosis, Swallowing re-evaluate goals as necessary  -RD    Criteria for Skilled Therapeutic Interventions Met demonstrates skilled criteria  -RD       Recommendations    Therapy Frequency (Swallow) evaluation only;PRN  -RD    Predicted Duration Therapy Intervention (Days) until  discharge  -RD    SLP Diet Recommendation mechanical soft with no mixed consistencies;nectar thick liquids  -RD    Recommended Diagnostics other (see comments)   f/u for diet tolerance  -RD    Recommended Precautions and Strategies upright posture during/after eating;small bites of food and sips of liquid;no straw  -RD    SLP Rec. for Method of Medication Administration meds crushed;with pudding or applesauce;as tolerated  -RD    Monitor for Signs of Aspiration yes;notify SLP if any concerns  -RD    Anticipated Dischage Disposition unknown  -RD      User Key  (r) = Recorded By, (t) = Taken By, (c) = Cosigned By    Initials Name Effective Dates    RD Ariane Moran, MS CCC-SLP 04/03/18 -         EDUCATION  The patient has been educated in the following areas:   Dysphagia (Swallowing Impairment) Oral Care/Hydration NPO rationale Modified Diet Instruction.    SLP Recommendation and Plan  SLP Swallowing Diagnosis: mild-moderate, oral dysfunction, suspected pharyngeal dysfunction  SLP Diet Recommendation: mechanical soft with no mixed consistencies, nectar thick liquids  Recommended Precautions and Strategies: upright posture during/after eating, small bites of food and sips of liquid, no straw     Monitor for Signs of Aspiration: yes, notify SLP if any concerns  Recommended Diagnostics: other (see comments) (f/u for diet tolerance)  Criteria for Skilled Therapeutic Interventions Met: demonstrates skilled criteria  Anticipated Dischage Disposition: unknown  Rehab Potential/Prognosis, Swallowing: re-evaluate goals as necessary  Therapy Frequency (Swallow): evaluation only, PRN  Predicted Duration Therapy Intervention (Days): until discharge       Plan of Care Reviewed With: patient, sibling  Plan of Care Review  Plan of Care Reviewed With: patient, sibling          SLP GOALS     Row Name 07/11/18 0915             Oral Nutrition/Hydration Goal 1 (SLP)    Oral Nutrition/Hydration Goal 1, SLP LTG: Pt will tolerate soft  whole foods and thin liquids w/ 100% accuracy w/ min cues.   -RD      Time Frame (Oral Nutrition/Hydration Goal 1, SLP) by discharge  -RD         Swallow Management Recall Goal (SLP)    Swallow Management Recall Goal (SLP) Pt will tolerate Nectar-thick and level 4 dys diet (no straws) w/ no overt s/s of aspiration or complications w/ 100% accuracy w/ min cues and assist  -RD      Time Frame (Swallow Management Recall Goal, SLP) short term goal (STG);by discharge  -RD         Swallow Compensatory Strategies Goal (SLP)    Swallow Compensatory Strategies/Techniques Goal (SLP) Pt will tolerate trials of thin liquids w/ no overt s/s of aspiration and complications w/ 80% accuracy w/ min cues.   -RD      Time Frame (Swallow Compensatory Strategies/Techniques Goal, SLP) short term goal (STG);by discharge  -RD        User Key  (r) = Recorded By, (t) = Taken By, (c) = Cosigned By    Initials Name Provider Type    SUMMER Moran MS CCC-SLP Speech and Language Pathologist               Time Calculation:         Time Calculation- SLP     Row Name 07/11/18 1049             Time Calculation- SLP    SLP Start Time 0915  -RD      SLP Received On 07/11/18  -RD        User Key  (r) = Recorded By, (t) = Taken By, (c) = Cosigned By    Initials Name Provider Type    SUMMER Moran MS CCC-SLP Speech and Language Pathologist          Therapy Charges for Today     Code Description Service Date Service Provider Modifiers Qty    [id number] HC ST EVAL ORAL PHARYNG SWALLOW 5 7/11/2018 Ariane Moran MS CCC-GARCIA GN 1               MS LESLEY Dangelo  7/11/2018

## 2018-07-11 NOTE — PROGRESS NOTES
Discharge Planning Assessment  [facility]     Patient Name: Arabella Gomes  MRN: [id number]  Today's Date: 7/11/2018    Admit Date: 7/10/2018          Discharge Needs Assessment     Row Name 07/11/18 1100       Living Environment    Lives With facility resident    Current Living Arrangements extended care facility    Living Arrangement Comments The pt is from [facility] the [facility] [phone] (fax [phone])       Discharge Needs Assessment    Equipment Currently Used at Home wheelchair    Equipment Needed After Discharge none    Discharge Coordination/Progress I spoke with the pts nurse at the [facility]. The pt uses a WC but has had to have staff assistance with mobility and bathing. The pt is able to feed herself.            Discharge Plan     Row Name 07/11/18 8483       Plan    Plan IDP    Patient/Family in Agreement with Plan yes    Plan Comments The pt is unable to answer my questions. I left a message for the pts brother Toby Gomes (he is listed as POA and it is on file) to call me. I got the initial info from the pts nurse for the IDP. The pt will need to be back to baseline to return to the facility. The DON of the facility may need to see the pt onsite before she can return # [phone].    Row Name 07/11/18 1100       Plan    Final Discharge Disposition Code 04 - intermediate care facility        Destination     No service coordination in this encounter.      Durable Medical Equipment     No service coordination in this encounter.      Dialysis/Infusion     No service coordination in this encounter.      Home Medical Care     No service coordination in this encounter.      Social Care     No service coordination in this encounter.                Demographic Summary    No documentation.           Functional Status     Row Name 07/11/18 1100       Functional Status    Usual Activity Tolerance fair       Functional Status, IADL    Medications assistive person    Meal  Preparation completely dependent    Housekeeping completely dependent    Laundry completely dependent    Shopping completely dependent            Psychosocial    No documentation.           Abuse/Neglect    No documentation.           Legal    No documentation.           Substance Abuse    No documentation.           Patient Forms    No documentation.         Kati Martin RN

## 2018-07-11 NOTE — PROGRESS NOTES
Clinical Nutrition   Reason For Visit: Identified at risk by screening criteria Unsure weight loss    Patient Name: Arabella Gomes  YOB: 1927  MRN: [id number]  Date of Encounter: 07/11/18 12:04 PM  Admission date:        Nutrition Assessment     Hospital Problem List  Principal Problem:    Sepsis (CMS/HCC)  Active Problems:    Alzheimer's disease    Acute kidney injury (CMS/HCC)    CKD (chronic kidney disease) stage 3, GFR 30-59 ml/min    Acute cystitis with hematuria    Right lower lobe pneumonia (CMS/HCC)    Fever    Hypotension          PMH: She  has a past medical history of Anemia of chronic disease; Anxiety; CKD (chronic kidney disease) stage 3, GFR 30-59 ml/min; Dementia; and LBBB (left bundle branch block).   PSxH: She  has a past surgical history that includes No past surgeries.           Applicable medical tests/procedures since admission:Per SLP-    07/11/18 0915   Coping/Psychosocial   Plan of Care Reviewed With patient;sibling   SLP evaluation completed. Will address suspected pharyngeal dysphagia w/ diet tolerance and adjustments as needed. Pt's brother was present and did not wish to pursue a swallow study or any tube feeding, but approved a modified diet if needed. Pt demonstrating s/s of aspiration w/ thins and nectar via straw. RECS: Nectar-thick liquids, Level 4-mech soft (no mixed), no straws, small bites/sips, meds crushed in pureed, Oral care BID and PRN, general aspiration precautions, F/u for DT. Please see note for further details and recommendations.    Per wound care 7/11/2018   EDUARDO order per S r/t immobility, Jose Angel of 11, and high risk for skin breakdown. Thanks         Anthropometrics   Height: 162.6 cm  Weight: 58.5 kg (estimated weight)  BMI: 22.14  BMI classification: Normal: 18.5-24.9kg/m2   UBW: Per EMR on 6/19/2018- 58.877 kgs per standing scale.    Weight change: Not significant per data in EMR     Labs reviewed   Labs reviewed: Yes    Results from last 7  days  Lab Units 07/11/18  0420 07/10/18  1223   SODIUM mmol/L 144 141   POTASSIUM mmol/L 4.0 4.7   CHLORIDE mmol/L 115* 105   CO2 mmol/L 21.0 26.0   BUN mg/dL 18 20   CREATININE mg/dL 1.25 1.58*   GLUCOSE mg/dL 78 101*   CALCIUM mg/dL 7.6* 8.6*   MAGNESIUM mg/dL  --  2.1       Results from last 7 days  Lab Units 07/11/18  0420 07/10/18  1223   WBC 10*3/mm3 4.51 5.45   ALBUMIN g/dL  --  3.72         No results found for: HGBA1C  Medications reviewed       Intake/Ouptut 24 hrs (7:00AM - 6:59 AM)     Intake & Output (last day)       07/10 0701 - 07/11 0700 07/11 0701 - 07/12 0700    I.V. (mL/kg) 1214 (20.8)     IV Piggyback 2800 100    Total Intake(mL/kg) 4014 (68.6) 100 (1.7)    Net +4014 +100          Unmeasured Urine Occurrence 4 x             Current Nutrition Prescription   PO: Diet Dysphagia; IV - Mechanical Soft No Mixed Consistencies; Nectar / Syrup Thick; No Straws    Evaluation of Received Nutrient/Fluid Intake:  Insufficient data       Nutrition Diagnosis     Problem No nutrition diagnosis at this time       Intervention   Intervention: Follow treatment progress, Care plan reviewed      Goal:   General: Nutrition support treatment  PO: Establish PO        Monitoring/Evaluation:       Monitoring/Evaluation: Per protocol, PO intake, POC/GOC  Will Continue to follow per protocol  Courtney Stout, RD  Time Spent: 20 minutes

## 2018-07-11 NOTE — PLAN OF CARE
Problem: Patient Care Overview  Goal: Plan of Care Review  Outcome: Ongoing (interventions implemented as appropriate)   07/11/18 4283   Coping/Psychosocial   Plan of Care Reviewed With patient;sibling   SLP evaluation completed. Will address suspected pharyngeal dysphagia w/ diet tolerance and adjustments as needed. Pt's brother was present and did not wish to pursue a swallow study or any tube feeding, but approved a modified diet if needed. Pt demonstrating s/s of aspiration w/ thins and nectar via straw. RECS: Nectar-thick liquids, Level 4-mech soft (no mixed), no straws, small bites/sips, meds crushed in pureed, Oral care BID and PRN, general aspiration precautions, F/u for DT. Please see note for further details and recommendations.

## 2018-07-12 LAB
ANION GAP SERPL CALCULATED.3IONS-SCNC: 9 MMOL/L (ref 3–11)
BUN BLD-MCNC: 13 MG/DL (ref 9–23)
BUN/CREAT SERPL: 12.3 (ref 7–25)
CALCIUM SPEC-SCNC: 7.7 MG/DL (ref 8.7–10.4)
CHLORIDE SERPL-SCNC: 116 MMOL/L (ref 99–109)
CO2 SERPL-SCNC: 19 MMOL/L (ref 20–31)
CREAT BLD-MCNC: 1.06 MG/DL (ref 0.6–1.3)
DEPRECATED RDW RBC AUTO: 71.7 FL (ref 37–54)
ERYTHROCYTE [DISTWIDTH] IN BLOOD BY AUTOMATED COUNT: 17.9 % (ref 11.3–14.5)
GFR SERPL CREATININE-BSD FRML MDRD: 49 ML/MIN/1.73
GLUCOSE BLD-MCNC: 66 MG/DL (ref 70–100)
HCT VFR BLD AUTO: 24.4 % (ref 34.5–44)
HGB BLD-MCNC: 7.9 G/DL (ref 11.5–15.5)
MCH RBC QN AUTO: 35.7 PG (ref 27–31)
MCHC RBC AUTO-ENTMCNC: 32.4 G/DL (ref 32–36)
MCV RBC AUTO: 110.4 FL (ref 80–99)
PLATELET # BLD AUTO: 107 10*3/MM3 (ref 150–450)
PMV BLD AUTO: 12.6 FL (ref 6–12)
POTASSIUM BLD-SCNC: 3.4 MMOL/L (ref 3.5–5.5)
RBC # BLD AUTO: 2.21 10*6/MM3 (ref 3.89–5.14)
SODIUM BLD-SCNC: 144 MMOL/L (ref 132–146)
WBC NRBC COR # BLD: 5.33 10*3/MM3 (ref 3.5–10.8)

## 2018-07-12 PROCEDURE — 99233 SBSQ HOSP IP/OBS HIGH 50: CPT | Performed by: HOSPITALIST

## 2018-07-12 PROCEDURE — 25010000002 HEPARIN (PORCINE) PER 1000 UNITS: Performed by: PHYSICIAN ASSISTANT

## 2018-07-12 PROCEDURE — 97162 PT EVAL MOD COMPLEX 30 MIN: CPT

## 2018-07-12 PROCEDURE — 25010000002 PIPERACILLIN SOD-TAZOBACTAM PER 1 G: Performed by: PHYSICIAN ASSISTANT

## 2018-07-12 PROCEDURE — 80048 BASIC METABOLIC PNL TOTAL CA: CPT | Performed by: HOSPITALIST

## 2018-07-12 PROCEDURE — 92526 ORAL FUNCTION THERAPY: CPT

## 2018-07-12 PROCEDURE — 85027 COMPLETE CBC AUTOMATED: CPT | Performed by: HOSPITALIST

## 2018-07-12 RX ADMIN — TAZOBACTAM SODIUM AND PIPERACILLIN SODIUM 4.5 G: 500; 4 INJECTION, SOLUTION INTRAVENOUS at 12:06

## 2018-07-12 RX ADMIN — TAZOBACTAM SODIUM AND PIPERACILLIN SODIUM 4.5 G: 500; 4 INJECTION, SOLUTION INTRAVENOUS at 03:44

## 2018-07-12 RX ADMIN — HEPARIN SODIUM 5000 UNITS: 5000 INJECTION, SOLUTION INTRAVENOUS; SUBCUTANEOUS at 21:06

## 2018-07-12 RX ADMIN — HEPARIN SODIUM 5000 UNITS: 5000 INJECTION, SOLUTION INTRAVENOUS; SUBCUTANEOUS at 08:16

## 2018-07-12 RX ADMIN — TAZOBACTAM SODIUM AND PIPERACILLIN SODIUM 4.5 G: 500; 4 INJECTION, SOLUTION INTRAVENOUS at 21:06

## 2018-07-12 RX ADMIN — POTASSIUM CHLORIDE 40 MEQ: 1.5 POWDER, FOR SOLUTION ORAL at 08:16

## 2018-07-12 RX ADMIN — DOCUSATE SODIUM 100 MG: 100 CAPSULE, LIQUID FILLED ORAL at 21:06

## 2018-07-12 NOTE — PLAN OF CARE
Problem: Fall Risk (Adult)  Goal: Identify Related Risk Factors and Signs and Symptoms  Outcome: Outcome(s) achieved Date Met: 07/12/18    Goal: Absence of Fall  Outcome: Ongoing (interventions implemented as appropriate)      Problem: Skin Injury Risk (Adult)  Goal: Identify Related Risk Factors and Signs and Symptoms  Outcome: Outcome(s) achieved Date Met: 07/12/18    Goal: Skin Health and Integrity  Outcome: Ongoing (interventions implemented as appropriate)      Problem: Patient Care Overview  Goal: Plan of Care Review  Outcome: Ongoing (interventions implemented as appropriate)   07/11/18 2015 07/12/18 0521   Coping/Psychosocial   Plan of Care Reviewed With patient --    Plan of Care Review   Progress --  no change   OTHER   Outcome Summary --  VSS, arouses to voice, rested well throughout shift, weight shift assistance and incontinence care provided, no other issues/concerns   Coping/Psychosocial   Patient Agreement with Plan of Care --  unable to participate     Goal: Discharge Needs Assessment  Outcome: Ongoing (interventions implemented as appropriate)      Problem: Infection, Risk/Actual (Adult)  Goal: Identify Related Risk Factors and Signs and Symptoms  Outcome: Outcome(s) achieved Date Met: 07/12/18    Goal: Infection Prevention/Resolution  Outcome: Ongoing (interventions implemented as appropriate)

## 2018-07-12 NOTE — PLAN OF CARE
Problem: Patient Care Overview  Goal: Plan of Care Review  Outcome: Ongoing (interventions implemented as appropriate)   07/12/18 3108   Coping/Psychosocial   Plan of Care Reviewed With patient;family   SLP treatment completed. Appears to be tolerating mech-soft and nectar-thick liquids though clearly some risk for aspiration. However, given (appropriate) wishes for no MBS or feeding tubes for pt, this remains safest diet for pt. Will re-assess for ? Advancement to thin liquids if/as strength improves. If does not, can revisit wishes for safest vs comfort as appropriate (though currently showing no aversion to the nectar-thick liquids). Please see note for further details and recommendations.

## 2018-07-12 NOTE — THERAPY EVALUATION
Acute Care - Physical Therapy Initial Evaluation  [facility]     Patient Name: Arabella Gomes  : 1927  MRN: [id number]  Today's Date: 2018   Onset of Illness/Injury or Date of Surgery: 07/10/18  Date of Referral to PT: 18  Referring Physician: Fabián MOORE MD      Admit Date: 7/10/2018    Visit Dx:     ICD-10-CM ICD-9-CM   1. Altered mental status, unspecified altered mental status type R41.82 780.97   2. Sepsis, due to unspecified organism (CMS/HCC) A41.9 038.9     995.91   3. Urinary tract infection with hematuria, site unspecified N39.0 599.0    R31.9    4. Renal insufficiency N28.9 593.9   5. Anemia, unspecified type D64.9 285.9   6. Dysphagia, unspecified type R13.10 787.20   7. Impaired functional mobility, balance, gait, and endurance Z74.09 V49.89     Patient Active Problem List   Diagnosis   • Alzheimer's disease   • Low back pain   • Acute kidney injury (CMS/HCC)   • Chronic Macrocytic anemia   • Failure to thrive in adult, likely due to moderate-severe dementia   • Dysphagia   • Moderate protein-calorie malnutrition (CMS/HCC)   • CKD (chronic kidney disease) stage 3, GFR 30-59 ml/min   • Sepsis (CMS/HCC)   • Acute cystitis with hematuria   • Right lower lobe pneumonia (CMS/HCC)   • Fever   • Hypotension     Past Medical History:   Diagnosis Date   • Anemia of chronic disease    • Anxiety    • CKD (chronic kidney disease) stage 3, GFR 30-59 ml/min    • Dementia    • LBBB (left bundle branch block)      Past Surgical History:   Procedure Laterality Date   • NO PAST SURGERIES          PT ASSESSMENT (last 12 hours)      Physical Therapy Evaluation     Row Name 18 1348          PT Evaluation Time/Intention    Subjective Information no complaints  -KR     Document Type evaluation  -KR     Mode of Treatment physical therapy  -KR     Patient Effort fair  -KR     Symptoms Noted During/After Treatment fatigue  -KR     Row Name 18 3755          General Information    Patient Profile  Reviewed? yes  -KR     Onset of Illness/Injury or Date of Surgery 07/10/18  -KR     Referring Physician Fabián MOORE MD  -KR     Patient Observations agree to therapy;lethargic  -KR     Prior Level of Function --   pt is poor historian; TBA later  -KR     Equipment Currently Used at Home --   pt is poor historian; TBA later  -KR     Pertinent History of Current Functional Problem Pt with [facility] for Alzheimer's dementia. Pt is a resident of the memory care unit at [facility]. Recently admitted to [facility] 6/16-6/20 due to concerns for pnuemonia. Pt returned to ED on 7/10/18 for evaluation of altered mental status and increasing lethargy.   -KR     Existing Precautions/Restrictions fall;other (see comments)   Alzheimer's dementia  -KR     Risks Reviewed patient:;LOB;dizziness;increased discomfort;change in vital signs  -KR     Benefits Reviewed patient:;improve function;increase independence;increase strength;increase balance  -KR     Barriers to Rehab medically complex;previous functional deficit;cognitive status  -KR     Row Name 07/12/18 1348          Relationship/Environment    Lives With facility resident  -KR     Row Name 07/12/18 1348          Resource/Environmental Concerns    Current Living Arrangements extended care facility  -KR     Row Name 07/12/18 1348          Cognitive Assessment/Interventions    Additional Documentation Cognitive Assessment/Intervention (Group)  -KR     Row Name 07/12/18 1348          Cognitive Assessment/Intervention- PT/OT    Affect/Mental Status (Cognitive) confused;low arousal/lethargic  -KR     Orientation Status (Cognition) oriented to;person   name only  -KR     Follows Commands (Cognition) follows one step commands;0-24% accuracy;repetition of directions required;verbal cues/prompting required  -KR     Cognitive Function (Cognitive) attention deficit;memory deficit;safety deficit  -KR     Attention Deficit (Cognitive) severe deficit;focused/sustained attention  -KR     Memory  Deficit (Cognitive) severe deficit;short term memory;long term memory  -KR     Safety Deficit (Cognitive) severe deficit;ability to follow commands;awareness of need for assistance;insight into deficits/self awareness;safety precautions awareness;safety precautions follow-through/compliance  -KR     Personal Safety Interventions fall prevention program maintained;gait belt;nonskid shoes/slippers when out of bed  -KR     Row Name 07/12/18 8732          Safety Issues, Functional Mobility    Safety Issues Affecting Function (Mobility) ability to follow commands;awareness of need for assistance;insight into deficits/self awareness;positioning of assistive device;safety precaution awareness;safety precautions follow-through/compliance  -KR     Impairments Affecting Function (Mobility) balance;cognition;coordination;endurance/activity tolerance;strength  -KR     Row Name 07/12/18 4554          Bed Mobility Assessment/Treatment    Bed Mobility Assessment/Treatment rolling left;rolling right;supine-sit;sit-supine  -KR     Rolling Left [place] (Bed Mobility) dependent (less than 25% patient effort);2 person assist;verbal cues  -KR     Rolling Right [place] (Bed Mobility) dependent (less than 25% patient effort);2 person assist;verbal cues  -KR     Supine-Sit [place] (Bed Mobility) dependent (less than 25% patient effort);2 person assist;verbal cues  -KR     Sit-Supine [place] (Bed Mobility) dependent (less than 25% patient effort);2 person assist;verbal cues  -KR     Bed Mobility, Safety Issues cognitive deficits limit understanding;decreased use of arms for pushing/pulling;decreased use of legs for bridging/pushing  -KR     Assistive Device (Bed Mobility) draw sheet;head of bed elevated  -KR     Comment (Bed Mobility) VC's for sequencing. Pt with increased lethargy; unable to follow commands or assist with mobility.   -KR     Row Name 07/12/18 8063          Transfer Assessment/Treatment    Comment  (Transfers) Transfers deferred. Not appropriate to assess.   -KARON Becerra Name 07/12/18 1348          Gait/Stairs Assessment/Training    [place] Level (Gait) unable to assess  -KR     Comment (Gait/Stairs) Ambulation deferred. Not appropriate to assess.   -KARON Becerra Name 07/12/18 1348          General ROM    GENERAL ROM COMMENTS BLE WFL.  -KARON Becerra Name 07/12/18 1348          MMT (Manual Muscle Testing)    Additional Documentation General Assessment (Manual Muscle Testing) (Group)  -KARON Becerra Name 07/12/18 1348          General Assessment (Manual Muscle Testing)    General Manual Muscle Testing (MMT) Assessment lower extremity strength deficits identified  -     Comment, General Manual Muscle Testing (MMT) Assessment unable to formally assess due to cognitive status  -KARON Becerra Name 07/12/18 1348          Motor Assessment/Intervention    Additional Documentation Balance (Group)  -KARON Becerra Name 07/12/18 1348          Balance    Balance static sitting balance  -KARON Becerra Name 07/12/18 1348          Static Sitting Balance    Level of [place] (Unsupported Sitting, Static Balance) maximal assist, 25 to 49% patient effort  -     Sitting Position (Unsupported Sitting, Static Balance) sitting on edge of bed  -KARON Becerra Name 07/12/18 1348          Sensory Assessment/Intervention    Sensory General Assessment --   unable to asess  -KARON Becerra Name 07/12/18 1348          Pain Assessment    Additional Documentation Pain Scale: Numbers Pre/Post-Treatment (Group)  -KARON Becerra Name 07/12/18 1348          Pain Scale: Numbers Pre/Post-Treatment    Pain Scale: Numbers, Pretreatment 0/10 - no pain  -     Pain Scale: Numbers, Post-Treatment 0/10 - no pain  -KARON Becerra Name 07/12/18 1348          Plan of Care Review    Plan of Care Reviewed With patient  -KARON Becerra Name 07/12/18 1348          Physical Therapy Clinical Impression    Date of Referral to PT 07/12/18  -     PT Diagnosis (PT Clinical Impression)  impaired functional mobility  -KR     Patient/Family Goals Statement (PT Clinical Impression) return to PLOF  -KR     Criteria for Skilled Interventions Met (PT Clinical Impression) yes;treatment indicated  -KR     Rehab Potential (PT Clinical Summary) fair, will monitor progress closely  -KR     Care Plan Review (PT) evaluation/treatment results reviewed;care plan/treatment goals reviewed;patient/other agree to care plan  -KR     Row Name 07/12/18 1348          Vital Signs    Pre Systolic BP Rehab 145  -KR     Pre Treatment Diastolic BP 81  -KR     Pretreatment Heart Rate (beats/min) 80  -KR     Posttreatment Heart Rate (beats/min) 103  -KR     Pre SpO2 (%) 95  -KR     O2 Delivery Pre Treatment room air  -KR     Post SpO2 (%) 93  -KR     O2 Delivery Post Treatment room air  -KR     Pre Patient Position Supine  -KR     Intra Patient Position Sitting  -KR     Post Patient Position Supine  -KR     Row Name 07/12/18 1348          Physical Therapy Goals    Bed Mobility Goal Selection (PT) bed mobility, PT goal 1  -KR     Transfer Goal Selection (PT) transfer, PT goal 1  -KR     Balance Goal Selection (PT) balance, PT goal 1  -KR     Additional Documentation Balance Goal Selection (PT) (Row)  -KR     Row Name 07/12/18 1348          Bed Mobility Goal 1 (PT)    Activity/Assistive Device (Bed Mobility Goal 1, PT) sit to supine;supine to sit  -KR     [place] Level/Cues Needed (Bed Mobility Goal 1, PT) moderate assist (50-74% patient effort)  -KR     Time Frame (Bed Mobility Goal 1, PT) 2 weeks  -KR     Progress/Outcomes (Bed Mobility Goal 1, PT) goal ongoing  -KR     Row Name 07/12/18 134          Transfer Goal 1 (PT)    Activity/Assistive Device (Transfer Goal 1, PT) sit-to-stand/stand-to-sit;walker, rolling  -KR     [place] Level/Cues Needed (Transfer Goal 1, PT) maximum assist (25-49% patient effort)  -KR     Time Frame (Transfer Goal 1, PT) 2 weeks  -KR     Progress/Outcome (Transfer Goal 1, PT) goal ongoing   -KR     Row Name 07/12/18 1348          Balance Goal 1 (PT)    Activity/Assistive Device (Balance Goal 1, PT) sitting, static  -KR     [place] Level/Cues Needed (Balance Goal 1, PT) minimum assist (75% or more patient effort)  -KR     Time Frame (Balance Goal 1, PT) 2 weeks  -KR     Progress/Outcomes (Balance Goal 1, PT) goal ongoing  -KR     Row Name 07/12/18 1348          Positioning and Restraints    Pre-Treatment Position in bed  -KR     Post Treatment Position bed  -KR     In Bed notified nsg;supine;call light within reach;encouraged to call for assist;exit alarm on;side rails up x3  -KR     Row Name 07/12/18 1348          Living Environment    Home Accessibility other (see comments)   no concerns  -KR       User Key  (r) = Recorded By, (t) = Taken By, (c) = Cosigned By    Initials Name Provider Type    KARON Mchugh PT Physical Therapist          Physical Therapy Education     Title: PT OT SLP Therapies (Active)     Topic: Physical Therapy (Active)     Point: Body mechanics (Active)    Learning Progress Summary     Learner Status Readiness Method Response Comment Documented by    Patient Active Acceptance E NR  KR 07/12/18 1542          Point: Precautions (Active)    Learning Progress Summary     Learner Status Readiness Method Response Comment Documented by    Patient Active Acceptance E NR  KR 07/12/18 1542                      User Key     Initials Effective Dates Name Provider Type Discipline     04/03/18 -  Kaylee Mchugh, PT Physical Therapist PT                PT Recommendation and Plan  Anticipated Discharge Disposition (PT): extended care facility  Planned Therapy Interventions (PT Eval): balance training, bed mobility training, strengthening, transfer training  Therapy Frequency (PT Clinical Impression): 3 times/wk  Outcome Summary/Treatment Plan (PT)  Anticipated Discharge Disposition (PT): extended care facility  Plan of Care Reviewed With: patient  Outcome Summary: PT initial  evaluation completed for pt presenting with increased confusion, lethargy, and generalized weakness. Pt required depA x2 for all bed mobility. Pt's decreased independence warrants PT skilled care. Recommend return to extended care facility upon discharge.           Outcome Measures     Row Name 07/12/18 1348             How much help from another person do you currently need...    Turning from your back to your side while in flat bed without using bedrails? 1  -KR      Moving from lying on back to sitting on the side of a flat bed without bedrails? 1  -KR      Moving to and from a bed to a chair (including a wheelchair)? 1  -KR      Standing up from a chair using your arms (e.g., wheelchair, bedside chair)? 1  -KR      Climbing 3-5 steps with a railing? 1  -KR      To walk in hospital room? 1  -KR      AM-PAC 6 Clicks Score 6  -KR         Functional Assessment    Outcome Measure Options AM-PAC 6 Clicks Basic Mobility (PT)  -KR        User Key  (r) = Recorded By, (t) = Taken By, (c) = Cosigned By    Initials Name Provider Type    KARON Mchugh PT Physical Therapist           Time Calculation:         PT Charges     Row Name 07/12/18 1348             Time Calculation    Start Time 1348  -KR      PT Received On 07/12/18  -KR      PT Goal Re-Cert Due Date 07/22/18  -KR        User Key  (r) = Recorded By, (t) = Taken By, (c) = Cosigned By    Initials Name Provider Type    KARON Mchugh PT Physical Therapist        Therapy Suggested Charges     Code   Minutes Charges    None           Therapy Charges for Today     Code Description Service Date Service Provider Modifiers Qty    47592610891 HC PT EVAL MOD COMPLEXITY 4 7/12/2018 Kaylee Mchugh, PT GP 1    18181309800 HC PT THER SUPP EA 15 MIN 7/12/2018 Kaylee Mchugh, PT GP 2          PT G-Codes  Outcome Measure Options: AM-PAC 6 Clicks Basic Mobility (PT)      Fabiola Mchugh PT  7/12/2018

## 2018-07-12 NOTE — PROGRESS NOTES
[facility] Medicine Services  PROGRESS NOTE    Patient Name: Arabella Gomes  : 1927  MRN: [id number]    Date of Admission: 7/10/2018  Length of Stay: 2  Primary Care Physician: No Known Provider    Subjective   Subjective     CC:  F/U AMS    HPI:  Patient seen this morning. Much more awake. Niece at bedside.     Review of Systems  Denies dyspnea or cough  No pain  Limited due to dementia    Otherwise ROS is negative except as mentioned in the HPI.    Objective   Objective     Vital Signs:   Temp:  [98.3 °F (36.8 °C)-100.1 °F (37.8 °C)] 98.3 °F (36.8 °C)  Heart Rate:  [] 112  Resp:  [16-18] 18  BP: (143-145)/(77-81) 145/81        Physical Exam:  Gen-no acute distress  CV-RRR, S1 S2 normal, no m/r/g  Resp-CTAB, no wheezes  Abd-soft, NT, [place], +BS  Ext-no edema  Neuro-awake, baseline confusion, no focal deficits  Psych-appropriate mood      Results Reviewed:  I have personally reviewed current lab, radiology, and data and agree.      Results from last 7 days  Lab Units 18  0458 18  0420 07/10/18  1223   WBC 10*3/mm3 5.33 4.51 5.45   HEMOGLOBIN g/dL 7.9* 7.5* 9.0*   HEMATOCRIT % 24.4* 23.2* 27.5*   PLATELETS 10*3/mm3 107* 110* 146*   INR   --   --  1.07       Results from last 7 days  Lab Units 18  0458 18  0420 07/10/18  1223   SODIUM mmol/L 144 144 141   POTASSIUM mmol/L 3.4* 4.0 4.7   CHLORIDE mmol/L 116* 115* 105   CO2 mmol/L 19.0* 21.0 26.0   BUN mg/dL 13 18 20   CREATININE mg/dL 1.06 1.25 1.58*   GLUCOSE mg/dL 66* 78 101*   CALCIUM mg/dL 7.7* 7.6* 8.6*   ALT (SGPT) U/L  --   --  14   AST (SGOT) U/L  --   --  25     Estimated Creatinine Clearance: 31.9 mL/min (by C-G formula based on SCr of 1.06 mg/dL).  No results found for: BNP    Microbiology Results Abnormal     Procedure Component Value - Date/Time    Blood Culture - Blood, [[id number]] Collected:  07/10/18 1240    Lab Status:  Preliminary result Specimen:  Blood from Arm, Left Updated:  18  1315     Blood Culture No growth at 2 days      Aerobic bottle only    Blood Culture - Blood, [[id number]]  (Normal) Collected:  07/10/18 1250    Lab Status:  Preliminary result Specimen:  Blood from Wrist, Left Updated:  07/12/18 1315     Blood Culture No growth at 2 days    Urine Culture - Urine, Urine, Catheter [[id number]]  (Abnormal) Collected:  07/10/18 1223    Lab Status:  Preliminary result Specimen:  Urine from Urine, Catheter Updated:  07/12/18 1106     Urine Culture 20,000-30,000 CFU/mL Yeast isolated (A)    Influenza Antigen, Rapid - Swab, Nasopharynx [[id number]]  (Normal) Collected:  07/10/18 1224    Lab Status:  Final result Specimen:  Swab from Nasopharynx Updated:  07/10/18 1247     Influenza A Ag, EIA Negative     Influenza B Ag, EIA Negative          Imaging Results (last 24 hours)     ** No results found for the last 24 hours. **             I have reviewed the medications.    Assessment/Plan   Assessment / Plan     Hospital Problem List     * (Principal)Sepsis (CMS/[facility])    Acute cystitis with hematuria    Right lower lobe pneumonia (CMS/[facility])    Fever    Hypotension    Alzheimer's disease (Chronic)    Acute kidney injury (CMS/[facility])    CKD (chronic kidney disease) stage 3, GFR 30-59 ml/min             Brief Hospital Course to date:  Arabella Gomes is a 89 y.o. female with PMH significant for Alzheimer's dementia and CKD III. Admitted to [facility] 6/16-6/20/18 with confusion, fevers/chills and cough. Treated for pneumonia. Respiratory PCR (+) rhinovirus. Discharged on Doxycycline. She returned to [facility] ED 7/10/18 with AMS. Evaluation revealed sepsis secondary to UTI and RLL pneumonia.       Assessment & Plan:  --Blood cultures NGTD. Stop Vanc today and continue Zosyn. Urine culture with yeast only.  --KARIN resolved. Stop fluids.   --H&H down but likely dilutional related to fluid resuscitation. Better today. Repeat labs in AM.  --PT/OT consult.   --Anticipate back to memory care unit over the weekend.      DVT  Prophylaxis:  SQH    CODE STATUS:   Code Status and Medical Interventions:   Ordered at: 07/10/18 1457     Limited Support to NOT Include:    Artificial Nutrition     Code Status:    No CPR     Medical Interventions (Level of Support Prior to Arrest):    Limited     Comments:    Living will reviewed       Disposition: I expect the patient to be discharged ~2 days      Electronically signed by Sarah Lockwood MD, 07/12/18, 3:02 PM.

## 2018-07-12 NOTE — PROGRESS NOTES
Continued Stay Note  [facility]     Patient Name: Arabella Gomes  MRN: [id number]  Today's Date: 7/12/2018    Admit Date: 7/10/2018          Discharge Plan     Row Name 07/12/18 4807       Plan    Plan Memory Care    Patient/Family in Agreement with Plan yes    Plan Comments I spoke with the pts kacey in the room. The goal is back to memory care at [facility]. PT/OT have been ordered. I have spoken with the DON Ellen on her cell [phone]. She will need to see the pt on site to see if she can return. The pt will need to be assist of one and no skilled needs to return. The pt can have HH thru [place]. The facility will need a summary and scripts for ANY new medications at [facility]. The DON states the pt has been very sick and they want to make sure she is strong enough to return at [facility]. The niece states the pt uses van transport but this is not available on the [facility].I will f/u in am.              Discharge Codes    No documentation.       Expected Discharge Date and Time     Expected Discharge Date Expected Discharge Time    Jul 16, 2018             Kati Martin RN

## 2018-07-12 NOTE — PLAN OF CARE
Problem: Patient Care Overview  Goal: Plan of Care Review  Outcome: Ongoing (interventions implemented as appropriate)   07/12/18 2401   Coping/Psychosocial   Plan of Care Reviewed With patient   OTHER   Outcome Summary PT initial evaluation completed for pt presenting with increased confusion, lethargy, and generalized weakness. Pt required depA x2 for all bed mobility. Pt's decreased independence warrants PT skilled care. Recommend return to extended care facility upon discharge.

## 2018-07-12 NOTE — THERAPY TREATMENT NOTE
Acute Care - Speech Language Pathology   Swallow Treatment Note [facility]     Patient Name: Arabella Gomes  : 1927  MRN: [id number]  Today's Date: 2018               Admit Date: 7/10/2018    Visit Dx:      ICD-10-CM ICD-9-CM   1. Altered mental status, unspecified altered mental status type R41.82 780.97   2. Sepsis, due to unspecified organism (CMS/HCC) A41.9 038.9     995.91   3. Urinary tract infection with hematuria, site unspecified N39.0 599.0    R31.9    4. Renal insufficiency N28.9 593.9   5. Anemia, unspecified type D64.9 285.9   6. Dysphagia, unspecified type R13.10 787.20     Patient Active Problem List   Diagnosis   • Alzheimer's disease   • Low back pain   • Acute kidney injury (CMS/HCC)   • Chronic Macrocytic anemia   • Failure to thrive in adult, likely due to moderate-severe dementia   • Dysphagia   • Moderate protein-calorie malnutrition (CMS/HCC)   • CKD (chronic kidney disease) stage 3, GFR 30-59 ml/min   • Sepsis (CMS/HCC)   • Acute cystitis with hematuria   • Right lower lobe pneumonia (CMS/HCC)   • Fever   • Hypotension       Therapy Treatment    Therapy Treatment / Health Promotion    Treatment Time/Intention  Discipline: speech language pathologist (18 1020 : Magdalena Monreal MS CCC-SLP)  Document Type: therapy note (daily note) (18 1020 : MS LESLEY Ortiz)  Subjective Information: no complaints (18 1020 : MS LESLEY Ortiz)  Patient/Family Observations: Family present (18 1020 : MS LESLEY Ortiz)  Care Plan Review: care plan/treatment goals reviewed (18 1020 : MS LESLEY Ortiz)  Patient Effort: adequate (18 1020 : MS LESLEY Ortiz)  Plan of Care Review  Plan of Care Reviewed With: patient, family (18 1408 : Magdalena M Moynahan, MS CCC-SLP)    Vitals/Pain/Safety  Pain Scale: FACES Pre/Post-Treatment  Pain: FACES Scale, Pretreatment: 2-->hurts little bit  (07/12/18 1020 : Magdalena Monreal, MS CCC-SLP)  Pain: FACES Scale, Post-Treatment: 2-->hurts little bit (07/12/18 1020 : Magdalena Monreal, MS CCC-SLP)    Cognition, Communication, Swallow       Outcome Summary  Outcome Summary/Treatment Plan (SLP)  Daily Summary of Progress (SLP): progress toward functional goals as expected (07/12/18 1020 : Magdalena Monreal, MS CCC-SLP)  Plan for Continued Treatment (SLP): Will continue to follow for dysphagia (07/12/18 1020 : Magdalena Monreal, MS CCC-SLP)            SLP GOALS     Row Name 07/12/18 1020 07/11/18 0915          Oral Nutrition/Hydration Goal 1 (SLP)    Oral Nutrition/Hydration Goal 1, SLP LTG: Pt will tolerate soft whole foods and thin liquids w/ 100% accuracy w/ min cues.   -SM LTG: Pt will tolerate soft whole foods and thin liquids w/ 100% accuracy w/ min cues.   -RD     Time Frame (Oral Nutrition/Hydration Goal 1, SLP) by discharge  -SM by discharge  -RD     Progress/Outcomes (Oral Nutrition/Hydration Goal 1, SLP) continuing progress toward goal  -SM  --        Swallow Management Recall Goal (SLP)    Swallow Management Recall Goal (SLP) Pt will tolerate Nectar-thick and level 4 dys diet (no straws) w/ no overt s/s of aspiration or complications w/ 100% accuracy w/ min cues and assist  -SM Pt will tolerate Nectar-thick and level 4 dys diet (no straws) w/ no overt s/s of aspiration or complications w/ 100% accuracy w/ min cues and assist  -RD     Time Frame (Swallow Management Recall Goal, SLP) short term goal (STG);by discharge  -SM short term goal (STG);by discharge  -RD     Barriers (Swallow Management Recall Goal, SLP) No aversion. Delayed cough x1 during trials. Remains at least some risk for aspiration. But given wishes for no MBS or TFs, this remains safest clinical option for pt.   -SM  --        Swallow Compensatory Strategies Goal (SLP)    Swallow Compensatory Strategies/Techniques Goal (SLP) Pt will tolerate trials of thin liquids w/ no overt  s/s of aspiration and complications w/ 80% accuracy w/ min cues.   -SM Pt will tolerate trials of thin liquids w/ no overt s/s of aspiration and complications w/ 80% accuracy w/ min cues.   -RD     Time Frame (Swallow Compensatory Strategies/Techniques Goal, SLP) short term goal (STG);by discharge  - short term goal (STG);by discharge  -RD     Barriers (Swallow Compensatory Strategies/Techniques Goal, SLP) Did not target 2' sleepy - focus was on tolerance of thickened liquids  -  --     Progress/Outcomes (Swallow Compensatory Strategies/Techniques Goal, SLP) continuing progress toward goal  -  --       User Key  (r) = Recorded By, (t) = Taken By, (c) = Cosigned By    Initials Name Provider Type    FELIPE Monreal MS CCC-SLP Speech and Language Pathologist    SUMMER Moran MS CCC-SLP Speech and Language Pathologist          EDUCATION  The patient's family has been educated in the following areas:   Dysphagia (Swallowing Impairment) Modified Diet Instruction.    SLP Recommendation and Plan                                        Plan of Care Reviewed With: patient, family  Plan of Care Review  Plan of Care Reviewed With: patient, family  Daily Summary of Progress (SLP): progress toward functional goals as expected  Plan for Continued Treatment (SLP): Will continue to follow for dysphagia           Time Calculation:         Time Calculation- SLP     Row Name 07/12/18 1411             Time Calculation- SLP    SLP Start Time 1020  -      SLP Received On 07/12/18  -        User Key  (r) = Recorded By, (t) = Taken By, (c) = Cosigned By    Initials Name Provider Type    FELIPE Monreal MS CCC-SLP Speech and Language Pathologist          Therapy Charges for Today     Code Description Service Date Service Provider Modifiers Qty    [id number] [facility] TREATMENT SWALLOW 2 7/12/2018 Magdalena Monreal MS CCC-SLP GN 1                 MS LESELY Pappas  7/12/2018

## 2018-07-13 LAB
ANION GAP SERPL CALCULATED.3IONS-SCNC: 8 MMOL/L (ref 3–11)
BACTERIA SPEC AEROBE CULT: ABNORMAL
BUN BLD-MCNC: 10 MG/DL (ref 9–23)
BUN/CREAT SERPL: 9.7 (ref 7–25)
CALCIUM SPEC-SCNC: 7.7 MG/DL (ref 8.7–10.4)
CHLORIDE SERPL-SCNC: 113 MMOL/L (ref 99–109)
CO2 SERPL-SCNC: 24 MMOL/L (ref 20–31)
CREAT BLD-MCNC: 1.03 MG/DL (ref 0.6–1.3)
DEPRECATED RDW RBC AUTO: 65.8 FL (ref 37–54)
ERYTHROCYTE [DISTWIDTH] IN BLOOD BY AUTOMATED COUNT: 17.3 % (ref 11.3–14.5)
GFR SERPL CREATININE-BSD FRML MDRD: 50 ML/MIN/1.73
GLUCOSE BLD-MCNC: 90 MG/DL (ref 70–100)
HCT VFR BLD AUTO: 24.9 % (ref 34.5–44)
HGB BLD-MCNC: 8.3 G/DL (ref 11.5–15.5)
MCH RBC QN AUTO: 35.5 PG (ref 27–31)
MCHC RBC AUTO-ENTMCNC: 33.3 G/DL (ref 32–36)
MCV RBC AUTO: 106.4 FL (ref 80–99)
PLATELET # BLD AUTO: 120 10*3/MM3 (ref 150–450)
PMV BLD AUTO: 12.9 FL (ref 6–12)
POTASSIUM BLD-SCNC: 3.9 MMOL/L (ref 3.5–5.5)
RBC # BLD AUTO: 2.34 10*6/MM3 (ref 3.89–5.14)
SODIUM BLD-SCNC: 145 MMOL/L (ref 132–146)
WBC NRBC COR # BLD: 5.49 10*3/MM3 (ref 3.5–10.8)

## 2018-07-13 PROCEDURE — 85027 COMPLETE CBC AUTOMATED: CPT | Performed by: HOSPITALIST

## 2018-07-13 PROCEDURE — 92526 ORAL FUNCTION THERAPY: CPT

## 2018-07-13 PROCEDURE — 97166 OT EVAL MOD COMPLEX 45 MIN: CPT

## 2018-07-13 PROCEDURE — 80048 BASIC METABOLIC PNL TOTAL CA: CPT | Performed by: HOSPITALIST

## 2018-07-13 PROCEDURE — 25010000002 HEPARIN (PORCINE) PER 1000 UNITS: Performed by: PHYSICIAN ASSISTANT

## 2018-07-13 PROCEDURE — 99233 SBSQ HOSP IP/OBS HIGH 50: CPT | Performed by: HOSPITALIST

## 2018-07-13 PROCEDURE — 25010000002 PIPERACILLIN SOD-TAZOBACTAM PER 1 G: Performed by: PHYSICIAN ASSISTANT

## 2018-07-13 RX ORDER — AMOXICILLIN AND CLAVULANATE POTASSIUM 500; 125 MG/1; MG/1
1 TABLET, FILM COATED ORAL EVERY 12 HOURS SCHEDULED
Status: DISPENSED | OUTPATIENT
Start: 2018-07-13 | End: 2018-07-17

## 2018-07-13 RX ADMIN — TAZOBACTAM SODIUM AND PIPERACILLIN SODIUM 4.5 G: 500; 4 INJECTION, SOLUTION INTRAVENOUS at 03:18

## 2018-07-13 RX ADMIN — HEPARIN SODIUM 5000 UNITS: 5000 INJECTION, SOLUTION INTRAVENOUS; SUBCUTANEOUS at 10:13

## 2018-07-13 RX ADMIN — DOCUSATE SODIUM 100 MG: 100 CAPSULE, LIQUID FILLED ORAL at 21:31

## 2018-07-13 RX ADMIN — DOCUSATE SODIUM 100 MG: 100 CAPSULE, LIQUID FILLED ORAL at 10:13

## 2018-07-13 RX ADMIN — AMOXICILLIN AND CLAVULANATE POTASSIUM 500 MG: 500; 125 TABLET, FILM COATED ORAL at 13:13

## 2018-07-13 RX ADMIN — AMOXICILLIN AND CLAVULANATE POTASSIUM 500 MG: 500; 125 TABLET, FILM COATED ORAL at 21:31

## 2018-07-13 RX ADMIN — HEPARIN SODIUM 5000 UNITS: 5000 INJECTION, SOLUTION INTRAVENOUS; SUBCUTANEOUS at 21:31

## 2018-07-13 NOTE — THERAPY EVALUATION
Acute Care - Occupational Therapy Initial Evaluation  [facility]     Patient Name: Arabella Gomes  : 1927  MRN: [id number]  Today's Date: 2018  Onset of Illness/Injury or Date of Surgery: 07/10/18  Date of Referral to OT: 18  Referring Physician: MD Fabián    Admit Date: 7/10/2018       ICD-10-CM ICD-9-CM   1. Altered mental status, unspecified altered mental status type R41.82 780.97   2. Sepsis, due to unspecified organism (CMS/HCC) A41.9 038.9     995.91   3. Urinary tract infection with hematuria, site unspecified N39.0 599.0    R31.9    4. Renal insufficiency N28.9 593.9   5. Anemia, unspecified type D64.9 285.9   6. Dysphagia, unspecified type R13.10 787.20   7. Impaired functional mobility, balance, gait, and endurance Z74.09 V49.89   8. Impaired mobility and ADLs Z74.09 799.89     Patient Active Problem List   Diagnosis   • Alzheimer's disease   • Low back pain   • Acute kidney injury (CMS/HCC)   • Chronic Macrocytic anemia   • Failure to thrive in adult, likely due to moderate-severe dementia   • Dysphagia   • Moderate protein-calorie malnutrition (CMS/HCC)   • CKD (chronic kidney disease) stage 3, GFR 30-59 ml/min   • Sepsis (CMS/HCC)   • Acute cystitis with hematuria   • Right lower lobe pneumonia (CMS/HCC)   • Fever   • Hypotension     Past Medical History:   Diagnosis Date   • Anemia of chronic disease    • Anxiety    • CKD (chronic kidney disease) stage 3, GFR 30-59 ml/min    • Dementia    • LBBB (left bundle branch block)      Past Surgical History:   Procedure Laterality Date   • NO PAST SURGERIES            OT ASSESSMENT FLOWSHEET (last 72 hours)      Occupational Therapy Evaluation     Row Name 18 0835                   OT Evaluation Time/Intention    Subjective Information complains of;fatigue  -HK        Document Type evaluation  -HK        Mode of Treatment individual therapy;occupational therapy  -HK        Patient Effort fair  -HK        Symptoms Noted During/After  Treatment fatigue  -HK        Comment Pt poor historian. Pt living in memory care and according to niece   -HK           General Information    Patient Profile Reviewed? yes  -HK        Onset of Illness/Injury or Date of Surgery 07/10/18  -HK        Referring Physician MD Fabián  -HK        Patient Observations lethargic;agree to therapy  -HK        General Observations of Patient Pt received supine in bed and niece entered room during session.   -HK        Prior Level of Function --   pt poor historian   -HK        Equipment Currently Used at Home --   pt poor historian   -HK        Pertinent History of Current Functional Problem Pt is a 89 YOF with a PMH of Alzheimer's disease who presents to [facility] with complaints of AMS. Pt is a resident in memory care unit at [facility]. PT had recent admission to [facility]  6/16-6/20 for concerns of PNA.   -HK        Existing Precautions/Restrictions fall;other (see comments)   Alzheimer's Disease  -HK        Risks Reviewed patient:;nausea/vomiting;dizziness;LOB;increased discomfort;change in vital signs;increased drainage;lines disloged  -HK        Benefits Reviewed patient:;increase independence;improve function;increase strength;increase balance;decrease pain;decrease risk of DVT;improve skin integrity;increase knowledge  -HK        Barriers to Rehab medically complex;previous functional deficit;cognitive status  -HK           Relationship/Environment    Primary Source of Support/Comfort sibling(s);extended family  -HK        Lives With facility resident  -HK           Resource/Environmental Concerns    Current Living Arrangements other (see comments)   memory care facility   -HK           Cognitive Assessment/Intervention- PT/OT    Affect/Mental Status (Cognitive) confused;low arousal/lethargic  -HK        Orientation Status (Cognition) oriented to;person  -HK        Follows Commands (Cognition) follows one step commands;0-24% accuracy  -HK        Cognitive Function  (Cognitive) attention deficit;memory deficit  -HK        Attention Deficit (Cognitive) severe deficit;arousal/alertness  -HK        Memory Deficit (Cognitive) severe deficit;short term memory;long term memory  -HK           Safety Issues, Functional Mobility    Impairments Affecting Function (Mobility) balance;strength;cognition  -HK           Bed Mobility Assessment/Treatment    Bed Mobility Assessment/Treatment rolling left;rolling right;scooting/bridging;supine-sit;sit-supine  -HK        Rolling Left [place] (Bed Mobility) dependent (less than 25% patient effort);2 person assist  -HK        Rolling Right [place] (Bed Mobility) dependent (less than 25% patient effort);2 person assist  -HK        Scooting/Bridging [place] (Bed Mobility) dependent (less than 25% patient effort);2 person assist  -HK        Supine-Sit [place] (Bed Mobility) dependent (less than 25% patient effort);2 person assist  -HK        Sit-Supine [place] (Bed Mobility) dependent (less than 25% patient effort);2 person assist  -HK        Bed Mobility, Safety Issues cognitive deficits limit understanding;decreased use of arms for pushing/pulling;decreased use of legs for bridging/pushing;impaired trunk control for bed mobility  -HK        Assistive Device (Bed Mobility) draw sheet;head of bed elevated  -HK        Comment (Bed Mobility) Pt is difficult to arrouse and becomes more alert when sitting EOB and opens eyes. OT sat pt EOB x2. Klaudia entered room and requested OT attempt a second time with her there to encourage pt. There was no change in patients participation.   -HK           Functional Mobility    Functional Mobility- Ind. Level not appropriate to assess  -HK           Transfer Assessment/Treatment    Comment (Transfers) not appropriate to assess due to pts poor core strength and pt not in lift room. OT requested pt be added to list for lift room.   -HK           ADL Assessment/Intervention    BADL  Assessment/Intervention grooming;toileting;feeding  -HK           Grooming Assessment/Training    [place] Level (Grooming) wash face, hands;maximum assist (25% patient effort)  -HK        Grooming Position sitting up in bed  -HK        Comment (Grooming) OT cued pt to wash face and pt did not intiate task   -HK           Self-Feeding Assessment/Training    Comment (Feeding) pt is dependent for feeding at this time   -HK           Toileting Assessment/Training    [place] Level (Toileting) dependent (less than 25% patient effort);perform perineal hygiene  -HK        Toileting Position supine  -HK        Comment (Toileting) Pt rolled L to R for removal of soiled linens and natalie care  -HK           BADL Safety/Performance    Impairments, BADL Safety/Performance balance;strength;cognition;endurance/activity tolerance  -HK           General ROM    GENERAL ROM COMMENTS pt unable to follow directions for assessment and resistive to PROM. OT educated niece on completion of PROM to prevent joints from freezing. OT also educated niece on engaging pt in AROM when pt would follow directions.   -HK           General Assessment (Manual Muscle Testing)    General Manual Muscle Testing (MMT) Assessment upper extremity strength deficits identified  -HK        Comment, General Manual Muscle Testing (MMT) Assessment unable to formally assess due to pts difficulty following commands. Expect BUE 2+/5  -HK           Motor Assessment/Interventions    Additional Documentation Balance (Group)  -HK           Balance    Balance static sitting balance  -HK           Static Sitting Balance    Level of [place] (Unsupported Sitting, Static Balance) maximal assist, 25 to 49% patient effort  -HK        Sitting Position (Unsupported Sitting, Static Balance) sitting on edge of bed  -HK        Time Able to Maintain Position (Unsupported Sitting, Static Balance) 1 to 2 minutes  -HK        Comment (Unsupported Sitting, Static Balance) pt  had periods of modA   -HK           Sensory Assessment/Intervention    Sensory General Assessment no sensation deficits identified  -HK           Positioning and Restraints    Pre-Treatment Position in bed  -HK        Post Treatment Position bed  -HK        In Bed notified nsg;supine;call light within reach;exit alarm on;encouraged to call for assist;with family/caregiver  -HK           Pain Assessment    Additional Documentation Pain Scale: FACES Pre/Post-Treatment (Group)  -HK           Pain Scale: FACES Pre/Post-Treatment    Pain: FACES Scale, Pretreatment 0-->no hurt  -HK        Pain: FACES Scale, Post-Treatment 0-->no hurt  -HK           Plan of Care Review    Plan of Care Reviewed With patient  -HK           Clinical Impression (OT)    Date of Referral to OT 07/12/18  -HK        OT Diagnosis Decreased independence with ADLS and Mobility   -HK        Patient/Family Goals Statement (OT Eval) Pts family would like her to improve and return to memory care   -HK        Criteria for Skilled Therapeutic Interventions Met (OT Eval) yes;treatment indicated  -HK        Rehab Potential (OT Eval) good, to achieve stated therapy goals  -HK        Therapy Frequency (OT Eval) daily   if pt continues to be somnolent will change to 3x/week  -HK        Care Plan Review (OT) care plan/treatment goals reviewed;evaluation/treatment results reviewed;patient/other agree to care plan  -HK        Care Plan Review, Other Participant (OT Eval) other (see comments)   niece  -HK        Anticipated Discharge Disposition (OT) skilled nursing facility  -HK        Patient/Family Concerns, Anticipated Discharge Disposition (OT) Family would like her to return to memory care; however pt is currently dependent for all mobility and must be Claudia for memory care. Pt will need rehab prior to returning to memory care.   -HK           Vital Signs    Pre Systolic BP Rehab --   RN cleared for tx  -HK        Pre Patient Position Supine  -HK        Intra  Patient Position Sitting  -HK        Post Patient Position Supine  -HK           OT Goals    Bed Mobility Goal Selection (OT) bed mobility, OT goal 1  -HK        Grooming Goal Selection (OT) grooming, OT goal 1  -HK        Balance Goal Selection (OT) balance, OT goal 1  -HK        Additional Documentation Balance Goal Selection (OT) (Row);Grooming Goal Selection (OT) (Row)  -HK           Bed Mobility Goal 1 (OT)    Activity/Assistive Device (Bed Mobility Goal 1, OT) sit to supine/supine to sit;scooting  -HK        [place] Level/Cues Needed (Bed Mobility Goal 1, OT) maximum assist (25-49% patient effort);verbal cues required  -HK        Time Frame (Bed Mobility Goal 1, OT) 5 days  -HK        Progress/Outcomes (Bed Mobility Goal 1, OT) goal ongoing  -HK           Grooming Goal 1 (OT)    Activity/Device (Grooming Goal 1, OT) hair care;oral care;wash face, hands  -HK        [place] (Grooming Goal 1, OT) moderate assist (50-74% patient effort);verbal cues required  -HK        Time Frame (Grooming Goal 1, OT) 5 days  -HK        Progress/Outcome (Grooming Goal 1, OT) goal ongoing  -HK           Balance Goal 1 (OT)    Activity/Assistive Device (Balance Goal 1, OT) sitting, static  -HK        [place] Level/Cues Needed (Balance Goal 1, OT) moderate assist (50-74% patient effort)  -HK        Time Frame (Balance Goal 1, OT) 5 days  -HK        Progress/Outcomes (Balance Goal 1, OT) goal ongoing  -HK           OT Cognitive Goals    Attention Goal Selection (OT) attention, OT goal 1  -HK           Attention Goal 1 (OT)    Activity (Attention Goal 1, OT) maintain arousal/alertness;for 10 minutes  -HK        [place] Level/Cues Needed (Attention Goal 1, OT) with minimal;verbal cues/redirection  -HK        Time Frame (Attention Goal 1, OT) 5 days  -HK        Progress/Outcomes (Attention Goal 1, OT) goal ongoing  -HK           Living Environment    Home Accessibility --   no concerns   -HK          User Key  (r) =  Recorded By, (t) = Taken By, (c) = Cosigned By    Initials Name Effective Dates    JENNIFER MORENO Coleman, OT 03/07/18 -            Occupational Therapy Education     Title: PT OT SLP Therapies (Active)     Topic: Occupational Therapy (Done)     Point: ADL training (Done)     Description: Instruct learner(s) on proper safety adaptation and remediation techniques during self care or transfers.   Instruct in proper use of assistive devices.   Learning Progress Summary     Learner Status Readiness Method Response Comment Documented by    Family Done Acceptance E VU Pts niece educated on ADL retraining with grooming and feeding, safety precautions, BUE HEP, and appropriate body mechanics.  07/13/18 1142          Point: Home exercise program (Done)     Description: Instruct learner(s) on appropriate technique for monitoring, assisting and/or progressing therapeutic exercises/activities.   Learning Progress Summary     Learner Status Readiness Method Response Comment Documented by    Family Done Acceptance E VU Pts niece educated on ADL retraining with grooming and feeding, safety precautions, BUE HEP, and appropriate body mechanics.  07/13/18 1142          Point: Precautions (Done)     Description: Instruct learner(s) on prescribed precautions during self-care and functional transfers.   Learning Progress Summary     Learner Status Readiness Method Response Comment Documented by    Family Done Acceptance E VU Pts niece educated on ADL retraining with grooming and feeding, safety precautions, BUE HEP, and appropriate body mechanics.  07/13/18 1142          Point: Body mechanics (Done)     Description: Instruct learner(s) on proper positioning and spine alignment during self-care, functional mobility activities and/or exercises.   Learning Progress Summary     Learner Status Readiness Method Response Comment Documented by    Family Done Acceptance E VU Pts niece educated on ADL retraining with grooming and feeding, safety  precautions, BUE HEP, and appropriate body mechanics.  07/13/18 1142                      User Key     Initials Effective Dates Name Provider Type Discipline     03/07/18 -  Naomi Cee, OT Occupational Therapist OT                  OT Recommendation and Plan  Outcome Summary/Treatment Plan (OT)  Anticipated Discharge Disposition (OT): skilled nursing facility  Patient/Family Concerns, Anticipated Discharge Disposition (OT): Family would like her to return to memory care; however pt is currently dependent for all mobility and must be Claudia for memory care. Pt will need rehab prior to returning to memory care.   Therapy Frequency (OT Eval): daily (if pt continues to be somnolent will change to 3x/week)  Plan of Care Review  Plan of Care Reviewed With: patient  Plan of Care Reviewed With: patient  Outcome Summary: OT eval complete. Pt is limited by decreased alertness and ability to follow commands. Pt is currently dependent for bed mobility and no appropriate for transfers due to poor core strength and decreased alertness. Pt required maxA to wash face. Erich would like pt seen daily at this time. Will see daily however if pt not more alert after session 3 will change to 3x/week. Recommend discarge to SNF rehab when medically ready.           Outcome Measures     Row Name 07/13/18 0835 07/12/18 1348          How much help from another person do you currently need...    Turning from your back to your side while in flat bed without using bedrails?  -- 1  -KR     Moving from lying on back to sitting on the side of a flat bed without bedrails?  -- 1  -KR     Moving to and from a bed to a chair (including a wheelchair)?  -- 1  -KR     Standing up from a chair using your arms (e.g., wheelchair, bedside chair)?  -- 1  -KR     Climbing 3-5 steps with a railing?  -- 1  -KR     To walk in hospital room?  -- 1  -KR     AM-PAC 6 Clicks Score  -- 6  -KR        How much help from another is currently needed...    Putting on  and taking off regular lower body clothing? 1  -HK  --     Bathing (including washing, rinsing, and drying) 1  -HK  --     Toileting (which includes using toilet bed pan or urinal) 1  -HK  --     Putting on and taking off regular upper body clothing 1  -HK  --     Taking care of personal grooming (such as brushing teeth) 1  -HK  --     Eating meals 1  -HK  --     Score 6  -HK  --        Functional Assessment    Outcome Measure Options AM-PAC 6 Clicks Daily Activity (OT)  -HK AM-PAC 6 Clicks Basic Mobility (PT)  -KR       User Key  (r) = Recorded By, (t) = Taken By, (c) = Cosigned By    Initials Name Provider Type    KARON Mchugh, PT Physical Therapist     Naomi Cee OT Occupational Therapist          Time Calculation:   OT Start Time: 0835  Therapy Suggested Charges     Code   Minutes Charges    None           Therapy Charges for Today     Code Description Service Date Service Provider Modifiers Qty    42823207543  OT EVAL MOD COMPLEXITY 4 7/13/2018 Naomi Cee OT GO 1    [id number]  OT THER SUPP EA 15 MIN 7/13/2018 Naomi Cee OT GO 3               Naomi Cee OT  7/13/2018

## 2018-07-13 NOTE — PLAN OF CARE
Problem: Patient Care Overview  Goal: Plan of Care Review  Outcome: Ongoing (interventions implemented as appropriate)   07/13/18 1143   Coping/Psychosocial   Plan of Care Reviewed With patient   Plan of Care Review   Progress improving   OTHER   Outcome Summary OT eval complete. Pt is limited by decreased alertness and ability to follow commands. Pt is currently dependent for bed mobility and no appropriate for transfers due to poor core strength and decreased alertness. Pt required maxA to wash face. Erich would like pt seen daily at this time. Will see daily however if pt not more alert after session 3 will change to 3x/week. Recommend discarge to SNF rehab when medically ready.    Coping/Psychosocial   Patient Agreement with Plan of Care agrees

## 2018-07-13 NOTE — PLAN OF CARE
Problem: Patient Care Overview  Goal: Plan of Care Review  Outcome: Ongoing (interventions implemented as appropriate)   07/13/18 1341   Coping/Psychosocial   Plan of Care Reviewed With patient;family   Plan of Care Review   Progress improving

## 2018-07-13 NOTE — PLAN OF CARE
Problem: Skin Injury Risk (Adult)  Goal: Skin Health and Integrity  Outcome: Ongoing (interventions implemented as appropriate)  Will continue to monitor skin for any pressure ulcer.

## 2018-07-13 NOTE — PROGRESS NOTES
"                  Clinical Nutrition     Nutrition Assessment  Reason for Visit:   Follow-up protocol      Patient Name: Arabella Gomes  YOB: 1927  MRN: [id number]  Date of Encounter: 07/13/18 3:24 PM  Admission date: 7/10/2018      Nutrition Assessment   Assessment       Hospital Problem List  Principal Problem:    Sepsis (CMS/HCC)  Active Problems:    Alzheimer's disease    Acute kidney injury (CMS/HCC)    CKD (chronic kidney disease) stage 3, GFR 30-59 ml/min    Acute cystitis with hematuria    Right lower lobe pneumonia (CMS/HCC)    Fever    Hypotension      PMH: She  has a past medical history of Anemia of chronic disease; Anxiety; CKD (chronic kidney disease) stage 3, GFR 30-59 ml/min; Dementia; and LBBB (left bundle branch block).   PSxH: She  has a past surgical history that includes No past surgeries.       Reported/Observed/Food/Nutrition Related History:     Pt resting during time of visit. Per family at bedside pt has not been eating much today, reported that she is a vegetarian- they reported already letting the  know. Spoke with , he reported pt family told him that the pt does consume dairy and eggs. Family reported that pt does best with carrots, apple sauce, cream of wheat, and sweet foods.       Anthropometrics     Height: 162.6 cm (64\")  Last filed wt: Weight: 58.5 kg (129 lb) (07/10/18 1217)  Weight Method: Estimated    BMI: BMI (Calculated): 22.1  Normal: 18.5-24.9kg/m2    Ideal Body Weight (IBW) (kg): 55      Labs reviewed       Results from last 7 days  Lab Units 07/13/18  0633 07/12/18  0458 07/11/18  0420 07/10/18  1223   GLUCOSE mg/dL 90 66* 78 101*   BUN mg/dL 10 13 18 20   CREATININE mg/dL 1.03 1.06 1.25 1.58*   SODIUM mmol/L 145 144 144 141   CHLORIDE mmol/L 113* 116* 115* 105   POTASSIUM mmol/L 3.9 3.4* 4.0 4.7   MAGNESIUM mg/dL  --   --   --  2.1   ALT (SGPT) U/L  --   --   --  14       Results from last 7 days  Lab Units " "07/10/18  1223   ALBUMIN g/dL 3.72             No results found for: HGBA1C      Medications reviewed     Applicable medical tests/Procedures since admission:  SLP (7/12/18): \"SLP treatment completed. Appears to be tolerating mech-soft and nectar-thick liquids though clearly some risk for aspiration. However, given (appropriate) wishes for no MBS or feeding tubes for pt, this remains safest diet for pt. Will re-assess for ? Advancement to thin liquids if/as strength improves. If does not, can revisit wishes for safest vs comfort as appropriate (though currently showing no aversion to the nectar-thick liquids). Please see note for further details and recommendations.\"    SLP (7/13/18): \"SLP treatment completed. Pt too lethargic for breakfast this AM. Per niece, pt did well with dinner last night. Niece with good understanding of dysphagia and precautions. She remains hopeful but appears understanding of adjustment to GOC if does not improve with this admit. Will continue to address. Please see note for further details and recommendations.\"     Intake/Ouptut 24 hrs (7:00AM - 6:59 AM)     Intake & Output (last day)       07/12 0701 - 07/13 0700 07/13 0701 - 07/14 0700    P.O. 480     I.V. (mL/kg)      IV Piggyback 300     Total Intake(mL/kg) 780 (13.3)     Net +780            Unmeasured Urine Occurrence 4 x           Current Nutrition Prescription     PO: Diet Dysphagia; IV - Mechanical Soft No Mixed Consistencies; Nectar / Syrup Thick; No Straws    Intake:  Per charting pt consuming 43% of 4 meals (7/11/18-7/12/18)         Nutrition Diagnosis       Problem Inadequate oral intake   Etiology Clinical condition   Signs/Symptoms PO intake 43% of 4 meals        Nutrition Intervention   1.  Follow treatment progress, Care plan reviewed, Interview for preferences, Menu adjusted, Encourage intake, Supplement provided    Will adjust pt menu to meet pt's dietary needs- vegetarian lacto-ovo  Will place order for boost pudding " daily with lunch trays    Goal:   General: Nutrition support treatment  PO: Increase intake  Additional goals:      Monitoring/Evaluation:   Per protocol, PO intake, Supplement intake      Will Continue to follow per protocol      Trupti JOHN Waits  Time Spent: 20 minutes

## 2018-07-13 NOTE — THERAPY TREATMENT NOTE
Acute Care - Speech Language Pathology   Swallow Treatment Note [facility]     Patient Name: Arabella Gomes  : 1927  MRN: [id number]  Today's Date: 2018  Onset of Illness/Injury or Date of Surgery: 07/10/18     Referring Physician: Fabián MOORE MD      Admit Date: 7/10/2018    Visit Dx:      ICD-10-CM ICD-9-CM   1. Altered mental status, unspecified altered mental status type R41.82 780.97   2. Sepsis, due to unspecified organism (CMS/HCC) A41.9 038.9     995.91   3. Urinary tract infection with hematuria, site unspecified N39.0 599.0    R31.9    4. Renal insufficiency N28.9 593.9   5. Anemia, unspecified type D64.9 285.9   6. Dysphagia, unspecified type R13.10 787.20   7. Impaired functional mobility, balance, gait, and endurance Z74.09 V49.89     Patient Active Problem List   Diagnosis   • Alzheimer's disease   • Low back pain   • Acute kidney injury (CMS/HCC)   • Chronic Macrocytic anemia   • Failure to thrive in adult, likely due to moderate-severe dementia   • Dysphagia   • Moderate protein-calorie malnutrition (CMS/HCC)   • CKD (chronic kidney disease) stage 3, GFR 30-59 ml/min   • Sepsis (CMS/HCC)   • Acute cystitis with hematuria   • Right lower lobe pneumonia (CMS/HCC)   • Fever   • Hypotension       Therapy Treatment    Therapy Treatment / Health Promotion    Treatment Time/Intention  Discipline: speech language pathologist (18 1020 : Magdalena Monreal MS CCC-SLP)  Document Type: therapy note (daily note) (18 1020 : Magdalena Monreal MS CCC-SLP)  Care Plan Review: care plan/treatment goals reviewed, risks/benefits reviewed, patient/other agree to care plan (18 1020 : Magdalena Monreal MS CCC-SLP)  Care Plan Review, Other Participant(s): other (see comments) (niece) (18 1020 : Magdalena Monreal MS CCC-SLP)  Comment: Pt lethargic, min success at awaking (18 1020 : Magdalena Monreal MS CCC-SLP)  Plan of Care Review  Plan of Care Reviewed With: family  (07/13/18 1048 : Magdalena Monreal, MS CCC-SLP)    Vitals/Pain/Safety       Cognition, Communication, Swallow       Outcome Summary  Outcome Summary/Treatment Plan (SLP)  Plan for Continued Treatment (SLP): Will continue to follow for dysphagia management and adjust GOC as appropriate (07/13/18 1020 : Magdalena Monreal, MS CCC-SLP)            SLP GOALS     Row Name 07/13/18 1020 07/12/18 1020 07/11/18 0915       Oral Nutrition/Hydration Goal 1 (SLP)    Oral Nutrition/Hydration Goal 1, SLP LTG: Pt will tolerate soft whole foods and thin liquids w/ 100% accuracy w/ min cues.   -SM LTG: Pt will tolerate soft whole foods and thin liquids w/ 100% accuracy w/ min cues.   -SM LTG: Pt will tolerate soft whole foods and thin liquids w/ 100% accuracy w/ min cues.   -RD    Time Frame (Oral Nutrition/Hydration Goal 1, SLP) by discharge  -SM by discharge  -SM by discharge  -RD    Progress/Outcomes (Oral Nutrition/Hydration Goal 1, SLP) continuing progress toward goal  -SM continuing progress toward goal  -SM  --       Swallow Management Recall Goal (SLP)    Swallow Management Recall Goal (SLP) Pt will tolerate Nectar-thick and level 4 dys diet (no straws) w/ no overt s/s of aspiration or complications w/ 100% accuracy w/ min cues and assist  -SM Pt will tolerate Nectar-thick and level 4 dys diet (no straws) w/ no overt s/s of aspiration or complications w/ 100% accuracy w/ min cues and assist  -SM Pt will tolerate Nectar-thick and level 4 dys diet (no straws) w/ no overt s/s of aspiration or complications w/ 100% accuracy w/ min cues and assist  -RD    Time Frame (Swallow Management Recall Goal, SLP) short term goal (STG);by discharge  -SM short term goal (STG);by discharge  -SM short term goal (STG);by discharge  -RD    Barriers (Swallow Management Recall Goal, SLP) Per niece, did well with dinner last night - good intake and no s/s aspiration. this morning, pt very fatigued. Not alert enough for intake of breakfast, had  some NT water off spoon without issue. Niece with good understanding of all. Carlyle continue as is for now, knowing may adjust based on any improvements or decline in pt.   -SM No aversion. Delayed cough x1 during trials. Remains at least some risk for aspiration. But given wishes for no MBS or TFs, this remains safest clinical option for pt.   -SM  --    Progress/Outcomes (Swallow Management Recall Goal, SLP) continuing progress toward goal  -SM  --  --       Swallow Compensatory Strategies Goal (SLP)    Swallow Compensatory Strategies/Techniques Goal (SLP) Pt will tolerate trials of thin liquids w/ no overt s/s of aspiration and complications w/ 80% accuracy w/ min cues.   -SM Pt will tolerate trials of thin liquids w/ no overt s/s of aspiration and complications w/ 80% accuracy w/ min cues.   -SM Pt will tolerate trials of thin liquids w/ no overt s/s of aspiration and complications w/ 80% accuracy w/ min cues.   -RD    Time Frame (Swallow Compensatory Strategies/Techniques Goal, SLP) short term goal (STG);by discharge  -SM short term goal (STG);by discharge  -SM short term goal (STG);by discharge  -RD    Barriers (Swallow Compensatory Strategies/Techniques Goal, SLP) Too lethargic to address  -SM Did not target 2' sleepy - focus was on tolerance of thickened liquids  -SM  --    Progress/Outcomes (Swallow Compensatory Strategies/Techniques Goal, SLP) goal ongoing  - continuing progress toward goal  -SM  --      User Key  (r) = Recorded By, (t) = Taken By, (c) = Cosigned By    Initials Name Provider Type     Magdalena Monreal, MS CCC-SLP Speech and Language Pathologist    SUMMER Moran, MS CCC-SLP Speech and Language Pathologist          EDUCATION  The patient's niece has been educated in the following areas:   Dysphagia (Swallowing Impairment) Oral Care/Hydration Modified Diet Instruction.    SLP Recommendation and Plan                                        Plan of Care Reviewed With: family  Plan of  Care Review  Plan of Care Reviewed With: family  Daily Summary of Progress (SLP): progress toward functional goals as expected  Plan for Continued Treatment (SLP): Will continue to follow for dysphagia management and adjust GOC as appropriate           Time Calculation:         Time Calculation- SLP     Row Name 07/13/18 1050             Time Calculation- SLP    SLP Start Time 1020  -      SLP Received On 07/13/18  -        User Key  (r) = Recorded By, (t) = Taken By, (c) = Cosigned By    Initials Name Provider Type     Magdalena Monreal MS CCC-SLP Speech and Language Pathologist          Therapy Charges for Today     Code Description Service Date Service Provider Modifiers Qty    24984052771 HC ST TREATMENT SWALLOW 2 7/12/2018 Magdalena Monreal MS CCC-SLP GN 1    30931802699 HC ST TREATMENT SWALLOW 2 7/13/2018 Magdalena Monreal MS CCC-SLP GN 1                 Magdalena Monreal MS CCC-SLP  7/13/2018

## 2018-07-13 NOTE — PLAN OF CARE
Problem: Patient Care Overview  Goal: Plan of Care Review  Outcome: Ongoing (interventions implemented as appropriate)   07/13/18 1048   Coping/Psychosocial   Plan of Care Reviewed With family   SLP treatment completed. Pt too lethargic for breakfast this AM. Per niece, pt did well with dinner last night. Niece with good understanding of dysphagia and precautions. She remains hopeful but appears understanding of adjustment to GOC if does not improve with this admit. Will continue to address. Please see note for further details and recommendations.

## 2018-07-13 NOTE — PROGRESS NOTES
Continued Stay Note  [facility]     Patient Name: Arabella Gomes  MRN: [id number]  Today's Date: 7/13/2018    Admit Date: 7/10/2018          Discharge Plan     Row Name 07/13/18 1435       Plan    Plan SNF    Patient/Family in Agreement with Plan yes    Plan Comments I spoke with the pts kacey Gage [phone] at the . She is aware the pt can not return to  at this time. The pt has been to [facility] in the past. I have made a referral to Hilda at [facility] per family choice. Another kacey Garland [phone] will be around to assist their father (the pts POA brother) as needed. The neice is aware the pt may decline. Hilda from [facility] will f/u on Monday for pt progress. Dr Lockwood is aware. USMAN will f/u Monday.              Discharge Codes    No documentation.       Expected Discharge Date and Time     Expected Discharge Date Expected Discharge Time    Jul 16, 2018             Kati Martin RN

## 2018-07-13 NOTE — PROGRESS NOTES
[facility] Medicine Services  PROGRESS NOTE    Patient Name: Arabella Gomes  : 1927  MRN: [id number]    Date of Admission: 7/10/2018  Length of Stay: 3  Primary Care Physician: No Known Provider    Subjective   Subjective     CC:  F/U AMS    HPI:  Patient seen this morning. More lethargic today. Niece at bedside, worried we are not getting patient up out of bed enough and she is getting weaker.     Review of Systems  Unable to obtain due to mental status    Otherwise ROS is negative except as mentioned in the HPI.    Objective   Objective     Vital Signs:   Temp:  [98.6 °F (37 °C)-99.1 °F (37.3 °C)] 99.1 °F (37.3 °C)  Heart Rate:  [76-93] 76  Resp:  [16] 16  BP: (142)/(77) 142/77        Physical Exam:  Gen-no acute distress  CV-RRR, S1 S2 normal, no m/r/g  Resp-CTAB, no wheezes  Abd-soft, NT, ND, +BS  Ext-no edema  Neuro-lethargic, does open eyes briefly and attempt to answer questions with encouragement, no focal deficits  Psych-appropriate mood      Results Reviewed:  I have personally reviewed current lab, radiology, and data and agree.      Results from last 7 days  Lab Units 07/13/18  0633 07/12/18  0458 07/11/18  0420 07/10/18  1223   WBC 10*3/mm3 5.49 5.33 4.51 5.45   HEMOGLOBIN g/dL 8.3* 7.9* 7.5* 9.0*   HEMATOCRIT % 24.9* 24.4* 23.2* 27.5*   PLATELETS 10*3/mm3 120* 107* 110* 146*   INR   --   --   --  1.07       Results from last 7 days  Lab Units 07/13/18  0633 07/12/18  0458 07/11/18  0420 07/10/18  1223   SODIUM mmol/L 145 144 144 141   POTASSIUM mmol/L 3.9 3.4* 4.0 4.7   CHLORIDE mmol/L 113* 116* 115* 105   CO2 mmol/L 24.0 19.0* 21.0 26.0   BUN mg/dL 10  18 20   CREATININE mg/dL 1.03 1.06 1.25 1.58*   GLUCOSE mg/dL 90 66* 78 101*   CALCIUM mg/dL 7.7* 7.7* 7.6* 8.6*   ALT (SGPT) U/L  --   --   --  14   AST (SGOT) U/L  --   --   --  25     Estimated Creatinine Clearance: 32.9 mL/min (by C-G formula based on SCr of 1.03 mg/dL).  No results found for:  BNP    Microbiology Results Abnormal     Procedure Component Value - Date/Time    Blood Culture - Blood, [[id number]] Collected:  07/10/18 1240    Lab Status:  Preliminary result Specimen:  Blood from Arm, Left Updated:  07/12/18 1315     Blood Culture No growth at 2 days      Aerobic bottle only    Blood Culture - Blood, [[id number]]  (Normal) Collected:  07/10/18 1250    Lab Status:  Preliminary result Specimen:  Blood from Wrist, Left Updated:  07/12/18 1315     Blood Culture No growth at 2 days    Urine Culture - Urine, Urine, Catheter [[id number]]  (Abnormal) Collected:  07/10/18 1223    Lab Status:  Preliminary result Specimen:  Urine from Urine, Catheter Updated:  07/12/18 1106     Urine Culture 20,000-30,000 CFU/mL Yeast isolated (A)    Influenza Antigen, Rapid - Swab, Nasopharynx [[id number]]  (Normal) Collected:  07/10/18 1224    Lab Status:  Final result Specimen:  Swab from Nasopharynx Updated:  07/10/18 1247     Influenza A Ag, EIA Negative     Influenza B Ag, EIA Negative          Imaging Results (last 24 hours)     ** No results found for the last 24 hours. **             I have reviewed the medications.    Assessment/Plan   Assessment / Plan     Hospital Problem List     * (Principal)Sepsis (CMS/[facility])    Acute cystitis with hematuria    Right lower lobe pneumonia (CMS/[facility])    Fever    Hypotension    Alzheimer's disease (Chronic)    Acute kidney injury (CMS/[facility])    CKD (chronic kidney disease) stage 3, GFR 30-59 ml/min             Brief Hospital Course to date:  Arabella Gomse is a 89 y.o. female with PMH significant for Alzheimer's dementia and CKD III. Admitted to [facility] 6/16-6/20/18 with confusion, fevers/chills and cough. Treated for pneumonia. Respiratory PCR (+) rhinovirus. Discharged on Doxycycline. She returned to [facility] ED 7/10/18 with AMS. Evaluation revealed sepsis secondary to UTI and RLL pneumonia.       Assessment & Plan:  --Blood cultures NGTD. Deescalated from Vanc/Zosyn to just Zosyn. Will switch  to Augmentin today and complete 7 day course. Urine culture with yeast only, will not treat.  --KARIN resolved. Stopped fluids.   --H&H down but likely dilutional related to fluid resuscitation. Better today. Monitor.  --More lethargic today: no sedating meds have been administered, clinically her sepsis has resolved. Monitor closely.  --PT/OT following. She appears quite weak and may need SNF prior to returning to her memory care unit. CM to follow up.    DVT Prophylaxis:  [facility]    CODE STATUS:   Code Status and Medical Interventions:   Ordered at: 07/10/18 1457     Limited Support to NOT Include:    Artificial Nutrition     Code Status:    No CPR     Medical Interventions (Level of Support Prior to Arrest):    Limited     Comments:    Living will reviewed       Disposition: I expect the patient to be discharged to SNF vs memory care unit ~2 days      Electronically signed by Sarah Lockwood MD, 07/13/18, 11:56 AM.

## 2018-07-14 LAB
ANION GAP SERPL CALCULATED.3IONS-SCNC: 9 MMOL/L (ref 3–11)
BUN BLD-MCNC: 9 MG/DL (ref 9–23)
BUN/CREAT SERPL: 7.8 (ref 7–25)
CALCIUM SPEC-SCNC: 7.7 MG/DL (ref 8.7–10.4)
CHLORIDE SERPL-SCNC: 112 MMOL/L (ref 99–109)
CO2 SERPL-SCNC: 26 MMOL/L (ref 20–31)
CREAT BLD-MCNC: 1.15 MG/DL (ref 0.6–1.3)
DEPRECATED RDW RBC AUTO: 64.9 FL (ref 37–54)
ERYTHROCYTE [DISTWIDTH] IN BLOOD BY AUTOMATED COUNT: 17 % (ref 11.3–14.5)
GFR SERPL CREATININE-BSD FRML MDRD: 44 ML/MIN/1.73
GLUCOSE BLD-MCNC: 98 MG/DL (ref 70–100)
HCT VFR BLD AUTO: 27.8 % (ref 34.5–44)
HGB BLD-MCNC: 9.2 G/DL (ref 11.5–15.5)
MCH RBC QN AUTO: 35 PG (ref 27–31)
MCHC RBC AUTO-ENTMCNC: 33.1 G/DL (ref 32–36)
MCV RBC AUTO: 105.7 FL (ref 80–99)
PLATELET # BLD AUTO: 124 10*3/MM3 (ref 150–450)
PMV BLD AUTO: 12.5 FL (ref 6–12)
POTASSIUM BLD-SCNC: 3.2 MMOL/L (ref 3.5–5.5)
RBC # BLD AUTO: 2.63 10*6/MM3 (ref 3.89–5.14)
SODIUM BLD-SCNC: 147 MMOL/L (ref 132–146)
WBC NRBC COR # BLD: 6.82 10*3/MM3 (ref 3.5–10.8)

## 2018-07-14 PROCEDURE — 25010000002 HEPARIN (PORCINE) PER 1000 UNITS: Performed by: PHYSICIAN ASSISTANT

## 2018-07-14 PROCEDURE — 84132 ASSAY OF SERUM POTASSIUM: CPT | Performed by: FAMILY MEDICINE

## 2018-07-14 PROCEDURE — 80048 BASIC METABOLIC PNL TOTAL CA: CPT | Performed by: HOSPITALIST

## 2018-07-14 PROCEDURE — 99232 SBSQ HOSP IP/OBS MODERATE 35: CPT | Performed by: FAMILY MEDICINE

## 2018-07-14 PROCEDURE — 97110 THERAPEUTIC EXERCISES: CPT

## 2018-07-14 PROCEDURE — 25010000002 MAGNESIUM SULFATE IN D5W 1G/100ML (PREMIX) 1-5 GM/100ML-% SOLUTION: Performed by: FAMILY MEDICINE

## 2018-07-14 PROCEDURE — 85027 COMPLETE CBC AUTOMATED: CPT | Performed by: HOSPITALIST

## 2018-07-14 RX ORDER — MAGNESIUM SULFATE 1 G/100ML
1 INJECTION INTRAVENOUS ONCE
Status: COMPLETED | OUTPATIENT
Start: 2018-07-14 | End: 2018-07-14

## 2018-07-14 RX ADMIN — DOCUSATE SODIUM 100 MG: 100 CAPSULE, LIQUID FILLED ORAL at 09:39

## 2018-07-14 RX ADMIN — POTASSIUM CHLORIDE 40 MEQ: 1.5 POWDER, FOR SOLUTION ORAL at 17:40

## 2018-07-14 RX ADMIN — AMOXICILLIN AND CLAVULANATE POTASSIUM 500 MG: 500; 125 TABLET, FILM COATED ORAL at 20:25

## 2018-07-14 RX ADMIN — HEPARIN SODIUM 5000 UNITS: 5000 INJECTION, SOLUTION INTRAVENOUS; SUBCUTANEOUS at 20:24

## 2018-07-14 RX ADMIN — POTASSIUM CHLORIDE 40 MEQ: 750 CAPSULE, EXTENDED RELEASE ORAL at 09:39

## 2018-07-14 RX ADMIN — MAGNESIUM SULFATE HEPTAHYDRATE 1 G: 1 INJECTION, SOLUTION INTRAVENOUS at 17:32

## 2018-07-14 RX ADMIN — AMOXICILLIN AND CLAVULANATE POTASSIUM 500 MG: 500; 125 TABLET, FILM COATED ORAL at 11:30

## 2018-07-14 RX ADMIN — HEPARIN SODIUM 5000 UNITS: 5000 INJECTION, SOLUTION INTRAVENOUS; SUBCUTANEOUS at 09:39

## 2018-07-14 RX ADMIN — DOCUSATE SODIUM 100 MG: 100 CAPSULE, LIQUID FILLED ORAL at 20:25

## 2018-07-14 NOTE — PLAN OF CARE
Problem: Patient Care Overview  Goal: Plan of Care Review  Outcome: Ongoing (interventions implemented as appropriate)   07/14/18 1335   Coping/Psychosocial   Plan of Care Reviewed With patient   OTHER   Outcome Summary Pt participated very little with therex; quickly became agitated. Refused to sit EOB but did, at one point, sit up in the bed on her own (when agitated).       07/14/18 1335   Coping/Psychosocial   Plan of Care Reviewed With patient   OTHER   Outcome Summary Pt participated very little with therex; quickly became agitated. Refused to sit EOB but did, at one point, sit up in the bed on her own (when agitated). Cognitive status is limiting her partic   07/14/18 1335   Coping/Psychosocial   Plan of Care Reviewed With patient   OTHER   Outcome Summary Pt participated very little with therex; quickly became agitated. Refused to sit EOB but did, at one point, sit up in the bed on her own (when agitated). Cognitive status is limiting her participation.   ipation and progress.

## 2018-07-14 NOTE — PROGRESS NOTES
[facility] Medicine Services  PROGRESS NOTE    Patient Name: Arabella Gomes  : 1927  MRN: [id number]    Date of Admission: 7/10/2018  Length of Stay: 4  Primary Care Physician: No Known Provider    Subjective   Subjective     CC:  F/U AMS    HPI:  Still with wax/waning lethargy today. Family at bedside, discussed possibility of slow natural decline given age, recent multiple hosp stays ,dementia.  No specific complaints noted from patient per nursing.     Review of Systems  Unable to obtain due to mental status    Otherwise ROS is negative except as mentioned in the HPI.    Objective   Objective     Vital Signs:   Temp:  [98.5 °F (36.9 °C)-101 °F (38.3 °C)] 98.5 °F (36.9 °C)  Heart Rate:  [] 106  Resp:  [16-22] 18  BP: (123-147)/(68-84) 147/68        Physical Exam:  Gen-no acute distress  CV-RRR, S1 S2 normal, no m/r/g  Resp-CTAB, no wheezes  Abd-soft, NT, ND, +BS  Ext-no edema  Neuro-lethargic, willopen eyes briefly and answer simple questions with encouragement, no focal deficits  Psych-appropriate mood      Results Reviewed:  I have personally reviewed current lab, radiology, and data and agree.      Results from last 7 days  Lab Units 18  0647 18  0618  0458  07/10/18  1223   WBC 10*3/mm3 6.82 5.49 5.33  < > 5.45   HEMOGLOBIN g/dL 9.2* 8.3* 7.9*  < > 9.0*   HEMATOCRIT % 27.8* 24.9* 24.4*  < > 27.5*   PLATELETS 10*3/mm3 124* 120* 107*  < > 146*   INR   --   --   --   --  1.07   < > = values in this interval not displayed.    Results from last 7 days  Lab Units 18  0647 18  0633 18  0458  07/10/18  1223   SODIUM mmol/L 147* 145 144  < > 141   POTASSIUM mmol/L 3.2* 3.9 3.4*  < > 4.7   CHLORIDE mmol/L 112* 113* 116*  < > 105   CO2 mmol/L 26.0 24.0 19.0*  < > 26.0   BUN mg/dL 9 10 13  < > 20   CREATININE mg/dL 1.15 1.03 1.06  < > 1.58*   GLUCOSE mg/dL 98 90 66*  < > 101*   CALCIUM mg/dL 7.7* 7.7* 7.7*  < > 8.6*   ALT (SGPT) U/L  --   --    --   --  14   AST (SGOT) U/L  --   --   --   --  25   < > = values in this interval not displayed.  Estimated Creatinine Clearance: 29.4 mL/min (by C-G formula based on SCr of 1.15 mg/dL).  No results found for: BNP    Microbiology Results Abnormal     Procedure Component Value - Date/Time    Blood Culture - Blood, [[id number]] Collected:  07/10/18 1240    Lab Status:  Preliminary result Specimen:  Blood from Arm, Left Updated:  07/13/18 1316     Blood Culture No growth at 3 days      Aerobic bottle only    Blood Culture - Blood, [[id number]]  (Normal) Collected:  07/10/18 1250    Lab Status:  Preliminary result Specimen:  Blood from Wrist, Left Updated:  07/13/18 1315     Blood Culture No growth at 3 days    Urine Culture - Urine, Urine, Catheter [[id number]]  (Abnormal) Collected:  07/10/18 1223    Lab Status:  Final result Specimen:  Urine from Urine, Catheter Updated:  07/13/18 1257     Urine Culture 20,000-30,000 CFU/mL Candida albicans (A)    Influenza Antigen, Rapid - Swab, Nasopharynx [[id number]]  (Normal) Collected:  07/10/18 1224    Lab Status:  Final result Specimen:  Swab from Nasopharynx Updated:  07/10/18 1247     Influenza A Ag, EIA Negative     Influenza B Ag, EIA Negative          Imaging Results (last 24 hours)     ** No results found for the last 24 hours. **             I have reviewed the medications.    Assessment/Plan   Assessment / Plan     Hospital Problem List     * (Principal)Sepsis (CMS/[facility])    Alzheimer's disease (Chronic)    Acute kidney injury (CMS/[facility])    CKD (chronic kidney disease) stage 3, GFR 30-59 ml/min    Acute cystitis with hematuria    Right lower lobe pneumonia (CMS/[facility])    Fever    Hypotension             Brief Hospital Course to date:  Arabella Gomes is a 89 y.o. female with PMH significant for Alzheimer's dementia and CKD III. Admitted to [facility] 6/16-6/20/18 with confusion, fevers/chills and cough. Treated for pneumonia. Respiratory PCR (+) rhinovirus. Discharged on Doxycycline.  She returned to [facility] ED 7/10/18 with AMS. Evaluation revealed sepsis secondary to UTI and RLL pneumonia.       Assessment & Plan:  --Blood cultures NGTD. Deescalated from Vanc/Zosyn to just Zosyn. Will switch to Augmentin today and complete 7 day course. Urine culture with yeast only, will not treat.  --Replace K+, will give 1g IV Mg++ x1 to help hypokalemia (recent Mg++ borderline at 2.1)  --Continues with wax/wane lethargy and poor PO intake. No sedating meds have been administered.  Clinically her sepsis has resolved. As d/w family, suspect multiple hospital stays, her dementia, and advanced age contributes and she may be on her natural decline.  Goal is to attempt rehab portential at skilled rehab and transition back to her Memory Care unit if able in future but based on current status she may continue to slowly decline -->  Will consult Palliative Med to help with family decisions for her care and consider full comfort if she continues to decline.  --PT/OT following. Planning to go to SNF prior to returning to her memory care unit however may not be interactive enough for skilled rehab. CM to follow up.    DVT Prophylaxis:  [facility]    CODE STATUS:   Code Status and Medical Interventions:   Ordered at: 07/10/18 2199     Limited Support to NOT Include:    Artificial Nutrition     Code Status:    No CPR     Medical Interventions (Level of Support Prior to Arrest):    Limited     Comments:    Living will reviewed       Disposition: I expect the patient to be discharged to SNF ~2 days      Electronically signed by Jamia Couch MD, 07/14/18, 11:09 AM.

## 2018-07-14 NOTE — PLAN OF CARE
Problem: Fall Risk (Adult)  Goal: Absence of Fall  Outcome: Ongoing (interventions implemented as appropriate)      Problem: Skin Injury Risk (Adult)  Goal: Skin Health and Integrity  Outcome: Ongoing (interventions implemented as appropriate)      Problem: Patient Care Overview  Goal: Plan of Care Review  Outcome: Ongoing (interventions implemented as appropriate)   07/13/18 1143 07/13/18 1945 07/14/18 0357   Coping/Psychosocial   Plan of Care Reviewed With --  patient --    Plan of Care Review   Progress --  --  improving   OTHER   Outcome Summary --  --  VSS, slight temp this shift controlled without meds, pt rested well, only got part of meds as pt spit out part of them, pt moving to a lift room when clean, no other issues/concerns   Coping/Psychosocial   Patient Agreement with Plan of Care agrees --  --      Goal: Discharge Needs Assessment  Outcome: Ongoing (interventions implemented as appropriate)      Problem: Infection, Risk/Actual (Adult)  Goal: Infection Prevention/Resolution  Outcome: Ongoing (interventions implemented as appropriate)

## 2018-07-14 NOTE — THERAPY TREATMENT NOTE
Acute Care - Physical Therapy Treatment Note  [facility]     Patient Name: Arabella Gomes  : 1927  MRN: [id number]  Today's Date: 2018  Onset of Illness/Injury or Date of Surgery: 07/10/18  Date of Referral to PT: 18  Referring Physician: MD Fabián    Admit Date: 7/10/2018    Visit Dx:    ICD-10-CM ICD-9-CM   1. Altered mental status, unspecified altered mental status type R41.82 780.97   2. Sepsis, due to unspecified organism (CMS/HCC) A41.9 038.9     995.91   3. Urinary tract infection with hematuria, site unspecified N39.0 599.0    R31.9    4. Renal insufficiency N28.9 593.9   5. Anemia, unspecified type D64.9 285.9   6. Dysphagia, unspecified type R13.10 787.20   7. Impaired functional mobility, balance, gait, and endurance Z74.09 V49.89   8. Impaired mobility and ADLs Z74.09 799.89     Patient Active Problem List   Diagnosis   • Alzheimer's disease   • Low back pain   • Acute kidney injury (CMS/HCC)   • Chronic Macrocytic anemia   • Failure to thrive in adult, likely due to moderate-severe dementia   • Dysphagia   • Moderate protein-calorie malnutrition (CMS/HCC)   • CKD (chronic kidney disease) stage 3, GFR 30-59 ml/min   • Sepsis (CMS/HCC)   • Acute cystitis with hematuria   • Right lower lobe pneumonia (CMS/HCC)   • Fever   • Hypotension       Therapy Treatment          Rehabilitation Treatment Summary     Row Name 18 1310             Treatment Time/Intention    Discipline physical therapist  -LS      Document Type therapy note (daily note)  -LS      Subjective Information --   c/o being cold  -LS      Recorded by [LS] Lillian Vuong, PT 18 1334      Row Name 18 1310             Bed Mobility Assessment/Treatment    Comment (Bed Mobility) pt refused to sit EOB and actively resisted.  however, pt did become agitated during therex and independently sat straight up in bed (from a reclined HOB of approx 30 degrees) without using bedrails.  -LS      Recorded by [LS]  Lillian Carlos Vuong, PT 07/14/18 1334      Row Name 07/14/18 1310             Transfer Assessment/Treatment    Comment (Transfers) not appropriate at this time.  -LS      Recorded by [LS] Lillian Carlos Vuong, PT 07/14/18 1334      Row Name 07/14/18 1310             Gait/Stairs Assessment/Training    Comment (Gait/Stairs) not appropriate at this time  -LS      Recorded by [LS] Lillian Vuong, PT 07/14/18 1334      Row Name 07/14/18 1310             Therapeutic Exercise    25935 - PT Therapeutic Exercise Minutes 15  -LS      Recorded by [LS] Lillian Vuong, PT 07/14/18 1334      Row Name 07/14/18 1310             Therapeutic Exercise    Lower Extremity (Therapeutic Exercise) heel slides, left;heel slides, right;SAQ (short arc quad), left;SAQ (short arc quad), right  -LS      Lower Extremity Range of Motion (Therapeutic Exercise) hip abduction/adduction, left;hip abduction/adduction, right;ankle dorsiflexion/plantar flexion, bilateral  -LS      Exercise Type (Therapeutic Exercise) AAROM (active assistive range of motion);PROM (passive range of motion)  -LS      Comment (Therapeutic Exercise) mostly PROM; pt assisted very little with LE exs; however, she did become agitated and lifted RLE up on her own to kick at therapist. BUE elbow flex/ext and sh flex X 10 PROM  -LS      Recorded by [LS] Lillian Vuong, PT 07/14/18 1334      Row Name 07/14/18 1310             Positioning and Restraints    Pre-Treatment Position in bed  -LS      In Bed notified nsg;supine;call light within reach;encouraged to call for assist;exit alarm on  -LS      Recorded by [LS] Lillian Vuong, PT 07/14/18 1334      Row Name 07/14/18 1310             Pain Scale: FACES Pre/Post-Treatment    Pain: FACES Scale, Pretreatment 0-->no hurt  -LS      Pain: FACES Scale, Post-Treatment 0-->no hurt  -LS      Recorded by [LS] Lillian Vuong, PT 07/14/18 1334        User Key  (r) = Recorded By, (t) = Taken By, (c) = Cosigned By     Initials Name Effective Dates Discipline    LS Lillian Gonzalez Yevgeniy, PT 06/19/15 -  PT                     Physical Therapy Education     Title: PT OT SLP Therapies (Active)     Topic: Physical Therapy (Active)     Point: Mobility training (Done)    Learning Progress Summary     Learner Status Readiness Method Response Comment Documented by    Family Done Acceptance E,TB RONAL CAMARGO 07/14/18 1229          Point: Home exercise program (Active)    Learning Progress Summary     Learner Status Readiness Method Response Comment Documented by    Patient Active Nonacceptance E NL Attempted to discuss benefits of activity. LS 07/14/18 1335    Family Done Acceptance E,[facility] 07/14/18 1229          Point: Body mechanics (Active)    Learning Progress Summary     Learner Status Readiness Method Response Comment Documented by    Patient Active Acceptance E NR  KR 07/12/18 1542    Family Done Acceptance E,[facility] 07/14/18 1229          Point: Precautions (Active)    Learning Progress Summary     Learner Status Readiness Method Response Comment Documented by    Patient Active Acceptance E NR  KR 07/12/18 1542    Family Done Acceptance E,[facility] 07/14/18 1229                      User Key     Initials Effective Dates Name Provider Type Discipline     06/19/15 -  Lillian Brockmitzy Vuong, PT Physical Therapist PT     04/03/18 -  Kaylee Mchugh, PT Physical Therapist PT     09/05/17 -  Malinda Carlin, RN Registered Nurse Nurse                    PT Recommendation and Plan     Plan of Care Reviewed With: patient  Outcome Summary: Pt participated very little with therex; quickly became agitated. Refused to sit EOB but did, at one point, sit up in the bed on her own (when agitated). Cognitive status is limiting her participation.          Outcome Measures     Row Name 07/14/18 1310 07/13/18 0835 07/12/18 1348       How much help from another person do you currently need...    Turning from your back to your side while in flat bed without  using bedrails? 1  -LS  -- 1  -KR    Moving from lying on back to sitting on the side of a flat bed without bedrails? 1  -LS  -- 1  -KR    Moving to and from a bed to a chair (including a wheelchair)? 1  -LS  -- 1  -KR    Standing up from a chair using your arms (e.g., wheelchair, bedside chair)? 1  -LS  -- 1  -KR    Climbing 3-5 steps with a railing? 1  -LS  -- 1  -KR    To walk in hospital room? 1  -LS  -- 1  -KR    AM-PAC 6 Clicks Score 6  -LS  -- 6  -KR       How much help from another is currently needed...    Putting on and taking off regular lower body clothing?  -- 1  -HK  --    Bathing (including washing, rinsing, and drying)  -- 1  -HK  --    Toileting (which includes using toilet bed pan or urinal)  -- 1  -HK  --    Putting on and taking off regular upper body clothing  -- 1  -HK  --    Taking care of personal grooming (such as brushing teeth)  -- 1  -HK  --    Eating meals  -- 1  -HK  --    Score  -- 6  -HK  --       Functional Assessment    Outcome Measure Options AM-PAC 6 Clicks Basic Mobility (PT)  -LS AM-PAC 6 Clicks Daily Activity (OT)  -HK AM-PAC 6 Clicks Basic Mobility (PT)  -KR      User Key  (r) = Recorded By, (t) = Taken By, (c) = Cosigned By    Initials Name Provider Type     Lillian Vuong, PT Physical Therapist    KARON Mchugh, PT Physical Therapist    HK Naomi Cee, OT Occupational Therapist           Time Calculation:         PT Charges     Row Name 07/14/18 1338 07/14/18 1310          Time Calculation    Start Time 1310  -LS  --     PT Received On 07/14/18  -  --     PT Goal Re-Cert Due Date 07/22/18  -  --        Timed Charges    61794 - PT Therapeutic Exercise Minutes  -- 15  -LS       User Key  (r) = Recorded By, (t) = Taken By, (c) = Cosigned By    Initials Name Provider Type     Lillian Vuong, PT Physical Therapist        Therapy Suggested Charges     Code   Minutes Charges    90719 (CPT®)  Pt Neuromusc Re Education Ea 15 Min      71122 (CPT®) Hc Pt Ther  Proc Ea 15 Min 15 1    59799 (CPT®) Hc Gait Training Ea 15 Min      94479 (CPT®) Hc Pt Therapeutic Act Ea 15 Min      25285 (CPT®) Hc Pt Manual Therapy Ea 15 Min      26006 (CPT®) Hc Pt Iontophoresis Ea 15 Min      78419 (CPT®) Hc Pt Elec Stim Ea-Per 15 Min      89546 (CPT®) Hc Pt Ultrasound Ea 15 Min      46085 (CPT®) Hc Pt Self Care/Mgmt/Train Ea 15 Min      Total  15 1        Therapy Charges for Today     Code Description Service Date Service Provider Modifiers Qty    14928139031 HC PT THER PROC EA 15 MIN 7/14/2018 Lillian Vuong, PT GP 1          PT G-Codes  Outcome Measure Options: AM-PAC 6 Clicks Basic Mobility (PT)    Lillian Vuong, PT  7/14/2018

## 2018-07-14 NOTE — PLAN OF CARE
Problem: Fall Risk (Adult)  Goal: Absence of Fall  Outcome: Ongoing (interventions implemented as appropriate)   07/14/18 1403   Fall Risk (Adult)   Absence of Fall making progress toward outcome       Problem: Skin Injury Risk (Adult)  Goal: Skin Health and Integrity  Outcome: Ongoing (interventions implemented as appropriate)   07/14/18 1403   Skin Injury Risk (Adult)   Skin Health and Integrity making progress toward outcome       Problem: Infection, Risk/Actual (Adult)  Goal: Infection Prevention/Resolution  Outcome: Ongoing (interventions implemented as appropriate)   07/14/18 1403   Infection, Risk/Actual (Adult)   Infection Prevention/Resolution making progress toward outcome       Problem: Patient Care Overview  Goal: Plan of Care Review   07/14/18 1335 07/14/18 1403   Coping/Psychosocial   Plan of Care Reviewed With --  patient;family   Plan of Care Review   Progress --  improving   OTHER   Outcome Summary Pt participated very little with therex; quickly became agitated. Refused to sit EOB but did, at one point, sit up in the bed on her own (when agitated). Cognitive status is limiting her participation. --

## 2018-07-14 NOTE — NURSING NOTE
Palliative consult noted, chart reviewed, Palliative provider notified and will see for consult tomorrow morning.

## 2018-07-15 ENCOUNTER — APPOINTMENT (OUTPATIENT)
Dept: GENERAL RADIOLOGY | Facility: HOSPITAL | Age: 83
End: 2018-07-15

## 2018-07-15 LAB
BACTERIA SPEC AEROBE CULT: NORMAL
MAGNESIUM SERPL-MCNC: 2 MG/DL (ref 1.3–2.7)
POTASSIUM BLD-SCNC: 3.3 MMOL/L (ref 3.5–5.5)
POTASSIUM BLD-SCNC: 3.9 MMOL/L (ref 3.5–5.5)

## 2018-07-15 PROCEDURE — 83735 ASSAY OF MAGNESIUM: CPT | Performed by: FAMILY MEDICINE

## 2018-07-15 PROCEDURE — 25010000002 HEPARIN (PORCINE) PER 1000 UNITS: Performed by: PHYSICIAN ASSISTANT

## 2018-07-15 PROCEDURE — 71045 X-RAY EXAM CHEST 1 VIEW: CPT

## 2018-07-15 PROCEDURE — 99232 SBSQ HOSP IP/OBS MODERATE 35: CPT | Performed by: FAMILY MEDICINE

## 2018-07-15 PROCEDURE — 97535 SELF CARE MNGMENT TRAINING: CPT

## 2018-07-15 PROCEDURE — 84132 ASSAY OF SERUM POTASSIUM: CPT | Performed by: FAMILY MEDICINE

## 2018-07-15 PROCEDURE — 25010000003 POTASSIUM CHLORIDE 10 MEQ/100ML SOLUTION: Performed by: PHYSICIAN ASSISTANT

## 2018-07-15 RX ADMIN — HEPARIN SODIUM 5000 UNITS: 5000 INJECTION, SOLUTION INTRAVENOUS; SUBCUTANEOUS at 21:34

## 2018-07-15 RX ADMIN — POTASSIUM CHLORIDE 10 MEQ: 7.46 INJECTION, SOLUTION INTRAVENOUS at 02:02

## 2018-07-15 RX ADMIN — AMOXICILLIN AND CLAVULANATE POTASSIUM 500 MG: 500; 125 TABLET, FILM COATED ORAL at 09:25

## 2018-07-15 RX ADMIN — DOCUSATE SODIUM 100 MG: 100 CAPSULE, LIQUID FILLED ORAL at 09:25

## 2018-07-15 RX ADMIN — POTASSIUM CHLORIDE 10 MEQ: 7.46 INJECTION, SOLUTION INTRAVENOUS at 03:01

## 2018-07-15 RX ADMIN — POTASSIUM CHLORIDE 10 MEQ: 7.46 INJECTION, SOLUTION INTRAVENOUS at 04:00

## 2018-07-15 RX ADMIN — POTASSIUM CHLORIDE 10 MEQ: 7.46 INJECTION, SOLUTION INTRAVENOUS at 05:08

## 2018-07-15 RX ADMIN — HEPARIN SODIUM 5000 UNITS: 5000 INJECTION, SOLUTION INTRAVENOUS; SUBCUTANEOUS at 09:25

## 2018-07-15 NOTE — PLAN OF CARE
Problem: Fall Risk (Adult)  Goal: Absence of Fall  Outcome: Ongoing (interventions implemented as appropriate)   07/15/18 9673   Fall Risk (Adult)   Absence of Fall making progress toward outcome

## 2018-07-15 NOTE — PROGRESS NOTES
[facility] Medicine Services  PROGRESS NOTE    Patient Name: Arabella Gomes  : 1927  MRN: [id number]    Date of Admission: 7/10/2018  Length of Stay: 5  Primary Care Physician: No Known Provider    Subjective   Subjective     CC:  F/U AMS    HPI:  Still with wax/wane interactivity, easily agitated at times.  Did eat few bites of pancakes for breakfast but has slept since.  No nursing concerns or events relayed.     Review of Systems  Unable to obtain due to mental status    Otherwise ROS is negative except as mentioned in the HPI.    Objective   Objective     Vital Signs:   Temp:  [98.1 °F (36.7 °C)-98.5 °F (36.9 °C)] 98.1 °F (36.7 °C)  Heart Rate:  [73-77] 77  Resp:  [16-17] 16  BP: (137-151)/(66-81) 151/66        Physical Exam:  Gen-no acute distress, cachetic elderly frail female  CV-RRR, S1 S2 normal, no m/r/g  Resp-CTAB, no wheezes  Abd-soft, NT, ND, +BS  Ext-no edema  Neuro-lethargic, will open eyes briefly, no focal deficits      Results Reviewed:  I have personally reviewed current lab, radiology, and data and agree.      Results from last 7 days  Lab Units 18  0647 07/13/18  0633 07/12/18  0458  07/10/18  1223   WBC 10*3/mm3 6.82 5.49 5.33  < > 5.45   HEMOGLOBIN g/dL 9.2* 8.3* 7.9*  < > 9.0*   HEMATOCRIT % 27.8* 24.9* 24.4*  < > 27.5*   PLATELETS 10*3/mm3 124* 120* 107*  < > 146*   INR   --   --   --   --  1.07   < > = values in this interval not displayed.    Results from last 7 days  Lab Units 18  2353 188  07/10/18  1223   SODIUM mmol/L  --  147* 145 144  < > 141   POTASSIUM mmol/L 3.3* 3.2* 3.9 3.4*  < > 4.7   CHLORIDE mmol/L  --  112* 113* 116*  < > 105   CO2 mmol/L  --  26.0 24.0 19.0*  < > 26.0   BUN mg/dL  --  9 10 13  < > 20   CREATININE mg/dL  --  1.15 1.03 1.06  < > 1.58*   GLUCOSE mg/dL  --  98 90 66*  < > 101*   CALCIUM mg/dL  --  7.7* 7.7* 7.7*  < > 8.6*   ALT (SGPT) U/L  --   --   --   --   --  14   AST  (SGOT) U/L  --   --   --   --   --  25   < > = values in this interval not displayed.  Estimated Creatinine Clearance: 29.4 mL/min (by C-G formula based on SCr of 1.15 mg/dL).  No results found for: BNP    Microbiology Results Abnormal     Procedure Component Value - Date/Time    Blood Culture - Blood, [[id number]] Collected:  07/10/18 1240    Lab Status:  Preliminary result Specimen:  Blood from Arm, Left Updated:  07/14/18 1315     Blood Culture No growth at 4 days      Aerobic bottle only    Blood Culture - Blood, [[id number]]  (Normal) Collected:  07/10/18 1250    Lab Status:  Preliminary result Specimen:  Blood from Wrist, Left Updated:  07/14/18 1315     Blood Culture No growth at 4 days    Urine Culture - Urine, Urine, Catheter [[id number]]  (Abnormal) Collected:  07/10/18 1223    Lab Status:  Final result Specimen:  Urine from Urine, Catheter Updated:  07/13/18 1257     Urine Culture 20,000-30,000 CFU/mL Candida albicans (A)    Influenza Antigen, Rapid - Swab, Nasopharynx [[id number]]  (Normal) Collected:  07/10/18 1224    Lab Status:  Final result Specimen:  Swab from Nasopharynx Updated:  07/10/18 1247     Influenza A Ag, EIA Negative     Influenza B Ag, EIA Negative          Imaging Results (last 24 hours)     ** No results found for the last 24 hours. **             I have reviewed the medications.    Assessment/Plan   Assessment / Plan     Hospital Problem List     * (Principal)Sepsis (CMS/[facility])    Alzheimer's disease (Chronic)    Acute kidney injury (CMS/[facility])    CKD (chronic kidney disease) stage 3, GFR 30-59 ml/min    Acute cystitis with hematuria    Right lower lobe pneumonia (CMS/[facility])    Fever    Hypotension             Brief Hospital Course to date:  Arabella Gomes is a 89 y.o. female with PMH significant for Alzheimer's dementia and CKD III. Admitted to [facility] 6/16-6/20/18 with confusion, fevers/chills and cough. Treated for pneumonia. Respiratory PCR (+) rhinovirus. Discharged on Doxycycline. She returned  to [facility] ED 7/10/18 with AMS. Evaluation revealed sepsis secondary to UTI and RLL pneumonia.       Assessment & Plan:  --Blood cultures NGTD. Deescalated from Vanc/Zosyn to just Zosyn then transitioned to Augmentin to complete 7 day course. Urine culture with low yield yeast only and no urine sx's, will not treat.  --Temperature curve decreasing for past 48hrs, on RA, normal WBC.  Recheck CXR today to re-eval given ongoing lethargy.  --Replace K+, will give 1g IV Mg++ x1 to help hypokalemia (recent Mg++ borderline at 2.1)  --Continues with wax/wane lethargy and poor PO intake. No sedating meds have been administered.  Clinically her sepsis has resolved. As d/w family, suspect multiple hospital stays, her dementia, and advanced age contributes and she may be on her natural decline.  Goal is to attempt rehab portential at skilled rehab and transition back to her Memory Care unit if able in future but based on current status she may continue to slowly decline -->  Will consult Palliative Med to help with family decisions for her care and consider full comfort if she continues to decline.  --PT/OT following. Planning to go to SNF prior to returning to her memory care unit however may not be interactive enough for skilled rehab. CM to follow up.    DVT Prophylaxis:  [facility]    CODE STATUS:   Code Status and Medical Interventions:   Ordered at: 07/15/18 1006     Limited Support to NOT Include:    Artificial Nutrition    Intubation    Cardioversion/Defibrillation    NIPPV (Non-Invasive Positive Pressure Support)     Code Status:    No CPR     Medical Interventions (Level of Support Prior to Arrest):    Limited     Comments:    Living will reviewed       Disposition: I expect the patient to be discharged to possibly to SNF ~2 days depending on her clinical improvement and interactivity      Electronically signed by Jamia Couch MD, 07/15/18, 10:55 AM.

## 2018-07-15 NOTE — THERAPY TREATMENT NOTE
Acute Care - Speech Language Pathology   Swallow Treatment Note [facility]     Patient Name: Arabella Gomes  : 1927  MRN: [id number]  Today's Date: 7/15/2018  Onset of Illness/Injury or Date of Surgery: 07/10/18     Referring Physician: MD Fabián      Admit Date: 7/10/2018    Visit Dx:      ICD-10-CM ICD-9-CM   1. Altered mental status, unspecified altered mental status type R41.82 780.97   2. Sepsis, due to unspecified organism (CMS/HCC) A41.9 038.9     995.91   3. Urinary tract infection with hematuria, site unspecified N39.0 599.0    R31.9    4. Renal insufficiency N28.9 593.9   5. Anemia, unspecified type D64.9 285.9   6. Dysphagia, unspecified type R13.10 787.20   7. Impaired functional mobility, balance, gait, and endurance Z74.09 V49.89   8. Impaired mobility and ADLs Z74.09 799.89     Patient Active Problem List   Diagnosis   • Alzheimer's disease   • Low back pain   • Acute kidney injury (CMS/HCC)   • Chronic Macrocytic anemia   • Failure to thrive in adult, likely due to moderate-severe dementia   • Dysphagia   • Moderate protein-calorie malnutrition (CMS/HCC)   • CKD (chronic kidney disease) stage 3, GFR 30-59 ml/min   • Sepsis (CMS/HCC)   • Acute cystitis with hematuria   • Right lower lobe pneumonia (CMS/HCC)   • Fever   • Hypotension       Therapy Treatment    Therapy Treatment / Health Promotion    Treatment Time/Intention  Discipline: speech language pathologist (07/15/18 1210 : LESLEY Ortez)  Document Type: therapy note (daily note) (07/15/18 1210 : LESLEY Ortez)  Subjective Information: no complaints (Family reports pt not arousable) (07/15/18 1210 : LESLEY Ortez)  Mode of Treatment: speech-language pathology, individual therapy (07/15/18 1210 : LESLEY Ortez)  Patient/Family Observations:  (Brother and niece present) (07/15/18 1210 : LESLEY Ortez)  Care Plan Review: care plan/treatment goals reviewed  (07/15/18 1210 : LESLEY Ortez)  Therapy Frequency (Swallow): evaluation only, PRN (07/15/18 1210 : LESLEY Ortez)  Patient Effort: poor (07/15/18 1210 : LESLEY Ortez)  Comment: Lethargic, responds intermittently verbally but largely unintelligible (07/15/18 1210 : Kandy P Lankster, CCC-SLP)    Vitals/Pain/Safety  Pain Scale: FACES Pre/Post-Treatment  Pain: FACES Scale, Pretreatment: 2-->hurts little bit (07/15/18 1210 : LESLEY Ortez)    Cognition, Communication, Swallow  Recommendations  Therapy Frequency (Swallow): evaluation only, PRN (07/15/18 1210 : LESLEY Ortez)  Anticipated Dischage Disposition: unknown (07/15/18 1210 : LESLEY Ortez)    Outcome Summary  Outcome Summary/Treatment Plan (SLP)  Daily Summary of Progress (SLP):  (Poor progress towards goal of eating to sustain) (07/15/18 1210 : LESLEY Ortez)  Barriers to Overall Progress (SLP): Level of alertness is poor (07/15/18 1210 : LESLEY Ortez)  Plan for Continued Treatment (SLP): Continue per dysphgia plan (07/15/18 1210 : LESLEY Ortez)  Anticipated Dischage Disposition: unknown (07/15/18 1210 : LESLEY Ortez)            SLP GOALS     Row Name 07/15/18 1150 07/13/18 1020          Oral Nutrition/Hydration Goal 1 (SLP)    Oral Nutrition/Hydration Goal 1, SLP LTG: Pt will tolerate soft whole foods and thin liquids w/ 100% accuracy w/ min cues.   -ML LTG: Pt will tolerate soft whole foods and thin liquids w/ 100% accuracy w/ min cues.   -SM     Time Frame (Oral Nutrition/Hydration Goal 1, SLP) by discharge  -ML by discharge  -SM     Barriers (Oral Nutrition/Hydration Goal 1, SLP) Level of alertness/dementia-cognitive deficits  -ML  --     Progress/Outcomes (Oral Nutrition/Hydration Goal 1, SLP) progress slower than expected   Unable to maintain alertness for safe po  -ML continuing progress toward goal   -SM        Swallow Management Recall Goal (SLP)    Swallow Management Recall Goal (SLP) Pt will tolerate Nectar-thick and level 4 dys diet (no straws) w/ no overt s/s of aspiration or complications w/ 100% accuracy w/ min cues and assist  -ML Pt will tolerate Nectar-thick and level 4 dys diet (no straws) w/ no overt s/s of aspiration or complications w/ 100% accuracy w/ min cues and assist  -SM     Time Frame (Swallow Management Recall Goal, SLP) short term goal (STG);by discharge  -ML short term goal (STG);by discharge  -SM     Barriers (Swallow Management Recall Goal, SLP)  -- Per niece, did well with dinner last night - good intake and no s/s aspiration. this morning, pt very fatigued. Not alert enough for intake of breakfast, had some NT water off spoon without issue. Niece with good understanding of all. Carlyle continue as is for now, knowing may adjust based on any improvements or decline in pt.   -SM     Progress/Outcomes (Swallow Management Recall Goal, SLP) progress slower than expected   Opens mouth but does not retrieve material.  All PO suctione  -ML continuing progress toward goal  -SM        Swallow Compensatory Strategies Goal (SLP)    Swallow Compensatory Strategies/Techniques Goal (SLP) Pt will tolerate trials of thin liquids w/ no overt s/s of aspiration and complications w/ 80% accuracy w/ min cues.   -ML Pt will tolerate trials of thin liquids w/ no overt s/s of aspiration and complications w/ 80% accuracy w/ min cues.   -SM     Time Frame (Swallow Compensatory Strategies/Techniques Goal, SLP) short term goal (STG);by discharge  -ML short term goal (STG);by discharge  -SM     Barriers (Swallow Compensatory Strategies/Techniques Goal, SLP) Too lethargic to address  -ML Too lethargic to address  -SM     Progress/Outcomes (Swallow Compensatory Strategies/Techniques Goal, SLP) goal ongoing   Eduction completed with family re: safe swallow guidelines  -ML goal ongoing  -SM       User Key  (r) = Recorded  By, (t) = Taken By, (c) = Cosigned By    Initials Name Provider Type     Magdalena Monreal, MS CCC-SLP Speech and Language Pathologist    SAVANNAH Kumar CCC-SLP Speech and Language Pathologist          EDUCATION  The patient has been educated in the following areas:   Dysphagia (Swallowing Impairment) Oral Care/Hydration.    SLP Recommendation and Plan                       Anticipated Dischage Disposition: unknown     Therapy Frequency (Swallow): evaluation only, PRN             Daily Summary of Progress (SLP):  (Poor progress towards goal of eating to sustain)  Plan for Continued Treatment (SLP): Continue per dysphgia plan  Outcome Summary:  (Eduction re: safe swallow guidelines/oral care/sips of water)           Time Calculation:         Time Calculation- SLP     Row Name 07/15/18 1319             Time Calculation- SLP    SLP Start Time 1150  -ML      SLP Received On 07/15/18  -ML        User Key  (r) = Recorded By, (t) = Taken By, (c) = Cosigned By    Initials Name Provider Type     Kandy Kumar CCC-SLP Speech and Language Pathologist          Therapy Charges for Today     Code Description Service Date Service Provider Modifiers Qty    [id number]  ST SELF CARE/MGMT/TRAIN EA 15 MIN 7/15/2018 LESLEY Ortez GN 3                 LESLEY Ortez  7/15/2018

## 2018-07-15 NOTE — PLAN OF CARE
Problem: Fall Risk (Adult)  Goal: Absence of Fall  Outcome: Ongoing (interventions implemented as appropriate)   07/15/18 0504   Fall Risk (Adult)   Absence of Fall making progress toward outcome       Problem: Skin Injury Risk (Adult)  Goal: Skin Health and Integrity  Outcome: Ongoing (interventions implemented as appropriate)   07/15/18 0504   Skin Injury Risk (Adult)   Skin Health and Integrity making progress toward outcome       Problem: Infection, Risk/Actual (Adult)  Goal: Infection Prevention/Resolution  Outcome: Ongoing (interventions implemented as appropriate)   07/15/18 0504   Infection, Risk/Actual (Adult)   Infection Prevention/Resolution making progress toward outcome       Problem: Patient Care Overview  Goal: Plan of Care Review  Outcome: Ongoing (interventions implemented as appropriate)   07/15/18 0504   Plan of Care Review   Progress no change   OTHER   Outcome Summary Pt answered simple questions. Slept most of the shift but arouses easily. Purewick discontinued due to loose stool at the start of shift. Potassium was 3.3 after earlier replacement. Pt refused PO replacement . IV replacem in progress. VSS.

## 2018-07-15 NOTE — CONSULTS
"Palliative Care Initial Consult Note    Patient Name:  Arabella Gomes   : 1927   Sex: female    Patient Care Team:  No Known Provider as PCP - General  Alan Verde MD as Consulting Physician (Neurology)  Jose Rafael Aiken MD (Internal Medicine)    Advance care planning discussed: yes  Code Status: Conditional - no cpr, no intubation, no bipap  Advance Directive: Yes, scanned into Epic  Surrogate decision maker: JustinaerToby    Referring Provider: Dr. Couch  Reason for Consult: [facility]    Subjective     Patient is a 89 y.o. female who presented to ED from a memory care assisted living facility on 7/10/18 for AMS and lethargy. Treating for sepsis, RLL with abx (Vanc, Zosyn changed to Augmentin ). Blood cultures with no growth to date. Continues with potassium replacement and IV fluid for dehydration.    Recently admitted 2018 for sepsis, treated for rhinovirus and discharged on doxycycline.     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 Has previously graduated from Hospice. At this time, pt denies any complaints of pain or shortness of breath. Appetite remains poor; has dysphagia diet. Gets agitated with working with PT.     Patient's response when asked, \"are you uncomfortable because of pain?\": No    Review of Systems  Review of Systems   Unable to perform ROS: Dementia   Constitutional: Positive for activity change, appetite change and fatigue.        Pt denies any complaints at this time.   HENT: Trouble swallowing: ?    Respiratory: Negative for shortness of breath.    Gastrointestinal: Negative for abdominal pain.   Musculoskeletal: Positive for gait problem. Negative for back pain.   Psychiatric/Behavioral: Positive for agitation and confusion.       History  Past Medical History:   Diagnosis Date   • Anemia of chronic disease    • Anxiety    • CKD (chronic " kidney disease) stage 3, GFR 30-59 ml/min    • Dementia    • LBBB (left bundle branch block)      Past Surgical History:   Procedure Laterality Date   • NO PAST SURGERIES       Current Facility-Administered Medications   Medication Dose Route Frequency Provider Last Rate Last Dose   • acetaminophen (TYLENOL) tablet 650 mg  650 mg Oral Q4H PRN Kathie Gutierrez PA-C   650 mg at 07/11/18 2010   • amoxicillin-clavulanate (AUGMENTIN) 500-125 MG per tablet 500 mg  1 tablet Oral Q12H Sarah MOORE MD   500 mg at 07/15/18 0925   • docusate sodium (COLACE) capsule 100 mg  100 mg Oral BID Kathie Gutierrez PA-C   100 mg at 07/15/18 0925   • heparin (porcine) 5000 UNIT/ML injection 5,000 Units  5,000 Units Subcutaneous Q12H Kathie Gutierrez PA-C   5,000 Units at 07/15/18 0925   • potassium chloride (MICRO-K) CR capsule 40 mEq  40 mEq Oral PRN Kathie Gutierrez PA-C   40 mEq at 07/14/18 0939    Or   • potassium chloride (KLOR-CON) packet 40 mEq  40 mEq Oral PRN Kathie Gutierrez PA-C   40 mEq at 07/14/18 1740    Or   • potassium chloride 10 mEq in 100 mL IVPB  10 mEq Intravenous Q1H PRN Kathie Gutierrez PA-C 100 mL/hr at 07/15/18 0508 10 mEq at 07/15/18 0508   • sodium chloride 0.9 % bolus 1,000 mL  1,000 mL Intravenous Once Kathie Gutierrez PA-C       • sodium chloride 0.9 % flush 1-10 mL  1-10 mL Intravenous PRN Kathie Gutierrez PA-C       • sodium chloride 0.9 % flush 10 mL  10 mL Intravenous PRN Elvis Orozco MD            •  acetaminophen  •  potassium chloride **OR** potassium chloride **OR** potassium chloride  •  sodium chloride  •  Insert peripheral IV **AND** sodium chloride  No Known Allergies  Family History   Problem Relation Age of Onset   • Heart failure Mother    • Stroke Father    • Heart disease Neg Hx    • Lung disease Neg Hx      Social History     Social History   • Marital status: Single     Spouse name: N/A   • Number of children: 0   • Years of education:  College     Occupational History   •  Retired     Social History Main Topics   • Smoking status: Never Smoker   • Smokeless tobacco: Never Used   • Alcohol use No   • Drug use: No   • Sexual activity: No     Other Topics Concern   • Not on file     Social History Narrative    ** Merged History Encounter **            Objective     Vital Signs  Temp:  [98.1 °F (36.7 °C)-98.5 °F (36.9 °C)] 98.1 °F (36.7 °C)  Heart Rate:  [73-77] 77  Resp:  [16-17] 16  BP: (137-151)/(66-81) 151/66      PPS: Palliative Performance Scale score as of 7/15/2018, 11:21 AM is 40% based on the following measures:   Ambulation: Mainly in bed  Activity and Evidence of Disease: Unable to do any work, extensive evidence of disease  Self-Care: Mainly assistance  Intake: Normal or reduced   LOC: Full, drowsy or confusion      Physical Exam   Constitutional: She appears lethargic. No distress.   Elderly, frail   HENT:   + mild temporal wasting   Eyes:   Eyes remained closed but did open briefly, family placed glasses on her, after a moment she closed her eyes and kept them closed.   Neck: No JVD present.   Cardiovascular: Normal rate and regular rhythm.    Pulmonary/Chest: Effort normal and breath sounds normal. No respiratory distress.   Abdominal: Soft. Bowel sounds are normal.   Musculoskeletal: She exhibits no edema.   Neurological: She appears lethargic.   symm facial features. Can squeeze a hand but otherwise limited in ability to follow commands   Skin: Skin is dry. She is not diaphoretic. There is pallor.   Psychiatric:   No facial grimacing. Appears comfortable.       Results Review:   No results found for: HGBA1C    Lab Results   Component Value Date    GLUCOSE 98 07/14/2018    BUN 9 07/14/2018    CREATININE 1.15 07/14/2018    EGFRIFNONA 44 (L) 07/14/2018    EGFRIFAFRI 58 (L) 06/16/2018    BCR 7.8 07/14/2018    K 3.9 07/15/2018    CO2 26.0 07/14/2018    CALCIUM 7.7 (L) 07/14/2018    ALBUMIN 3.72 07/10/2018    LABIL2 1.3 (L)  07/10/2018    AST 25 07/10/2018    ALT 14 07/10/2018       WBC   Date Value Ref Range Status   07/14/2018 6.82 3.50 - 10.80 10*3/mm3 Final     RBC   Date Value Ref Range Status   07/14/2018 2.63 (L) 3.89 - 5.14 10*6/mm3 Final     Hemoglobin   Date Value Ref Range Status   07/14/2018 9.2 (L) 11.5 - 15.5 g/dL Final     Hematocrit   Date Value Ref Range Status   07/14/2018 27.8 (L) 34.5 - 44.0 % Final     MCV   Date Value Ref Range Status   07/14/2018 105.7 (H) 80.0 - 99.0 fL Final     MCH   Date Value Ref Range Status   07/14/2018 35.0 (H) 27.0 - 31.0 pg Final     MCHC   Date Value Ref Range Status   07/14/2018 33.1 32.0 - 36.0 g/dL Final     RDW   Date Value Ref Range Status   07/14/2018 17.0 (H) 11.3 - 14.5 % Final     RDW-SD   Date Value Ref Range Status   07/14/2018 64.9 (H) 37.0 - 54.0 fl Final     MPV   Date Value Ref Range Status   07/14/2018 12.5 (H) 6.0 - 12.0 fL Final     Platelets   Date Value Ref Range Status   07/14/2018 124 (L) 150 - 450 10*3/mm3 Final       Principal Problem:    Sepsis (CMS/[facility])  Active Problems:    Alzheimer's disease    Acute kidney injury (CMS/[facility])    CKD (chronic kidney disease) stage 3, GFR 30-59 ml/min    Acute cystitis with hematuria    Right lower lobe pneumonia (CMS/[facility])    Fever    Hypotension      Assessment/Plan   Assessment/Plan:     Agitation - most apparent when doing tasks (getting out of bed) that she does not want to do. Has not required scheduled or prns for mgmt.    Dysphagia - on mechanical soft, nectar thick diet.    Anorexia - continue pt directed eating/drinking    Somnolence - infectious vs decline. Appears to be more of a natural inevitable decline given pt's AD, age, immobility, readmission, and anorexia.    Goals of Care - Pt is a DNR/DNI - Code status updated in Epic to reflect conversation with family. Continue abx, labs at this time. Plan is to see how pt does today/overnight. Family is interested in discussing a more comfort based plan of care. Will place  a Hospice consult for tomorrow to discuss discharging to a facility with Hospice. Pt at this time does not meet criteria for inpt Hospice but possibly could with a decline.     Total Visit Time: 55min  Face to Face Time: 30 min    TERESA Liang  (C) [phone]  (O) [phone]  07/15/18  11:14 AM

## 2018-07-15 NOTE — PLAN OF CARE
Problem: Skin Injury Risk (Adult)  Intervention: Promote/Optimize Nutrition   07/15/18 1647   Nutrition Interventions   Oral Nutrition Promotion calorie dense liquids provided;physical activity promoted;safe use of adaptive equipment encouraged;social interaction promoted;rest periods promoted       Goal: Skin Health and Integrity  Outcome: Ongoing (interventions implemented as appropriate)  Repositioning patient and applying barrier cream to buttocks   07/15/18 1647   Skin Injury Risk (Adult)   Skin Health and Integrity making progress toward outcome

## 2018-07-15 NOTE — PLAN OF CARE
Problem: Patient Care Overview  Goal: Plan of Care Review  Outcome: Ongoing (interventions implemented as appropriate)   07/15/18 0600   OTHER   Outcome Summary (Eduction re: safe swallow guidelines/oral care/sips of water)   SLP treatment completed. Will continue to address Dysphagia/oral care education. Please see note for further details and recommendations.

## 2018-07-15 NOTE — PLAN OF CARE
Problem: Patient Care Overview  Goal: Interprofessional Rounds/Family Conf  Outcome: Ongoing (interventions implemented as appropriate)  Palliative Team Conference: JUSTIN Mijares RN, CHPN; TERESA Calle   07/15/18 1237   Interdisciplinary Rounds/Family Conf   Summary Palliative consulted for comfort care, support for patient/family. Limited interventions clarified with pt's brother and niece, present at encounter; both verbalized interest in hospice information, consult placed. If pt improves to be able to participate in therapy, family would like rehab at discharge with plan to return to [facility]/memory care when complete. Monitor progress with current POC, adjust plan accordingly.       Problem: Palliative Care (Adult)  Intervention: Promote Informed Decision Making and Goal Setting   07/15/18 1237   Coping/Psychosocial Interventions   Life Transition/Adjustment palliative care discussed;palliative care initiated     Intervention: Support/Optimize Psychosocial Response   07/15/18 1237   Psychosocial Support   Family/Support System Care involvement promoted;presence promoted;self-care encouraged;support provided       Goal: Identify Related Risk Factors and Signs and Symptoms  Outcome: Ongoing (interventions implemented as appropriate)   07/15/18 1237   Palliative Care (Adult)   Palliative Care: Related Risk Factors advanced age;chronic illness;condition is progressive;terminal illness;other (see comments)  (increasing frequency of admissions for infection)   Palliative Care: Signs and Symptoms constipation;other (see comments)  (confusion)

## 2018-07-15 NOTE — PLAN OF CARE
Problem: Patient Care Overview  Goal: Plan of Care Review  Outcome: Ongoing (interventions implemented as appropriate)   07/15/18 1649   Coping/Psychosocial   Plan of Care Reviewed With patient;daughter;son   Plan of Care Review   Progress no change   OTHER   Outcome Summary Family present for most of afternoon, late morning to discuss with Palliative patient's wishes and review Living Will. Speech therapy here to evaluate patient's swallow ability which continues to be weak, at times leaving entire bites of food uneaten pooling in mouth. Pushing out PO meds today even when given in applesauce.     Goal: Individualization and Mutuality  Outcome: Ongoing (interventions implemented as appropriate)   07/15/18 1649   Individualization   Patient Specific Preferences continue to provide reassurance with bathroom needs, patient very modest   Patient Specific Goals (Include Timeframe) patient will attempt to assist in ADL's, eat at least 25% of each meal   Patient Specific Interventions encourage any effort to participate in ADL's     Goal: Discharge Needs Assessment  Outcome: Ongoing (interventions implemented as appropriate)   07/15/18 1649   Discharge Needs Assessment   Patient/Family Anticipates Transition to inpatient hospice

## 2018-07-16 PROCEDURE — 97530 THERAPEUTIC ACTIVITIES: CPT

## 2018-07-16 PROCEDURE — 99232 SBSQ HOSP IP/OBS MODERATE 35: CPT | Performed by: FAMILY MEDICINE

## 2018-07-16 PROCEDURE — 92526 ORAL FUNCTION THERAPY: CPT

## 2018-07-16 PROCEDURE — 25010000002 HEPARIN (PORCINE) PER 1000 UNITS: Performed by: PHYSICIAN ASSISTANT

## 2018-07-16 PROCEDURE — 97535 SELF CARE MNGMENT TRAINING: CPT

## 2018-07-16 RX ADMIN — HEPARIN SODIUM 5000 UNITS: 5000 INJECTION, SOLUTION INTRAVENOUS; SUBCUTANEOUS at 20:31

## 2018-07-16 RX ADMIN — HEPARIN SODIUM 5000 UNITS: 5000 INJECTION, SOLUTION INTRAVENOUS; SUBCUTANEOUS at 08:10

## 2018-07-16 NOTE — PLAN OF CARE
Problem: Patient Care Overview  Goal: Plan of Care Review  Outcome: Ongoing (interventions implemented as appropriate)   07/16/18 2953   Coping/Psychosocial   Plan of Care Reviewed With patient   Plan of Care Review   Progress improving   OTHER   Outcome Summary Pt more alert and with improved participation this date. Pt requires modA for supine to sit and maxAx2 for rolling. Pt required Claudia for sit to stand. Pt with urinary incontience. Continue IP OT per POC.

## 2018-07-16 NOTE — PROGRESS NOTES
"                  Clinical Nutrition     Nutrition Assessment  Reason for Visit:   Follow-up protocol      Patient Name: Arabella Gomes  YOB: 1927  MRN: [id number]  Date of Encounter: 07/16/18 2:56 PM  Admission date: 7/10/2018    Nutrition Assessment   Assessment       Hospital Problem List  Principal Problem:    Sepsis (CMS/HCC)  Active Problems:    Alzheimer's disease    Acute kidney injury (CMS/HCC)    CKD (chronic kidney disease) stage 3, GFR 30-59 ml/min    Acute cystitis with hematuria    Right lower lobe pneumonia (CMS/HCC)    Fever    Hypotension      PMH: She  has a past medical history of Anemia of chronic disease; Anxiety; CKD (chronic kidney disease) stage 3, GFR 30-59 ml/min; Dementia; and LBBB (left bundle branch block).   PSxH: She  has a past surgical history that includes No past surgeries.     Reported/Observed/Food/Nutrition Related History:     Pt resting during time of visit. Per niece at bedside pt has been eating a little bit more food than before, however is now spitting out a lot of the food she tries. Niece at bedside reported that the pt is doing very well with her current diet and thickened liquids, reported that she has been pushing the nutritional supplements.       Anthropometrics     Height: 162.6 cm (64\")  Last filed wt: Weight: 58.5 kg (129 lb) (07/10/18 1217)  Weight Method: Estimated    BMI: BMI (Calculated): 22.1  Normal: 18.5-24.9kg/m2    Ideal Body Weight (IBW) (kg): 55      Labs reviewed       Results from last 7 days  Lab Units 07/15/18  0436 07/14/18  2353 07/14/18  0647 07/13/18  0633 07/12/18  0458  07/10/18  1223   GLUCOSE mg/dL  --   --  98 90 66*  < > 101*   BUN mg/dL  --   --  9 10 13  < > 20   CREATININE mg/dL  --   --  1.15 1.03 1.06  < > 1.58*   SODIUM mmol/L  --   --  147* 145 144  < > 141   CHLORIDE mmol/L  --   --  112* 113* 116*  < > 105   POTASSIUM mmol/L 3.9 3.3* 3.2* 3.9 3.4*  < > 4.7   MAGNESIUM mg/dL 2.0  --   --   --   --   --  2.1   ALT " (SGPT) U/L  --   --   --   --   --   --  14   < > = values in this interval not displayed.    Results from last 7 days  Lab Units 07/10/18  1223   ALBUMIN g/dL 3.72             No results found for: HGBA1C      Intake/Ouptut 24 hrs (7:00AM - 6:59 AM)     Intake & Output (last day)       07/15 0701 - 07/16 0700 07/16 0701 - 07/17 0700    P.O. 220 60    IV Piggyback      Total Intake(mL/kg) 220 (3.8) 60 (1)    Urine (mL/kg/hr)      Stool      Total Output        Net +220 +60          Unmeasured Urine Occurrence 4 x 2 x    Unmeasured Stool Occurrence 1 x           Current Nutrition Prescription     PO: Diet Dysphagia; IV - Mechanical Soft No Mixed Consistencies; Nectar / Syrup Thick; No Straws; Vegetarian; Lacto-Ovo: Allows Dairy Products & Eggs    Active Supplement Orders      Dietary Nutrition Supplements Boost Pudding daily with lunch    Intake:  Per charting pt consuming 17% of 3 meals (7/15/18-7/16/18)         Nutrition Diagnosis       Problem Inadequate oral intake   Etiology Clinical condition   Signs/Symptoms PO intake 17% of 3 meals          Nutrition Intervention   1.  Follow treatment progress, Care plan reviewed, Advise alternate selection, Advised available snacks, Interview for preferences, Menu provided, Adjusted supplement, Encourage intake, Supplement provided    Will adjust supplements to boost pudding BID with breakfast and lunch  Will order magic cup daily BID with lunch and dinner  Provided pt with Dysphagia IV menu   Took meal preferences for the next 3 meals for pt    Goal:   General: Nutrition support treatment  PO: Increase intake  Additional goals:      Monitoring/Evaluation:   Per protocol, PO intake, Supplement intake      Will Continue to follow per protocol      Trupti Khan  Time Spent: 30 minutes

## 2018-07-16 NOTE — PROGRESS NOTES
Continued Stay Note  [facility]     Patient Name: Arabella Gomes  MRN: [id number]  Today's Date: 7/16/2018    Admit Date: 7/10/2018          Discharge Plan     Row Name 07/16/18 0948       Plan    Plan Comments Cm is following for discharge needs. Waiting for hospice eval.              Discharge Codes    No documentation.       Expected Discharge Date and Time     Expected Discharge Date Expected Discharge Time    Jul 16, 2018             Glo Davis RN

## 2018-07-16 NOTE — PLAN OF CARE
Problem: Patient Care Overview  Goal: Plan of Care Review  Outcome: Ongoing (interventions implemented as appropriate)   07/16/18 1700   Coping/Psychosocial   Plan of Care Reviewed With patient;family     SLP treatment completed. Will follow-up for further assessment/edu, pending POC. More alert today, but exhibiting overt clinical s/sxs aspiration even w/ pudding. Family not sure if they wish to pursue safest option (NPO, no feeding tubes per living will) vs comfort option (soft diet & thin or nectar-thick liquids). Swallow prognosis guarded. Please see note for further details and recommendations.

## 2018-07-16 NOTE — PROGRESS NOTES
Continued Stay Note  [facility]     Patient Name: Arabella Gomes  MRN: [id number]  Today's Date: 7/16/2018    Admit Date: 7/10/2018          Discharge Plan     Row Name 07/16/18 7067       Plan    Plan Per RN Case Manager    Plan Comments Hospice consult received.  Chart reviewed.  Admitted with fever, change in mental status, sepsis and ?RLL pneumonia.  Attempted visit multiple times - no family present or receiving therapies.  Met with niece, Melia Garland.  Discussed current status and goals of care.  Explained hospice services in the nursing facility.  Has been a hospice pt in the past - improved to the point of being ineligible for hospice services and was discharged. Per niece, father/family considering SNF for rehab vs hospice services in the NH.  Her father, Toby Gomes, is decision-maker.  Will return to [facility] in AM.  Plan to meet with brother to discuss d/c options with hospice services.  If can be of further assist please contact......ext [phone].              Discharge Codes    No documentation.       Expected Discharge Date and Time     Expected Discharge Date Expected Discharge Time    Jul 16, 2018             Deborah Medrano RN

## 2018-07-16 NOTE — PLAN OF CARE
Problem: Patient Care Overview  Goal: Interprofessional Rounds/Family Conf  Outcome: Ongoing (interventions implemented as appropriate)  13:00 Palliative Team Conference: JUSTIN Mijares RN, CHPN; BRET Medrano, CHPN; ROMULO Hernandez DO; CAN Seals, APRN   07/16/18 6891   Interdisciplinary Rounds/Family Conf   Summary Hospice consult in place, Liaison to meet with family. Pt less alert today per niece report. Family hoping pt will progress to skilled nursing/rehab. Palliative following for continuing GOC.

## 2018-07-16 NOTE — PLAN OF CARE
Problem: Fall Risk (Adult)  Goal: Absence of Fall  Outcome: Ongoing (interventions implemented as appropriate)   07/16/18 0421   Fall Risk (Adult)   Absence of Fall making progress toward outcome       Problem: Skin Injury Risk (Adult)  Goal: Skin Health and Integrity  Outcome: Ongoing (interventions implemented as appropriate)   07/16/18 0421   Skin Injury Risk (Adult)   Skin Health and Integrity making progress toward outcome       Problem: Patient Care Overview  Goal: Plan of Care Review  Outcome: Ongoing (interventions implemented as appropriate)   07/16/18 0421   Coping/Psychosocial   Plan of Care Reviewed With patient   OTHER   Outcome Summary Pt had refused PO meds at [facility]. Rested most of the night. Sometimes easily aroused and converses.        Problem: Infection, Risk/Actual (Adult)  Goal: Infection Prevention/Resolution  Outcome: Ongoing (interventions implemented as appropriate)   07/16/18 0421   Infection, Risk/Actual (Adult)   Infection Prevention/Resolution making progress toward outcome

## 2018-07-16 NOTE — THERAPY TREATMENT NOTE
Acute Care - Speech Language Pathology   Swallow Treatment Note [facility]     Patient Name: Arabella Gomes  : 1927  MRN: [id number]  Today's Date: 2018  Onset of Illness/Injury or Date of Surgery: 07/10/18     Referring Physician: MD Fabián      Admit Date: 7/10/2018    Visit Dx:      ICD-10-CM ICD-9-CM   1. Altered mental status, unspecified altered mental status type R41.82 780.97   2. Sepsis, due to unspecified organism (CMS/HCC) A41.9 038.9     995.91   3. Urinary tract infection with hematuria, site unspecified N39.0 599.0    R31.9    4. Renal insufficiency N28.9 593.9   5. Anemia, unspecified type D64.9 285.9   6. Dysphagia, unspecified type R13.10 787.20   7. Impaired functional mobility, balance, gait, and endurance Z74.09 V49.89   8. Impaired mobility and ADLs Z74.09 799.89     Patient Active Problem List   Diagnosis   • Alzheimer's disease   • Low back pain   • Acute kidney injury (CMS/HCC)   • Chronic Macrocytic anemia   • Failure to thrive in adult, likely due to moderate-severe dementia   • Dysphagia   • Moderate protein-calorie malnutrition (CMS/HCC)   • CKD (chronic kidney disease) stage 3, GFR 30-59 ml/min   • Sepsis (CMS/HCC)   • Acute cystitis with hematuria   • Right lower lobe pneumonia (CMS/HCC)   • Fever   • Hypotension       Therapy Treatment    Therapy Treatment / Health Promotion    Treatment Time/Intention  Discipline: speech language pathologist (18 1600 : Elissa Henry MS CCC-SLP)  Document Type: therapy note (daily note) (18 1600 : Elissa Henry MS CCC-SLP)  Subjective Information: complains of, weakness, fatigue (18 1600 : Elissa Henry MS CCC-SLP)  Patient/Family Observations: Pt alert to stimuli, cooperative w/ cues, confused. Pt's niece & brother (POA) at bedside. Pt's niece had just spoken w/ Hospice RN. Unsure if wish to pursue Hospice vs rehab.  (18 1600 : Elissa Henry MS CCC-SLP)  Care Plan Review: evaluation/treatment results  reviewed, care plan/treatment goals reviewed, risks/benefits reviewed, current/potential barriers reviewed, patient/other agree to care plan (07/16/18 1600 : Elissa Henry MS CCC-SLP)  Care Plan Review, Other Participant(s): family, sibling (07/16/18 1600 : Elissa Henry MS CCC-SLP)  Therapy Frequency (Swallow): PRN (07/16/18 1600 : Elissa Henry MS CCC-SLP)  Patient Effort: adequate (07/16/18 1600 : Elissa Henry MS CCC-SLP)  Plan of Care Review  Plan of Care Reviewed With: patient, family (07/16/18 1700 : Elissa Henry MS CCC-SLP)    Vitals/Pain/Safety  Pain Assessment  Additional Documentation: Pain Scale: FACES Pre/Post-Treatment (Group) (07/16/18 1600 : Elissa Henry MS CCC-SLP)  Pain Scale: FACES Pre/Post-Treatment  Pain: FACES Scale, Pretreatment: 0-->no hurt (07/16/18 1600 : Elissa Henry MS CCC-SLP)  Pain: FACES Scale, Post-Treatment: 0-->no hurt (07/16/18 1600 : Elissa Henry MS CCC-SLP)    Cognition, Communication, Swallow  Recommendations  Therapy Frequency (Swallow): PRN (07/16/18 1600 : Elissa Henry MS CCC-SLP)  Anticipated Dischage Disposition: unknown (07/16/18 1600 : Elissa Henry MS CCC-SLP)    Outcome Summary  Outcome Summary/Treatment Plan (SLP)  Daily Summary of Progress (SLP): unable to show any progress toward functional goals (07/16/18 1600 : Elissa Henry MS CCC-SLP)  Barriers to Overall Progress (SLP): Guarded prognosis. Cognitive deficits, difficulty following commands. (07/16/18 1600 : Elissa Henry MS CCC-SLP)  Plan for Continued Treatment (SLP): Had long discussion w/ pt's niece & brother. Pt more alert today, but exhibiting overt clinical s/sxs aspiration w/ ice cream & pudding. Pt u/a to initiate swallow w/ nectar-thick liquids. Discussed s/sxs aspiration, risks of aspiration, dysphagia, comfort vs safety, and oral care. Pt's family not sure if wish to pursue Hospice or rehab - still discussing - and not sure if wish to pursue safest PO option (NPO, no feeding tube per  living will) or comfort option (soft diet, thin or nectar-thick liquid). Paged Dr. Couch & notified RN. SLP will f/u for further edu, pending POC. (07/16/18 1600 : Elissa Henry, MS CCC-SLP)  Anticipated Dischage Disposition: unknown (07/16/18 1600 : Elissa Henry, MS CCC-SLP)  Patient/Family Concerns, Anticipated Discharge Disposition (SLP): Family still discussing POC - rehab vs Hospice. (07/16/18 1600 : Elissa Henry, MS CCC-SLP)            SLP GOALS     Row Name 07/16/18 1600 07/15/18 1150          Oral Nutrition/Hydration Goal 1 (SLP)    Oral Nutrition/Hydration Goal 1, SLP LTG: Pt will tolerate soft whole foods and thin liquids w/ 100% accuracy w/ min cues.   -AC LTG: Pt will tolerate soft whole foods and thin liquids w/ 100% accuracy w/ min cues.   -ML     Time Frame (Oral Nutrition/Hydration Goal 1, SLP) by discharge  -AC by discharge  -ML     Barriers (Oral Nutrition/Hydration Goal 1, SLP)  -- Level of alertness/dementia-cognitive deficits  -ML     Progress/Outcomes (Oral Nutrition/Hydration Goal 1, SLP) goal ongoing;other (see comments)   may revise goal pending POC decisions  -AC progress slower than expected   Unable to maintain alertness for safe po  -ML        Swallow Management Recall Goal (SLP)    Swallow Management Recall Goal (SLP) Pt will tolerate Nectar-thick and level 4 dys diet (no straws) w/ no overt s/s of aspiration or complications w/ 100% accuracy w/ min cues and assist  -AC Pt will tolerate Nectar-thick and level 4 dys diet (no straws) w/ no overt s/s of aspiration or complications w/ 100% accuracy w/ min cues and assist  -ML     Time Frame (Swallow Management Recall Goal, SLP) short term goal (STG);by discharge  -AC short term goal (STG);by discharge  -ML     Barriers (Swallow Management Recall Goal, SLP) Pt has had poor PO intake over last couple days 2' lethargy & oral deficits r/t dementia. Oral holding noted w/ all consistencies. Pt u/a to initiate swallow w/ nectar-thick liquids &  oral suctioning was required to remove bolus from oral cavity. Pt able to initiate swallow w/ 1/2 tsp ice cream and 1/2 tsp pudding, but exhibiting immediate & overt s/sxs aspiration (coughing).  -AC  --     Progress/Outcomes (Swallow Management Recall Goal, SLP) unable to make needed progress;other (see comments)   may revise goal pending POC decisions.  -AC progress slower than expected   Opens mouth but does not retrieve material.  All PO suctione  -ML        Swallow Compensatory Strategies Goal (SLP)    Swallow Compensatory Strategies/Techniques Goal (SLP) Pt will tolerate trials of thin liquids w/ no overt s/s of aspiration and complications w/ 80% accuracy w/ min cues.   -AC Pt will tolerate trials of thin liquids w/ no overt s/s of aspiration and complications w/ 80% accuracy w/ min cues.   -ML     Time Frame (Swallow Compensatory Strategies/Techniques Goal, SLP) short term goal (STG);by discharge  -AC short term goal (STG);by discharge  -ML     Barriers (Swallow Compensatory Strategies/Techniques Goal, SLP) 0% accuracy. Pt coughing w/ ice cream trial.  -AC Too lethargic to address  -ML     Progress/Outcomes (Swallow Compensatory Strategies/Techniques Goal, SLP) progress slower than expected;other (see comments)   may revise goal pending POC decisions  -AC goal ongoing   Eduction completed with family re: safe swallow guidelines  -ML       User Key  (r) = Recorded By, (t) = Taken By, (c) = Cosigned By    Initials Name Provider Type    AC Elissa Henry, MS CCC-SLP Speech and Language Pathologist    SAVANNAH Kumar CCC-SLP Speech and Language Pathologist          EDUCATION  The patient has been educated in the following areas:   Dysphagia (Swallowing Impairment) Oral Care/Hydration Modified Diet Instruction.    SLP Recommendation and Plan   Anticipated Dischage Disposition: unknown     Therapy Frequency (Swallow): PRN    Plan of Care Reviewed With: patient, family  Plan of Care Review  Plan of Care  Reviewed With: patient, family  Daily Summary of Progress (SLP): unable to show any progress toward functional goals  Plan for Continued Treatment (SLP): Had long discussion w/ pt's niece & brother. Pt more alert today, but exhibiting overt clinical s/sxs aspiration w/ ice cream & pudding. Pt u/a to initiate swallow w/ nectar-thick liquids. Discussed s/sxs aspiration, risks of aspiration, dysphagia, comfort vs safety, and oral care. Pt's family not sure if wish to pursue Hospice or rehab - still discussing - and not sure if wish to pursue safest PO option (NPO, no feeding tube per living will) or comfort option (soft diet, thin or nectar-thick liquid). Paged Dr. Couch & notified RN. SLP will f/u for further edu, pending POC.     Time Calculation:         Time Calculation- SLP     Row Name 07/16/18 1702             Time Calculation- SLP    SLP Start Time 1600  -AC      SLP Received On 07/16/18  -        User Key  (r) = Recorded By, (t) = Taken By, (c) = Cosigned By    Initials Name Provider Type    AC Elissa Henry MS CCC-SLP Speech and Language Pathologist          Therapy Charges for Today     Code Description Service Date Service Provider Modifiers Qty    04792724650 HC ST TREATMENT SWALLOW 4 7/16/2018 Elissa Henry MS CCC-SLP GN 1                 Elissa Henry MS CCC-GARCIA  7/16/2018

## 2018-07-16 NOTE — THERAPY TREATMENT NOTE
Acute Care - Occupational Therapy Treatment Note  [facility]     Patient Name: Arabella Gomes  : 1927  MRN: [id number]  Today's Date: 2018  Onset of Illness/Injury or Date of Surgery: 07/10/18  Date of Referral to OT: 18  Referring Physician: MD Fabián    Admit Date: 7/10/2018       ICD-10-CM ICD-9-CM   1. Altered mental status, unspecified altered mental status type R41.82 780.97   2. Sepsis, due to unspecified organism (CMS/HCC) A41.9 038.9     995.91   3. Urinary tract infection with hematuria, site unspecified N39.0 599.0    R31.9    4. Renal insufficiency N28.9 593.9   5. Anemia, unspecified type D64.9 285.9   6. Dysphagia, unspecified type R13.10 787.20   7. Impaired functional mobility, balance, gait, and endurance Z74.09 V49.89   8. Impaired mobility and ADLs Z74.09 799.89     Patient Active Problem List   Diagnosis   • Alzheimer's disease   • Low back pain   • Acute kidney injury (CMS/HCC)   • Chronic Macrocytic anemia   • Failure to thrive in adult, likely due to moderate-severe dementia   • Dysphagia   • Moderate protein-calorie malnutrition (CMS/HCC)   • CKD (chronic kidney disease) stage 3, GFR 30-59 ml/min   • Sepsis (CMS/HCC)   • Acute cystitis with hematuria   • Right lower lobe pneumonia (CMS/HCC)   • Fever   • Hypotension     Past Medical History:   Diagnosis Date   • Anemia of chronic disease    • Anxiety    • CKD (chronic kidney disease) stage 3, GFR 30-59 ml/min    • Dementia    • LBBB (left bundle branch block)      Past Surgical History:   Procedure Laterality Date   • NO PAST SURGERIES         Therapy Treatment          Rehabilitation Treatment Summary     Row Name 18 1402             Treatment Time/Intention    Discipline occupational therapist  -HK      Document Type therapy note (daily note)  -HK      Subjective Information complains of;weakness;fatigue  -HK      Mode of Treatment individual therapy;occupational therapy  -HK      Patient/Family Observations  Erich at bedside  -HK      Care Plan Review care plan/treatment goals reviewed;risks/benefits reviewed;patient/other agree to care plan  -HK      Care Plan Review, Other Participant(s) family  -HK      Patient Effort good  -HK      Recorded by [HK] Naomi Cee, OT 07/16/18 1554      Row Name 07/16/18 1402             Vital Signs    Pre Systolic BP Rehab --   RN cleared for tx  -HK      Pre Patient Position Supine  -HK      Intra Patient Position Standing  -HK      Post Patient Position Supine  -HK      Recorded by [HK] Naomi Cee OT 07/16/18 1556      Row Name 07/16/18 1408             Cognitive Assessment/Intervention    Additional Documentation Cognitive Assessment/Intervention (Group)  -HK      Recorded by [HK] Naomi Cee OT 07/16/18 8065      Row Name 07/16/18 1406             Cognitive Assessment/Intervention- PT/OT    Affect/Mental Status (Cognitive) confused  -HK      Orientation Status (Cognition) oriented to;person  -HK      Follows Commands (Cognition) follows one step commands;25-49% accuracy  -HK      Memory Deficit (Cognitive) severe deficit;short term memory;long term memory  -HK      Safety Deficit (Cognitive) moderate deficit;safety precautions follow-through/compliance;safety precautions awareness;ability to follow commands;at risk behavior observed;awareness of need for assistance;insight into deficits/self awareness;impulsivity;judgment;problem solving  -HK      Personal Safety Interventions fall prevention program maintained;gait belt;nonskid shoes/slippers when out of bed  -HK      Recorded by [HK] Naomi Cee OT 07/16/18 1554      Row Name 07/16/18 1402             Bed Mobility Assessment/Treatment    Bed Mobility Assessment/Treatment scooting/bridging;supine-sit;sit-supine  -HK      Rolling Left [place] (Bed Mobility) maximum assist (25% patient effort)  -HK2      Rolling Right [place] (Bed Mobility) maximum assist (25% patient effort)  -HK2      Scooting/Bridging  [place] (Bed Mobility) moderate assist (50% patient effort);verbal cues  -HK      Supine-Sit [place] (Bed Mobility) moderate assist (50% patient effort)  -HK      Sit-Supine [place] (Bed Mobility) moderate assist (50% patient effort);verbal cues  -HK      Bed Mobility, Safety Issues decreased use of arms for pushing/pulling;decreased use of legs for bridging/pushing  -HK      Assistive Device (Bed Mobility) head of bed elevated  -HK      Comment (Bed Mobility) Pt advanced to EOB with modA and max verbal cues   -HK      Recorded by [HK] Naomi Cee, OT 07/16/18 1554  [HK2] Naomi Cee, OT 07/16/18 1559      Row Name 07/16/18 1402             Functional Mobility    Functional Mobility- Ind. Level not appropriate to assess  -HK      Recorded by [HK] Naomi Cee, OT 07/16/18 1554      Row Name 07/16/18 1402             Transfer Assessment/Treatment    Transfer Assessment/Treatment sit-stand transfer;stand-sit transfer  -HK      Comment (Transfers) pt attempting to stand and OT donned belt to assist. Pt completed sit to stand with Claudia.   -HK      Recorded by [HK] Naomi Cee, OT 07/16/18 1554      Row Name 07/16/18 1402             Sit-Stand Transfer    Sit-Stand [place] (Transfers) minimum assist (75% patient effort);verbal cues  -HK      Assistive Device (Sit-Stand Transfers) other (see comments)  -HK      Recorded by [HK] Naomi Cee, OT 07/16/18 1554      Row Name 07/16/18 1402             Stand-Sit Transfer    Stand-Sit [place] (Transfers) minimum assist (75% patient effort);verbal cues  -HK      Assistive Device (Stand-Sit Transfers) other (see comments)  -HK      Recorded by [HK] Naomi Cee, OT 07/16/18 1554      Row Name 07/16/18 1402             ADL Assessment/Intervention    20950 - OT Self Care/Mgmt Minutes 15  -HK      BADL Assessment/Intervention grooming;lower body dressing;toileting  -HK      Recorded by [HK] Naomi Cee, OT 07/16/18 1554      Row Name 07/16/18 1402              Grooming Assessment/Training    [place] Level (Grooming) wash face, hands;minimum assist (75% patient effort);verbal cues  -HK      Comment (Grooming) verbal cues and Claudia to intitiate.   -HK      Recorded by [HK] Naomi Cee OT 07/16/18 1554      Row Name 07/16/18 1402             Toileting Assessment/Training    [place] Level (Toileting) toileting skills;maximum assist (25% patient effort)  -HK      Toileting Position unsupported sitting;supine  -HK      Comment (Toileting) Pt with urinary incontinence while sitting EOB. MaxA for natalie care.    -HK      Recorded by [HK] Naomi Cee OT 07/16/18 1554      Row Name 07/16/18 1402             Motor Skills Assessment/Interventions    Additional Documentation Balance (Group);Therapeutic Exercise (Group)  -HK      Recorded by [HK] Naomi Cee OT 07/16/18 1554      Row Name 07/16/18 1402             Therapeutic Exercise    68287 - OT Therapeutic Activity Minutes 10  -HK      Recorded by [] Naomi Cee OT 07/16/18 1554      Row Name 07/16/18 1402             Balance    Balance static sitting balance;static standing balance  -HK      Recorded by [] Naomi Cee OT 07/16/18 1554      Row Name 07/16/18 1402             Static Sitting Balance    Level of [place] (Unsupported Sitting, Static Balance) contact guard assist  -HK      Sitting Position (Unsupported Sitting, Static Balance) sitting on edge of bed  -HK      Time Able to Maintain Position (Unsupported Sitting, Static Balance) 3 to 4 minutes  -HK      Recorded by [HK] Naomi Cee OT 07/16/18 1554      Row Name 07/16/18 1402             Static Standing Balance    Level of [place] (Supported Standing, Static Balance) minimal assist, 75% patient effort  -HK      Time Able to Maintain Position (Supported Standing, Static Balance) 45 to 60 seconds  -HK      Assistive Device Utilized (Supported Standing, Static Balance) other (see comments)  -HK      Recorded by [HK] Naomi Cee, OT  07/16/18 1554      Row Name 07/16/18 1402             Positioning and Restraints    Pre-Treatment Position in bed  -HK      Post Treatment Position bed  -HK      In Bed notified nsg;supine;call light within reach;encouraged to call for assist;exit alarm on  -HK      Recorded by [HK] Naomi Cee, GOEVANNY 07/16/18 1554      Row Name 07/16/18 1402             Pain Assessment    Additional Documentation Pain Scale: FACES Pre/Post-Treatment (Group)  -HK      Recorded by [] Naomi Cee OT 07/16/18 1554      Row Name 07/16/18 1402             Pain Scale: FACES Pre/Post-Treatment    Pain: FACES Scale, Pretreatment 2-->hurts little bit  -HK      Pain: FACES Scale, Post-Treatment 2-->hurts little bit  -HK      Recorded by [] Naomi Cee, OT 07/16/18 1554      Row Name 07/16/18 1402             Outcome Summary/Treatment Plan (OT)    Daily Summary of Progress (OT) progress toward functional goals is good  -HK      Recorded by [] Naomi Cee, GEOVANNY 07/16/18 155        User Key  (r) = Recorded By, (t) = Taken By, (c) = Cosigned By    Initials Name Effective Dates Discipline     Naomi Cee OT 03/07/18 -  OT               Occupational Therapy Education     Title: PT OT SLP Therapies (Active)     Topic: Occupational Therapy (Active)     Point: ADL training (Active)     Description: Instruct learner(s) on proper safety adaptation and remediation techniques during self care or transfers.   Instruct in proper use of assistive devices.   Learning Progress Summary     Learner Status Readiness Method Response Comment Documented by    Patient Active Acceptance E NR Pt educated on ADL retraining with grooming and toileting, safety precautions, and appropriate body mechanics.  07/16/18 1554    Family Done Acceptance E,TB VU   07/14/18 1229     Done Acceptance E VU Pts niece educated on ADL retraining with grooming and feeding, safety precautions, BUE HEP, and appropriate body mechanics.  07/13/18 1142          Point: Home  exercise program (Done)     Description: Instruct learner(s) on appropriate technique for monitoring, assisting and/or progressing therapeutic exercises/activities.   Learning Progress Summary     Learner Status Readiness Method Response Comment Documented by    Family Done Acceptance E,YONY CAMARGO 07/14/18 1229     Done Acceptance E VU Pts niece educated on ADL retraining with grooming and feeding, safety precautions, BUE HEP, and appropriate body mechanics.  07/13/18 1142          Point: Precautions (Active)     Description: Instruct learner(s) on prescribed precautions during self-care and functional transfers.   Learning Progress Summary     Learner Status Readiness Method Response Comment Documented by    Patient Active Acceptance E NR Pt educated on ADL retraining with grooming and toileting, safety precautions, and appropriate body mechanics.  07/16/18 1554    Family Done Acceptance E,YONY CAMARGO 07/14/18 1229     Done Acceptance E VU Pts niece educated on ADL retraining with grooming and feeding, safety precautions, BUE HEP, and appropriate body mechanics.  07/13/18 1142          Point: Body mechanics (Active)     Description: Instruct learner(s) on proper positioning and spine alignment during self-care, functional mobility activities and/or exercises.   Learning Progress Summary     Learner Status Readiness Method Response Comment Documented by    Patient Active Acceptance E NR Pt educated on ADL retraining with grooming and toileting, safety precautions, and appropriate body mechanics.  07/16/18 1554    Family Done Acceptance E,YONY CAMARGO 07/14/18 1229     Done Acceptance E VU Pts niece educated on ADL retraining with grooming and feeding, safety precautions, BUE HEP, and appropriate body mechanics.  07/13/18 1142                      User Key     Initials Effective Dates Name Provider Type Discipline     09/05/17 -  Malinda Carlin, RN Registered Nurse Nurse     03/07/18 -  Naomi Cee OT  Occupational Therapist OT                OT Recommendation and Plan  Outcome Summary/Treatment Plan (OT)  Daily Summary of Progress (OT): progress toward functional goals is good  Anticipated Discharge Disposition (OT): skilled nursing facility  Patient/Family Concerns, Anticipated Discharge Disposition (OT): Family would like her to return to memory care; however pt is currently dependent for all mobility and must be Claudia for memory care. Pt will need rehab prior to returning to memory care.   Therapy Frequency (OT Eval): daily (if pt continues to be somnolent will change to 3x/week)  Daily Summary of Progress (OT): progress toward functional goals is good  Plan of Care Review  Plan of Care Reviewed With: patient  Plan of Care Reviewed With: patient  Outcome Summary: Pt more alert and with improved participation this date. Pt requires modA for supine to sit and maxAx2 for rolling. Pt required Claudia for sit to stand. Pt with urinary incontience. Continue IP OT per POC.         Outcome Measures     Row Name 07/16/18 1402 07/14/18 1310          How much help from another person do you currently need...    Turning from your back to your side while in flat bed without using bedrails?  -- 1  -LS     Moving from lying on back to sitting on the side of a flat bed without bedrails?  -- 1  -LS     Moving to and from a bed to a chair (including a wheelchair)?  -- 1  -LS     Standing up from a chair using your arms (e.g., wheelchair, bedside chair)?  -- 1  -LS     Climbing 3-5 steps with a railing?  -- 1  -LS     To walk in hospital room?  -- 1  -LS     AM-PAC 6 Clicks Score  -- 6  -LS        How much help from another is currently needed...    Putting on and taking off regular lower body clothing? 1  -HK  --     Bathing (including washing, rinsing, and drying) 2  -HK  --     Toileting (which includes using toilet bed pan or urinal) 2  -HK  --     Putting on and taking off regular upper body clothing 2  -HK  --     Taking care  of personal grooming (such as brushing teeth) 2  -HK  --     Eating meals 1  -HK  --     Score 10  -HK  --        Functional Assessment    Outcome Measure Options AM-PAC 6 Clicks Daily Activity (OT)  -HK AM-PAC 6 Clicks Basic Mobility (PT)  -LS       User Key  (r) = Recorded By, (t) = Taken By, (c) = Cosigned By    Initials Name Provider Type     Lillian Vuong, PT Physical Therapist     Naomi Cee, OT Occupational Therapist           Time Calculation:         Time Calculation- OT     Row Name 07/16/18 1600 07/16/18 1402          Time Calculation- OT    OT Start Time 1402  -HK  --     OT Received On 07/16/18  -  --     OT Goal Re-Cert Due Date 07/26/18  -  --        Timed Charges    77292 - OT Therapeutic Activity Minutes  -- 10  -HK     19157 - OT Self Care/Mgmt Minutes  -- 15  -HK       User Key  (r) = Recorded By, (t) = Taken By, (c) = Cosigned By    Initials Name Provider Type     Naomi Cee, OT Occupational Therapist           Therapy Suggested Charges     Code   Minutes Charges    64549 (CPT®) Hc Ot Neuromusc Re Education Ea 15 Min      71418 (CPT®) Hc Ot Ther Proc Ea 15 Min      02619 (CPT®) Hc Ot Therapeutic Act Ea 15 Min 10 1    51549 (CPT®) Hc Ot Manual Therapy Ea 15 Min      04641 (CPT®) Hc Ot Iontophoresis Ea 15 Min      99431 (CPT®) Hc Ot Elec Stim Ea-Per 15 Min      11335 (CPT®) Hc Ot Ultrasound Ea 15 Min      44160 (CPT®) Hc Ot Self Care/Mgmt/Train Ea 15 Min 15 1    Total  25 2        Therapy Charges for Today     Code Description Service Date Service Provider Modifiers Qty    80758036439 HC OT SELF CARE/MGMT/TRAIN EA 15 MIN 7/16/2018 Naomi Cee OT GO 1    36505218841 HC OT THERAPEUTIC ACT EA 15 MIN 7/16/2018 Naomi Cee OT GO 1               Naomi Cee OT  7/16/2018

## 2018-07-16 NOTE — PLAN OF CARE
Problem: Fall Risk (Adult)  Goal: Absence of Fall  Outcome: Ongoing (interventions implemented as appropriate)   07/16/18 1431   Fall Risk (Adult)   Absence of Fall making progress toward outcome       Problem: Skin Injury Risk (Adult)  Goal: Skin Health and Integrity  Outcome: Ongoing (interventions implemented as appropriate)   07/16/18 1431   Skin Injury Risk (Adult)   Skin Health and Integrity making progress toward outcome       Problem: Patient Care Overview  Goal: Plan of Care Review  Outcome: Ongoing (interventions implemented as appropriate)   07/13/18 1143 07/15/18 1649 07/16/18 1419   Coping/Psychosocial   Plan of Care Reviewed With --  --  patient;family   Plan of Care Review   Progress --  no change --    Coping/Psychosocial   Patient Agreement with Plan of Care agrees --  --      Goal: Individualization and Mutuality  Outcome: Ongoing (interventions implemented as appropriate)   07/15/18 1649   Individualization   Patient Specific Preferences continue to provide reassurance with bathroom needs, patient very modest   Patient Specific Goals (Include Timeframe) patient will attempt to assist in ADL's, eat at least 25% of each meal   Patient Specific Interventions encourage any effort to participate in ADL's     Goal: Discharge Needs Assessment  Outcome: Ongoing (interventions implemented as appropriate)   07/10/18 1700 07/15/18 1649   Discharge Needs Assessment   Patient/Family Anticipates Transition to --  inpatient hospice   Patient/Family Anticipated Services at Transition  --    Transportation Anticipated public transportation --        Problem: Infection, Risk/Actual (Adult)  Goal: Infection Prevention/Resolution  Outcome: Ongoing (interventions implemented as appropriate)   07/16/18 1431   Infection, Risk/Actual (Adult)   Infection Prevention/Resolution making progress toward outcome       Problem: Patient Care Overview  Goal: Plan of Care Review  Outcome: Ongoing (interventions  implemented as appropriate)   07/15/18 1649 07/16/18 1419   Coping/Psychosocial   Plan of Care Reviewed With --  patient;family   Plan of Care Review   Progress no change --        Problem: Palliative Care (Adult)  Goal: Identify Related Risk Factors and Signs and Symptoms  Outcome: Outcome(s) achieved Date Met: 07/16/18   07/15/18 1648   Palliative Care (Adult)   Palliative Care: Related Risk Factors advanced age;condition is progressive   Palliative Care: Signs and Symptoms confusion;fatigue;loss of appetite     Goal: Maximized Comfort  Outcome: Ongoing (interventions implemented as appropriate)   07/16/18 1431   Palliative Care (Adult)   Maximized Comfort making progress toward outcome      07/16/18 1431   Palliative Care (Adult)   Maximized Comfort making progress toward outcome     Goal: Enhanced Quality of Life  Outcome: Ongoing (interventions implemented as appropriate)   07/16/18 1431   Palliative Care (Adult)   Enhanced Quality of Life making progress toward outcome

## 2018-07-16 NOTE — PROGRESS NOTES
[facility] Medicine Services  PROGRESS NOTE    Patient Name: Arabella Gomes  : 1927  MRN: [id number]    Date of Admission: 7/10/2018  Length of Stay: 6  Primary Care Physician: No Known Provider    Subjective   Subjective     CC:  F/U AMS    HPI:  At time of eval, no family at bedside.  Pt a bit more alert today, she tried talking to me (nonsensically due to her dementia). Appears comfortable, nonlabored breathing No nursing concerns or events relayed.     Review of Systems  Unable to obtain due to mental status    Otherwise ROS is negative except as mentioned in the HPI.    Objective   Objective     Vital Signs:   Temp:  [98.8 °F (37.1 °C)-98.9 °F (37.2 °C)] 98.9 °F (37.2 °C)  Heart Rate:  [87-89] 87  Resp:  [17-18] 18  BP: (137-149)/(64-69) 149/69        Physical Exam:  Gen-no acute distress, cachetic elderly frail female  CV-RRR, S1 S2 normal, no m/r/g  Resp-CTAB with poor effort and decreased bases, no wheezes  Abd-soft, NT, ND, +BS  Ext-no edema  Neuro-lethargic, will open eyes briefly, no focal deficits      Results Reviewed:  I have personally reviewed current lab, radiology, and data and agree.      Results from last 7 days  Lab Units 18  0647 18  0618  0458  07/10/18  1223   WBC 10*3/mm3 6.82 5.49 5.33  < > 5.45   HEMOGLOBIN g/dL 9.2* 8.3* 7.9*  < > 9.0*   HEMATOCRIT % 27.8* 24.9* 24.4*  < > 27.5*   PLATELETS 10*3/mm3 124* 120* 107*  < > 146*   INR   --   --   --   --  1.07   < > = values in this interval not displayed.    Results from last 7 days  Lab Units 07/15/18  0436 18  2353 18  0647 18  0633 18  0458  07/10/18  1223   SODIUM mmol/L  --   --  147* 145 144  < > 141   POTASSIUM mmol/L 3.9 3.3* 3.2* 3.9 3.4*  < > 4.7   CHLORIDE mmol/L  --   --  112* 113* 116*  < > 105   CO2 mmol/L  --   --  26.0 24.0 19.0*  < > 26.0   BUN mg/dL  --   --  9 10 13  < > 20   CREATININE mg/dL  --   --  1.15 1.03 1.06  < > 1.58*   GLUCOSE mg/dL   --   --  98 90 66*  < > 101*   CALCIUM mg/dL  --   --  7.7* 7.7* 7.7*  < > 8.6*   ALT (SGPT) U/L  --   --   --   --   --   --  14   AST (SGOT) U/L  --   --   --   --   --   --  25   < > = values in this interval not displayed.  Estimated Creatinine Clearance: 29.4 mL/min (by C-G formula based on SCr of 1.15 mg/dL).  No results found for: BNP    Microbiology Results Abnormal     Procedure Component Value - Date/Time    Blood Culture - Blood, [[id number]] Collected:  07/10/18 1240    Lab Status:  Final result Specimen:  Blood from Arm, Left Updated:  07/15/18 1315     Blood Culture No growth at 5 days      Aerobic bottle only    Blood Culture - Blood, [[id number]]  (Normal) Collected:  07/10/18 1250    Lab Status:  Final result Specimen:  Blood from Wrist, Left Updated:  07/15/18 1315     Blood Culture No growth at 5 days    Urine Culture - Urine, Urine, Catheter [[id number]]  (Abnormal) Collected:  07/10/18 1223    Lab Status:  Final result Specimen:  Urine from Urine, Catheter Updated:  07/13/18 1257     Urine Culture 20,000-30,000 CFU/mL Candida albicans (A)    Influenza Antigen, Rapid - Swab, Nasopharynx [[id number]]  (Normal) Collected:  07/10/18 1224    Lab Status:  Final result Specimen:  Swab from Nasopharynx Updated:  07/10/18 1247     Influenza A Ag, EIA Negative     Influenza B Ag, EIA Negative          Imaging Results (last 24 hours)     ** No results found for the last 24 hours. **             I have reviewed the medications.    Assessment/Plan   Assessment / Plan     Hospital Problem List     * (Principal)Sepsis (CMS/[facility])    Alzheimer's disease (Chronic)    Acute kidney injury (CMS/[facility])    CKD (chronic kidney disease) stage 3, GFR 30-59 ml/min    Acute cystitis with hematuria    Right lower lobe pneumonia (CMS/[facility])    Fever    Hypotension             Brief Hospital Course to date:  Arabella Gomes is a 89 y.o. female with PMH significant for Alzheimer's dementia and CKD III. Admitted to [facility] 6/16-6/20/18 with  confusion, fevers/chills and cough. Treated for pneumonia. Respiratory PCR (+) rhinovirus. Discharged on Doxycycline. She returned to [facility] ED 7/10/18 with AMS. Evaluation revealed sepsis secondary to UTI and RLL pneumonia.       Assessment & Plan:  --Blood cultures NGTD. Deescalated from Vanc/Zosyn to just Zosyn then transitioned to Augmentin to complete 7 day course. Urine culture with low yield yeast only and no urine sx's, will not treat.  --Replace K+  --Continues with wax/wane lethargy and poor PO intake. No sedating meds have been administered.  Clinically her sepsis has resolved, CXR better. As d/w family, suspect multiple hospital stays, her dementia, and advanced age contributes and she may be on her natural decline.  Goal is to attempt rehab portential at skilled rehab and transition back to her Memory Care unit if able in future but based on current status she may continue to slowly decline -->  Will consult Palliative Med to help with family decisions for her care and consider full comfort and possibly Hospice at a SNF  --PT/OT following. Planning to go to SNF prior to returning to her memory care unit however may not be interactive enough for skilled rehab. CM to follow up.    DVT Prophylaxis:  [facility]    CODE STATUS:   Code Status and Medical Interventions:   Ordered at: 07/15/18 1006     Limited Support to NOT Include:    Artificial Nutrition    Intubation    Cardioversion/Defibrillation    NIPPV (Non-Invasive Positive Pressure Support)     Code Status:    No CPR     Medical Interventions (Level of Support Prior to Arrest):    Limited     Comments:    Living will reviewed       Disposition: I expect the patient to be discharged to possibly to SNF ~2 days depending on her clinical improvement and interactivity, may go with Hospice to SNF??      Electronically signed by Jamia Couch MD, 07/16/18, 5:28 PM.

## 2018-07-17 PROCEDURE — 99231 SBSQ HOSP IP/OBS SF/LOW 25: CPT | Performed by: INTERNAL MEDICINE

## 2018-07-17 PROCEDURE — 97110 THERAPEUTIC EXERCISES: CPT | Performed by: PHYSICAL THERAPIST

## 2018-07-17 PROCEDURE — 92526 ORAL FUNCTION THERAPY: CPT

## 2018-07-17 PROCEDURE — 97110 THERAPEUTIC EXERCISES: CPT | Performed by: OCCUPATIONAL THERAPIST

## 2018-07-17 PROCEDURE — 97530 THERAPEUTIC ACTIVITIES: CPT | Performed by: PHYSICAL THERAPIST

## 2018-07-17 PROCEDURE — 25010000002 HEPARIN (PORCINE) PER 1000 UNITS: Performed by: PHYSICIAN ASSISTANT

## 2018-07-17 RX ADMIN — DOCUSATE SODIUM 100 MG: 100 CAPSULE, LIQUID FILLED ORAL at 08:16

## 2018-07-17 RX ADMIN — HEPARIN SODIUM 5000 UNITS: 5000 INJECTION, SOLUTION INTRAVENOUS; SUBCUTANEOUS at 08:16

## 2018-07-17 RX ADMIN — HEPARIN SODIUM 5000 UNITS: 5000 INJECTION, SOLUTION INTRAVENOUS; SUBCUTANEOUS at 20:17

## 2018-07-17 NOTE — THERAPY TREATMENT NOTE
Acute Care - Physical Therapy Treatment Note  [facility]     Patient Name: Arabella Gomes  : 1927  MRN: [id number]  Today's Date: 2018  Onset of Illness/Injury or Date of Surgery: 07/10/18  Date of Referral to PT: 18  Referring Physician: MD Fabián    Admit Date: 7/10/2018    Visit Dx:    ICD-10-CM ICD-9-CM   1. Altered mental status, unspecified altered mental status type R41.82 780.97   2. Sepsis, due to unspecified organism (CMS/HCC) A41.9 038.9     995.91   3. Urinary tract infection with hematuria, site unspecified N39.0 599.0    R31.9    4. Renal insufficiency N28.9 593.9   5. Anemia, unspecified type D64.9 285.9   6. Dysphagia, unspecified type R13.10 787.20   7. Impaired functional mobility, balance, gait, and endurance Z74.09 V49.89   8. Impaired mobility and ADLs Z74.09 799.89     Patient Active Problem List   Diagnosis   • Alzheimer's disease   • Low back pain   • Acute kidney injury (CMS/HCC)   • Chronic Macrocytic anemia   • Failure to thrive in adult, likely due to moderate-severe dementia   • Dysphagia   • Moderate protein-calorie malnutrition (CMS/HCC)   • CKD (chronic kidney disease) stage 3, GFR 30-59 ml/min   • Sepsis (CMS/HCC)   • Acute cystitis with hematuria   • Right lower lobe pneumonia (CMS/HCC)   • Fever   • Hypotension       Therapy Treatment          Rehabilitation Treatment Summary     Row Name 18 1324             Treatment Time/Intention    Discipline physical therapist  -LM      Document Type therapy note (daily note)  -LM      Subjective Information complains of;weakness  -LM      Mode of Treatment individual therapy;physical therapy  -LM      Patient/Family Observations Family present and agreeable to PT tx.  -LM      Patient Effort fair  -LM      Existing Precautions/Restrictions fall  -LM      Recorded by [LM] Tala Chavira, PT 18 1400      Row Name 18 1324             Vital Signs    Pre Systolic BP Rehab --   RN cleared for tx  -LM      Pre  "Patient Position Supine  -LM      Intra Patient Position Sitting  -LM      Post Patient Position Supine  -LM      Recorded by [LM] Tala Chavira, PT 07/17/18 1400      Row Name 07/17/18 1324             Cognitive Assessment/Intervention    Additional Documentation Cognitive Assessment/Intervention (Group)  -LM      Recorded by [LM] Tala Chavira, PT 07/17/18 1400      Row Name 07/17/18 1324             Cognitive Assessment/Intervention- PT/OT    Affect/Mental Status (Cognitive) confused  -LM      Orientation Status (Cognition) disoriented to;person;place;situation;time   Able to get name correct with cues  -LM      Follows Commands (Cognition) follows one step commands;0-24% accuracy  -LM      Cognitive Function (Cognitive) attention deficit;executive function deficit;memory deficit;safety deficit  -LM      Safety Deficit (Cognitive) severe deficit;ability to follow commands;judgment;problem solving;safety precautions awareness  -LM      Personal Safety Interventions fall prevention program maintained;muscle strengthening facilitated;nonskid shoes/slippers when out of bed  -LM      Recorded by [LM] Tala Chavira, PT 07/17/18 1400      Row Name 07/17/18 1324             Bed Mobility Assessment/Treatment    Bed Mobility Assessment/Treatment supine-sit;sit-supine  -LM      Scooting/Bridging [place] (Bed Mobility) dependent (less than 25% patient effort);2 person assist   To scoot up in bed using drawsheet  -LM      Supine-Sit [place] (Bed Mobility) maximum assist (25% patient effort);1 person assist  -LM      Sit-Supine [place] (Bed Mobility) maximum assist (25% patient effort);1 person assist  -LM      Comment (Bed Mobility) Upon sitting EOB, pt began crying and stating, \"Leave me alone!\"  Therefore, pt returned to supine.  Pt tolerated sitting EOB for ~30-45 seconds with dependent (heavy posterior lean).  -LM      Recorded by [LM] Tala Chavira, PT 07/17/18 1400      Row Name 07/17/18 1324             " Motor Skills Assessment/Interventions    Additional Documentation Therapeutic Exercise (Group)  -LM      Recorded by [LM] Tala Chavira, PT 07/17/18 1400      Row Name 07/17/18 1324             Therapeutic Exercise    Therapeutic Exercise supine, lower extremities  -LM      Additional Documentation Therapeutic Exercise (Row)  -LM      95409 - PT Therapeutic Exercise Minutes 15  -LM      54251 - PT Therapeutic Activity Minutes 8  -LM      Recorded by [LM] Tala Chavira, PT 07/17/18 1400      Row Name 07/17/18 1324             Lower Extremity Supine Therapeutic Exercise    Performed, Supine Lower Extremity (Therapeutic Exercise) hip abduction/adduction;SLR (straight leg raise);heel slides;ankle pumps;dorsiflexor stretch  -LM      Exercise Type, Supine Lower Extremity (Therapeutic Exercise) AAROM (active assistive range of motion);PROM (passive range of motion)  -LM      Sets/Reps Detail, Supine Lower Extremity (Therapeutic Exercise) x10 each  -LM      Recorded by [LM] Tala Chavira, PT 07/17/18 1400      Row Name 07/17/18 1324             Static Sitting Balance    Level of [place] (Unsupported Sitting, Static Balance) dependent  -LM      Sitting Position (Unsupported Sitting, Static Balance) sitting on edge of bed  -LM      Time Able to Maintain Position (Unsupported Sitting, Static Balance) 30 to 45 seconds  -LM      Recorded by [LM] Tala Chavira, PT 07/17/18 1400      Row Name 07/17/18 1324             Positioning and Restraints    Pre-Treatment Position in bed  -LM      Post Treatment Position bed  -LM      In Bed supine;call light within reach;encouraged to call for assist;exit alarm on;notified nsg  -LM      Recorded by [LM] Tala Chavira, PT 07/17/18 1400      Row Name 07/17/18 1324             Pain Assessment    Additional Documentation Pain Scale: FACES Pre/Post-Treatment (Group)  -LM      Recorded by [LM] Tala Chavira, PT 07/17/18 1400      Row Name 07/17/18 1324             Pain Scale: FACES  Pre/Post-Treatment    Pain: FACES Scale, Pretreatment 0-->no hurt  -LM      Pain: FACES Scale, Post-Treatment 0-->no hurt  -LM      Recorded by [LM] Tala Chavira, PT 07/17/18 1400      Row Name 07/17/18 1324             Plan of Care Review    Plan of Care Reviewed With patient;family  -LM      Recorded by [LM] Tala Chavira, PT 07/17/18 1400      Row Name 07/17/18 1324             Outcome Summary/Treatment Plan (PT)    Daily Summary of Progress (PT) progress toward functional goals is gradual  -LM      Recorded by [LM] Tala Chavira, PT 07/17/18 1400        User Key  (r) = Recorded By, (t) = Taken By, (c) = Cosigned By    Initials Name Effective Dates Discipline    LM Tala Chavira, PT 06/15/16 -  PT                     Physical Therapy Education     Title: PT OT SLP Therapies (Active)     Topic: Physical Therapy (Active)     Point: Mobility training (Done)    Learning Progress Summary     Learner Status Readiness Method Response Comment Documented by    Family Done Acceptance E,YONY CAMARGO 07/14/18 1229          Point: Home exercise program (Active)    Learning Progress Summary     Learner Status Readiness Method Response Comment Documented by    Patient Active Nonacceptance E NL Attempted to discuss benefits of activity.  07/14/18 1335    Family Done Acceptance E,YONY CAMARGO 07/14/18 1229          Point: Body mechanics (Active)    Learning Progress Summary     Learner Status Readiness Method Response Comment Documented by    Patient Active Acceptance E NR  KR 07/12/18 1542    Family Done Acceptance E,YONY CAMARGO 07/14/18 1229          Point: Precautions (Active)    Learning Progress Summary     Learner Status Readiness Method Response Comment Documented by    Patient Active Acceptance E NR  KR 07/12/18 1542    Family Done Acceptance E,YONY CAMARGO 07/14/18 1229                      User Key     Initials Effective Dates Name Provider Type Discipline     06/19/15 -  Lillian Vuong, PT Physical Therapist PT    KARON  "04/03/18 -  Kaylee Mchugh, PT Physical Therapist PT    JA 09/05/17 -  Malinda Carlin, RN Registered Nurse Nurse                    PT Recommendation and Plan     Outcome Summary/Treatment Plan (PT)  Daily Summary of Progress (PT): progress toward functional goals is gradual  Plan of Care Reviewed With: patient, family  Outcome Summary: Pt is making very gradual progress towards PT goals.  Pt transferred supine<-->sit with MaxAx1.  Upon sitting up, pt began crying and saying, \"Leave me alone!\"  Pt made it for about ~30-45 seconds and PT laid her back down.  Completed BLE ther ex AA/PROM.  Continue with skilled PT to improve mobility and safety.          Outcome Measures     Row Name 07/16/18 1402             How much help from another is currently needed...    Putting on and taking off regular lower body clothing? 1  -HK      Bathing (including washing, rinsing, and drying) 2  -HK      Toileting (which includes using toilet bed pan or urinal) 2  -HK      Putting on and taking off regular upper body clothing 2  -HK      Taking care of personal grooming (such as brushing teeth) 2  -HK      Eating meals 1  -HK      Score 10  -HK         Functional Assessment    Outcome Measure Options AM-PAC 6 Clicks Daily Activity (OT)  -HK        User Key  (r) = Recorded By, (t) = Taken By, (c) = Cosigned By    Initials Name Provider Type     Naomi Cee, OT Occupational Therapist           Time Calculation:         PT Charges     Row Name 07/17/18 1324             Time Calculation    Start Time 1324  -LM      PT Received On 07/17/18  -LM      PT Goal Re-Cert Due Date 07/22/18  -LM         Timed Charges    85025 - PT Therapeutic Exercise Minutes 15  -LM      85180 - PT Therapeutic Activity Minutes 8  -LM        User Key  (r) = Recorded By, (t) = Taken By, (c) = Cosigned By    Initials Name Provider Type     Tala Chavira, PT Physical Therapist        Therapy Suggested Charges     Code   Minutes Charges    42063 (CPT®) Hc Pt " Neuromusc Re Education Ea 15 Min      67382 (CPT®) Hc Pt Ther Proc Ea 15 Min 15 1    87007 (CPT®) Hc Gait Training Ea 15 Min      21945 (CPT®) Hc Pt Therapeutic Act Ea 15 Min 8 1    81551 (CPT®) Hc Pt Manual Therapy Ea 15 Min      38267 (CPT®) Hc Pt Iontophoresis Ea 15 Min      68260 (CPT®) Hc Pt Elec Stim Ea-Per 15 Min      79296 (CPT®) Hc Pt Ultrasound Ea 15 Min      31083 (CPT®) Hc Pt Self Care/Mgmt/Train Ea 15 Min      Total  23 2        Therapy Charges for Today     Code Description Service Date Service Provider Modifiers Qty    37500107022 HC PT THER PROC EA 15 MIN 7/17/2018 Tala Chavira, PT GP 1    74412912345 HC PT THERAPEUTIC ACT EA 15 MIN 7/17/2018 Tala Chavira, PT GP 1          PT G-Codes  Outcome Measure Options: AM-PAC 6 Clicks Daily Activity (OT)    Tala Chavira, PT  7/17/2018

## 2018-07-17 NOTE — THERAPY TREATMENT NOTE
Acute Care - Speech Language Pathology   Swallow Treatment Note [facility]     Patient Name: Arabella Gomes  : 1927  MRN: [id number]  Today's Date: 2018  Onset of Illness/Injury or Date of Surgery: 07/10/18     Referring Physician: MD Fabián      Admit Date: 7/10/2018    Visit Dx:      ICD-10-CM ICD-9-CM   1. Altered mental status, unspecified altered mental status type R41.82 780.97   2. Sepsis, due to unspecified organism (CMS/HCC) A41.9 038.9     995.91   3. Urinary tract infection with hematuria, site unspecified N39.0 599.0    R31.9    4. Renal insufficiency N28.9 593.9   5. Anemia, unspecified type D64.9 285.9   6. Dysphagia, unspecified type R13.10 787.20   7. Impaired functional mobility, balance, gait, and endurance Z74.09 V49.89   8. Impaired mobility and ADLs Z74.09 799.89     Patient Active Problem List   Diagnosis   • Alzheimer's disease   • Low back pain   • Acute kidney injury (CMS/HCC)   • Chronic Macrocytic anemia   • Failure to thrive in adult, likely due to moderate-severe dementia   • Dysphagia   • Moderate protein-calorie malnutrition (CMS/HCC)   • CKD (chronic kidney disease) stage 3, GFR 30-59 ml/min   • Sepsis (CMS/HCC)   • Acute cystitis with hematuria   • Right lower lobe pneumonia (CMS/HCC)   • Fever   • Hypotension       Therapy Treatment    Therapy Treatment / Health Promotion    Treatment Time/Intention  Discipline: speech language pathologist (18 1440 : Magdalena Monreal MS CCC-SLP)  Document Type: therapy note (daily note) (18 1440 : Magdalena Monreal MS CCC-SLP)  Subjective Information: no complaints (18 1440 : Magdalena Monreal MS CCC-SLP)  Care Plan Review: care plan/treatment goals reviewed, risks/benefits reviewed, current/potential barriers reviewed, patient/other agree to care plan (18 1440 : Magdalena Monreal MS CCC-SLP)  Care Plan Review, Other Participant(s): other (see comments) (niece) (18 1440 : Magdalena ENGLISH  MS ADONAY Monreal-SLP)  Patient Effort: adequate (07/17/18 1440 : Magdalena Monreal MS CCC-SLP)  Plan of Care Review  Plan of Care Reviewed With: other (see comments) (nikhloe) (07/17/18 1529 : Magdalena Monreal MS CCC-SLP)    Vitals/Pain/Safety  Pain Scale: FACES Pre/Post-Treatment  Pain: FACES Scale, Pretreatment: 2-->hurts little bit (07/17/18 1440 : Magdalena Monreal MS CCC-SLP)  Pain: FACES Scale, Post-Treatment: 2-->hurts little bit (07/17/18 1440 : MS LESLEY Ortiz)  Pre/Post Treatment Pain Comment: Nikhloe felt as needed to be cleaned up - CNA arrived to address (07/17/18 1440 : Magdalena Monreal MS CCC-SLP)    Cognition, Communication, Swallow  Recommendations  Anticipated Dischage Disposition: unknown (07/17/18 1440 : Magdalena Monreal MS CCC-SLP)    Outcome Summary  Outcome Summary/Treatment Plan (SLP)  Daily Summary of Progress (SLP): other (see comments) (adjust goals to comfort diet as appropriate) (07/17/18 1440 : Magdalena Monreal MS CCC-SLP)  Plan for Continued Treatment (SLP): Continue dysphagia tx for education, adjustments, and ? move to comfort PO diet approach (07/17/18 1440 : Magdalena Monreal MS CCC-SLP)  Anticipated Dischage Disposition: unknown (07/17/18 1440 : Magdalena Monreal MS CCC-SLP)            SLP GOALS     Row Name 07/17/18 1440 07/16/18 1600 07/15/18 1150       Oral Nutrition/Hydration Goal 1 (SLP)    Oral Nutrition/Hydration Goal 1, SLP LTG: Pt will tolerate soft whole foods and thin liquids w/ 100% accuracy w/ min cues.   -SM LTG: Pt will tolerate soft whole foods and thin liquids w/ 100% accuracy w/ min cues.   -AC LTG: Pt will tolerate soft whole foods and thin liquids w/ 100% accuracy w/ min cues.   -ML    Time Frame (Oral Nutrition/Hydration Goal 1, SLP) by discharge  -SM by discharge  -AC by discharge  -ML    Barriers (Oral Nutrition/Hydration Goal 1, SLP) May need to adjust goal to reflect palliative measures, pending final family  decisions  -SM  -- Level of alertness/dementia-cognitive deficits  -ML    Progress/Outcomes (Oral Nutrition/Hydration Goal 1, SLP) goal ongoing  -SM goal ongoing;other (see comments)   may revise goal pending POC decisions  -AC progress slower than expected   Unable to maintain alertness for safe po  -ML       Swallow Management Recall Goal (SLP)    Swallow Management Recall Goal (SLP) Pt will tolerate Nectar-thick and level 4 dys diet (no straws) w/ no overt s/s of aspiration or complications w/ 100% accuracy w/ min cues and assist  -SM Pt will tolerate Nectar-thick and level 4 dys diet (no straws) w/ no overt s/s of aspiration or complications w/ 100% accuracy w/ min cues and assist  -AC Pt will tolerate Nectar-thick and level 4 dys diet (no straws) w/ no overt s/s of aspiration or complications w/ 100% accuracy w/ min cues and assist  -ML    Time Frame (Swallow Management Recall Goal, SLP) short term goal (STG);by discharge  -SM short term goal (STG);by discharge  -AC short term goal (STG);by discharge  -ML    Barriers (Swallow Management Recall Goal, SLP) Better tolerance today compared to yesterday though clearly wax/waning. Remains overall some degree aspiration risk. Pt's niece conveys likely to pursue Hospice in [place], not yet finalized.   -SM Pt has had poor PO intake over last couple days 2' lethargy & oral deficits r/t dementia. Oral holding noted w/ all consistencies. Pt u/a to initiate swallow w/ nectar-thick liquids & oral suctioning was required to remove bolus from oral cavity. Pt able to initiate swallow w/ 1/2 tsp ice cream and 1/2 tsp pudding, but exhibiting immediate & overt s/sxs aspiration (coughing).  -AC  --    Progress/Outcomes (Swallow Management Recall Goal, SLP) continuing progress toward goal  -SM unable to make needed progress;other (see comments)   may revise goal pending POC decisions.  -AC progress slower than expected   Opens mouth but does not retrieve material.  All PO suctione  -ML        Swallow Compensatory Strategies Goal (SLP)    Swallow Compensatory Strategies/Techniques Goal (SLP) Pt will tolerate trials of thin liquids w/ no overt s/s of aspiration and complications w/ 80% accuracy w/ min cues.   -SM Pt will tolerate trials of thin liquids w/ no overt s/s of aspiration and complications w/ 80% accuracy w/ min cues.   -AC Pt will tolerate trials of thin liquids w/ no overt s/s of aspiration and complications w/ 80% accuracy w/ min cues.   -ML    Time Frame (Swallow Compensatory Strategies/Techniques Goal, SLP) short term goal (STG);by discharge  -SM short term goal (STG);by discharge  -AC short term goal (STG);by discharge  -ML    Barriers (Swallow Compensatory Strategies/Techniques Goal, SLP) Coughing when provided by tsp. No overt s/s aspiration when allowed pt to take self-administered (small) sip from straw. If pursue comfort PO diet, would consider at least thin H2O for comfort as desires  -SM 0% accuracy. Pt coughing w/ ice cream trial.  -AC Too lethargic to address  -ML    Progress/Outcomes (Swallow Compensatory Strategies/Techniques Goal, SLP) continuing progress toward goal  -SM progress slower than expected;other (see comments)   may revise goal pending POC decisions  -AC goal ongoing   Eduction completed with family re: safe swallow guidelines  -ML      User Key  (r) = Recorded By, (t) = Taken By, (c) = Cosigned By    Initials Name Provider Type    FELIPE Monreal, MS CCC-SLP Speech and Language Pathologist    MAGGIE Henry, MS CCC-SLP Speech and Language Pathologist    SAVANNAH Kumar, CCC-SLP Speech and Language Pathologist          EDUCATION  The patient's niece has been educated in the following areas:   Dysphagia (Swallowing Impairment) Oral Care/Hydration Modified Diet Instruction.    SLP Recommendation and Plan                       Anticipated Dischage Disposition: unknown                Plan of Care Reviewed With: other (see comments) (niece)  Plan of  Care Review  Plan of Care Reviewed With: other (see comments) (niece)  Daily Summary of Progress (SLP): other (see comments) (adjust goals to comfort diet as appropriate)  Plan for Continued Treatment (SLP): Continue dysphagia tx for education, adjustments, and ? move to comfort PO diet approach           Time Calculation:         Time Calculation- SLP     Row Name 07/17/18 1533             Time Calculation- SLP    SLP Start Time 1440  -      SLP Received On 07/17/18  -        User Key  (r) = Recorded By, (t) = Taken By, (c) = Cosigned By    Initials Name Provider Type     Magdalena Monreal MS CCC-SLP Speech and Language Pathologist          Therapy Charges for Today     Code Description Service Date Service Provider Modifiers Qty    [id number] HC ST TREATMENT SWALLOW 3 7/17/2018 Magdalena Monreal MS CCC-SLP GN 1                 Magdalena Monreal MS CCC-SLP  7/17/2018

## 2018-07-17 NOTE — PLAN OF CARE
"Problem: Patient Care Overview  Goal: Plan of Care Review  Outcome: Ongoing (interventions implemented as appropriate)   07/17/18 1324   Coping/Psychosocial   Plan of Care Reviewed With patient;family   OTHER   Outcome Summary Pt is making very gradual progress towards PT goals. Pt transferred supine<-->sit with MaxAx1. Upon sitting up, pt began crying and saying, \"Leave me alone!\" Pt made it for about ~30-45 seconds and PT laid her back down. Completed BLE ther ex AA/PROM. Continue with skilled PT to improve mobility and safety.         "

## 2018-07-17 NOTE — PROGRESS NOTES
Continued Stay Note  [facility]     Patient Name: Arabella Gomes  MRN: [id number]  Today's Date: 7/17/2018    Admit Date: 7/10/2018          Discharge Plan     Row Name 07/17/18 1711       Plan    [place] [facility] [place] [facility]    Patient/Family in Agreement with Plan yes    Plan Comments Chart reviewed.  Roused.  No obvious distress.  Speech confused and rambling during visit.  Met with Toby jean-baptiste, and Melia adames.  Spoke with Kati Martin RN Case Manager.  All agree that d/c plan is for [place] [facility] services 7/18/18.  Ambulance transport arranged per RN Case Manager.  EMS DNR elected per Toby jean-baptiste.  Medical records faxed to [facility].  Will follow for hospice support and to assist/facilitate access to hospice services as elected.  If can be of further assist please contact.....ext [phone].    Row Name 07/17/18 1513       Plan    Plan [facility]    Patient/Family in Agreement with Plan yes    Plan Comments I spoke with Hilda from [facility] and she states the family has decided to private pay with hospice. They have a private room for 7-18-18 if ready. Ambulance is arranged for 1230.              Discharge Codes    No documentation.       Expected Discharge Date and Time     Expected Discharge Date Expected Discharge Time    Jul 16, 2018             Deborah Medrano RN

## 2018-07-17 NOTE — PROGRESS NOTES
Continued Stay Note  [facility]     Patient Name: Arabella Gomes  MRN: [id number]  Today's Date: 7/17/2018    Admit Date: 7/10/2018          Discharge Plan     Row Name 07/17/18 1316       Plan    Plan Skilled vs Hospice    Patient/Family in Agreement with Plan yes    Plan Comments I spoke with the pts kacey Cárdenas and the pts brother. I have asked Hilda from [facility] to call Melia to discuss SNF plt. I am unsure if [facility] will be able to accept the pt skilled or private pay with Hospice. I will await the facilities discussion with the family.              Discharge Codes    No documentation.       Expected Discharge Date and Time     Expected Discharge Date Expected Discharge Time    Jul 16, 2018             Kati Martin RN

## 2018-07-17 NOTE — PROGRESS NOTES
Continued Stay Note  [facility]     Patient Name: Arabella Gomes  MRN: [id number]  Today's Date: 7/17/2018    Admit Date: 7/10/2018          Discharge Plan     Row Name 07/17/18 1513       Plan    Plan [facility]    Patient/Family in Agreement with Plan yes    Plan Comments I spoke with Hilda from [facility] and she states the family has decided to private pay with hospice. They have a private room for 7-18-18 if ready. Ambulance is arranged for 1230.    Row Name 07/17/18 1316       Plan    Plan Skilled vs Hospice    Patient/Family in Agreement with Plan yes    Plan Comments I spoke with the pts kacey Cárdenas and the pts brother. I have asked Hilda from [facility] to call Melia to discuss SNF plt. I am unsure if [facility] will be able to accept the pt skilled or private pay with Hospice. I will await the facilitiers discussion with the family.              Discharge Codes    No documentation.       Expected Discharge Date and Time     Expected Discharge Date Expected Discharge Time    Jul 16, 2018             Kati Martin RN

## 2018-07-17 NOTE — THERAPY TREATMENT NOTE
Acute Care - Occupational Therapy Treatment Note  [facility]     Patient Name: Arabella Gomes  : 1927  MRN: [id number]  Today's Date: 2018  Onset of Illness/Injury or Date of Surgery: 07/10/18  Date of Referral to OT: 18  Referring Physician: MD Fabián    Admit Date: 7/10/2018       ICD-10-CM ICD-9-CM   1. Altered mental status, unspecified altered mental status type R41.82 780.97   2. Sepsis, due to unspecified organism (CMS/HCC) A41.9 038.9     995.91   3. Urinary tract infection with hematuria, site unspecified N39.0 599.0    R31.9    4. Renal insufficiency N28.9 593.9   5. Anemia, unspecified type D64.9 285.9   6. Dysphagia, unspecified type R13.10 787.20   7. Impaired functional mobility, balance, gait, and endurance Z74.09 V49.89   8. Impaired mobility and ADLs Z74.09 799.89     Patient Active Problem List   Diagnosis   • Alzheimer's disease   • Low back pain   • Acute kidney injury (CMS/HCC)   • Chronic Macrocytic anemia   • Failure to thrive in adult, likely due to moderate-severe dementia   • Dysphagia   • Moderate protein-calorie malnutrition (CMS/HCC)   • CKD (chronic kidney disease) stage 3, GFR 30-59 ml/min   • Sepsis (CMS/HCC)   • Acute cystitis with hematuria   • Right lower lobe pneumonia (CMS/HCC)   • Fever   • Hypotension     Past Medical History:   Diagnosis Date   • Anemia of chronic disease    • Anxiety    • CKD (chronic kidney disease) stage 3, GFR 30-59 ml/min    • Dementia    • LBBB (left bundle branch block)      Past Surgical History:   Procedure Laterality Date   • NO PAST SURGERIES         Therapy Treatment          Rehabilitation Treatment Summary     Row Name 18 1535 18 1440 18 1324       Treatment Time/Intention    Discipline occupational therapist  -SD speech language pathologist  -SM physical therapist  -LM    Document Type therapy note (daily note)  -SD therapy note (daily note)  -SM therapy note (daily note)  -LM    Subjective Information no  complaints  -SD no complaints  -SM complains of;weakness  -LM    Mode of Treatment occupational therapy  -SD  -- individual therapy;physical therapy  -LM    Patient/Family Observations pt.'s niece present during tx & more family arrived during tx session  -SD  -- Family present and agreeable to PT tx.  -LM    Care Plan Review care plan/treatment goals reviewed;risks/benefits reviewed;patient/other agree to care plan  -SD care plan/treatment goals reviewed;risks/benefits reviewed;current/potential barriers reviewed;patient/other agree to care plan  -SM  --    Care Plan Review, Other Participant(s) other (see comments)   niece  -SD other (see comments)   niece  -SM  --    Total Minutes, Occupational Therapy Treatment 13  -SD  --  --    Patient Effort fair   pt. very lethargic during tx, difficult to arouse  -SD adequate  -SM fair  -LM    Existing Precautions/Restrictions  --  -- fall  -LM    Recorded by [SD] Sarah Rodriguez, OT 07/17/18 1557 [SM] Magdalena Monreal, MS CCC-SLP 07/17/18 1525 [LM] Tala Chavira, PT 07/17/18 1400    Row Name 07/17/18 1535 07/17/18 1324          Vital Signs    Pre Systolic BP Rehab  -- --   RN cleared for tx  -LM     Pre Patient Position Supine  -SD Supine  -LM     Intra Patient Position Sitting   HOB elevated to increase pt.'s alertness  -SD Sitting  -LM     Post Patient Position Sitting   HOB elevated  -SD Supine  -LM     Recorded by [SD] Sarah Rodriguez, OT 07/17/18 1557 [LM] Tala Chavira, PT 07/17/18 1400     Row Name 07/17/18 1324             Cognitive Assessment/Intervention    Additional Documentation Cognitive Assessment/Intervention (Group)  -LM      Recorded by [LM] Tala Chavira, PT 07/17/18 1400      Row Name 07/17/18 1535 07/17/18 1324          Cognitive Assessment/Intervention- PT/OT    Affect/Mental Status (Cognitive) confused;low arousal/lethargic  -SD confused  -LM     Orientation Status (Cognition) --   pt. appeared to respond to her name at times  -SD  "disoriented to;person;place;situation;time   Able to get name correct with cues  -LM     Follows Commands (Cognition)  -- follows one step commands;0-24% accuracy  -LM     Cognitive Function (Cognitive)  -- attention deficit;executive function deficit;memory deficit;safety deficit  -LM     Safety Deficit (Cognitive)  -- severe deficit;ability to follow commands;judgment;problem solving;safety precautions awareness  -LM     Personal Safety Interventions  -- fall prevention program maintained;muscle strengthening facilitated;nonskid shoes/slippers when out of bed  -LM     Recorded by [SD] Sarah Rodriguez, OT 07/17/18 1557 [LM] Tala Chavira, PT 07/17/18 1400     Row Name 07/17/18 1535 07/17/18 1324          Bed Mobility Assessment/Treatment    Bed Mobility Assessment/Treatment  -- supine-sit;sit-supine  -LM     Scooting/Bridging Bremer (Bed Mobility)  -- dependent (less than 25% patient effort);2 person assist   To scoot up in bed using drawsheet  -LM     Supine-Sit Bremer (Bed Mobility)  -- maximum assist (25% patient effort);1 person assist  -LM     Sit-Supine Bremer (Bed Mobility)  -- maximum assist (25% patient effort);1 person assist  -LM     Comment (Bed Mobility) pt. lethargic & requiring therapist's full assistance to reposition in bed  -SD Upon sitting EOB, pt began crying and stating, \"Leave me alone!\"  Therefore, pt returned to supine.  Pt tolerated sitting EOB for ~30-45 seconds with dependent (heavy posterior lean).  -LM     Recorded by [SD] Sarah Rodriguez, OT 07/17/18 1557 [LM] Tala Chavira, PT 07/17/18 1400     Row Name 07/17/18 1535             ADL Assessment/Intervention    90801 - OT Self Care/Mgmt Minutes 5  -SD      Recorded by [SD] Sarah Rodriguez OT 07/17/18 1557      Row Name 07/17/18 1535             Grooming Assessment/Training    Bremer Level (Grooming) oral care regimen  -SD      Grooming Position sitting up in bed  -SD      Comment (Grooming) pt. " required hand over hand to swab out her mouth & apply lip moisturizer  -SD      Recorded by [SD] Sarah Rodriguez, OT 07/17/18 1557      Row Name 07/17/18 1324             Motor Skills Assessment/Interventions    Additional Documentation Therapeutic Exercise (Group)  -LM      Recorded by [LM] Tala Chavira, PT 07/17/18 1400      Row Name 07/17/18 1535 07/17/18 1324          Therapeutic Exercise    Therapeutic Exercise  -- supine, lower extremities  -LM     Additional Documentation  -- Therapeutic Exercise (Row)  -LM     75582 - PT Therapeutic Exercise Minutes  -- 15  -LM     73719 - PT Therapeutic Activity Minutes  -- 8  -LM     99227 - OT Therapeutic Exercise Minutes 5  -SD  --     Recorded by [SD] Sarah Rodriguez, OT 07/17/18 1557 [LM] Tala Chavira, PT 07/17/18 1400     Row Name 07/17/18 1324             Lower Extremity Supine Therapeutic Exercise    Performed, Supine Lower Extremity (Therapeutic Exercise) hip abduction/adduction;SLR (straight leg raise);heel slides;ankle pumps;dorsiflexor stretch  -LM      Exercise Type, Supine Lower Extremity (Therapeutic Exercise) AAROM (active assistive range of motion);PROM (passive range of motion)  -LM      Sets/Reps Detail, Supine Lower Extremity (Therapeutic Exercise) x10 each  -LM      Recorded by [LM] Tala Chavira, PT 07/17/18 1400      Row Name 07/17/18 1535             Therapeutic Exercise    Lower Extremity Range of Motion (Therapeutic Exercise) knee flexion/extension, bilateral;ankle dorsiflexion/plantar flexion, bilateral;hip internal/external rotation, bilateral;hip abduction/adduction, bilateral;hip flexion/extension, bilateral  -SD      Sets/Reps (Therapeutic Exercise) 6/5-10  -SD      Recorded by [SD] Sarah Rodriguez, OT 07/17/18 1557      Row Name 07/17/18 1324             Static Sitting Balance    Level of Loyal (Unsupported Sitting, Static Balance) dependent  -LM      Sitting Position (Unsupported Sitting, Static Balance) sitting on  edge of bed  -LM      Time Able to Maintain Position (Unsupported Sitting, Static Balance) 30 to 45 seconds  -LM      Recorded by [LM] Tala Chavira, PT 07/17/18 1400      Row Name 07/17/18 1535 07/17/18 1324          Positioning and Restraints    Pre-Treatment Position in bed  -SD in bed  -LM     Post Treatment Position bed  -SD bed  -LM     In Bed sitting;with family/caregiver   HOB elevated  -SD supine;call light within reach;encouraged to call for assist;exit alarm on;notified nsg  -LM     Recorded by [SD] Sarah Rodriguez, OT 07/17/18 1557 [LM] Tala Chavira, PT 07/17/18 1400     Row Name 07/17/18 1324             Pain Assessment    Additional Documentation Pain Scale: FACES Pre/Post-Treatment (Group)  -LM      Recorded by [LM] Tala Chavira, PT 07/17/18 1400      Row Name 07/17/18 1535 07/17/18 1440 07/17/18 1324       Pain Scale: FACES Pre/Post-Treatment    Pain: FACES Scale, Pretreatment 0-->no hurt  -SD 2-->hurts little bit  -SM 0-->no hurt  -LM    Pain: FACES Scale, Post-Treatment 0-->no hurt  -SD 2-->hurts little bit  -SM 0-->no hurt  -LM    Pre/Post Treatment Pain Comment  -- Niece felt as needed to be cleaned up - CNA arrived to address  -  --    Recorded by [SD] Sarah Rodriguez, OT 07/17/18 1557 [SM] Magdalena Monreal MS CCC-SLP 07/17/18 1525 [LM] Tala Chavira, PT 07/17/18 1400    Row Name 07/17/18 1324             Plan of Care Review    Plan of Care Reviewed With patient;family  -LM      Recorded by [LM] Tala Chavira, PT 07/17/18 1400      Row Name 07/17/18 1535             Outcome Summary/Treatment Plan (OT)    Daily Summary of Progress (OT) progress toward functional goals is gradual  -SD      Plan for Continued Treatment (OT) continue with OT POC  -SD      Anticipated Discharge Disposition (OT) skilled nursing facility  -SD      Patient/Family Concerns, Anticipated Discharge Disposition (OT) pt.'s niece stated that pt. is being transferred to [facility] tomorrow  -SD      Recorded by  [SD] Sarah Rodriguez, OT 07/17/18 1557      Row Name 07/17/18 1324             Outcome Summary/Treatment Plan (PT)    Daily Summary of Progress (PT) progress toward functional goals is gradual  -LM      Recorded by [LM] Tala Chavira, PT 07/17/18 1400      Row Name 07/17/18 1440             Outcome Summary/Treatment Plan (SLP)    Daily Summary of Progress (SLP) other (see comments)   adjust goals to comfort diet as appropriate  -SM      Plan for Continued Treatment (SLP) Continue dysphagia tx for education, adjustments, and ? move to comfort PO diet approach  -SM      Anticipated Dischage Disposition unknown  -SM      Recorded by [SM] Magdalena Monreal, MS CCC-SLP 07/17/18 1525        User Key  (r) = Recorded By, (t) = Taken By, (c) = Cosigned By    Initials Name Effective Dates Discipline    SD Sarah Rodriguez, OT 06/08/18 -  OT    SM Magdalena Monreal, MS CCC-SLP 06/22/15 -  SLP    LM Tala Chavira, PT 06/15/16 -  PT               Occupational Therapy Education     Title: PT OT SLP Therapies (Active)     Topic: Occupational Therapy (Active)     Point: ADL training (Active)     Description: Instruct learner(s) on proper safety adaptation and remediation techniques during self care or transfers.   Instruct in proper use of assistive devices.   Learning Progress Summary     Learner Status Readiness Method Response Comment Documented by    Patient Active Acceptance E NR Pt educated on ADL retraining with grooming and toileting, safety precautions, and appropriate body mechanics.  07/16/18 1554    Family Done Dennys E,D VU Pt. remained lethargic throughout tx, but pt.'s niece was attentive to tx & verbalized understanding of B LE exercises. Pt.'s alertness increased momentarily with grooming tasks & new family members arriving in room. SD 07/17/18 1559     Done Acceptance E,TB VU  JA 07/14/18 1229     Done Acceptance E VU Pts niece educated on ADL retraining with grooming and feeding, safety precautions,  BUE HEP, and appropriate body mechanics.  07/13/18 1142          Point: Home exercise program (Done)     Description: Instruct learner(s) on appropriate technique for monitoring, assisting and/or progressing therapeutic exercises/activities.   Learning Progress Summary     Learner Status Readiness Method Response Comment Documented by    Family Done TIFFANIE Stanton VU Pt. remained lethargic throughout tx, but pt.'s niece was attentive to tx & verbalized understanding of B LE exercises. Pt.'s alertness increased momentarily with grooming tasks & new family members arriving in room. SD 07/17/18 1559     Done Acceptance E,YONY CAMARGO 07/14/18 1229     Done Acceptance E VU Pts niece educated on ADL retraining with grooming and feeding, safety precautions, BUE HEP, and appropriate body mechanics.  07/13/18 1142          Point: Precautions (Active)     Description: Instruct learner(s) on prescribed precautions during self-care and functional transfers.   Learning Progress Summary     Learner Status Readiness Method Response Comment Documented by    Patient Active Acceptance E NR Pt educated on ADL retraining with grooming and toileting, safety precautions, and appropriate body mechanics.  07/16/18 1554    Family Done TIFFANIE Stanton Pt. remained lethargic throughout tx, but pt.'s niece was attentive to tx & verbalized understanding of B LE exercises. Pt.'s alertness increased momentarily with grooming tasks & new family members arriving in room. SD 07/17/18 1559     Done Acceptance E,YONY CAMARGO 07/14/18 1229     Done Acceptance E VU Pts niece educated on ADL retraining with grooming and feeding, safety precautions, BUE HEP, and appropriate body mechanics.  07/13/18 1142          Point: Body mechanics (Active)     Description: Instruct learner(s) on proper positioning and spine alignment during self-care, functional mobility activities and/or exercises.   Learning Progress Summary     Learner Status Readiness Method Response  Comment Documented by    Patient Active Acceptance E NR Pt educated on ADL retraining with grooming and toileting, safety precautions, and appropriate body mechanics.  07/16/18 1554    Family Done Laloer E,TIFFANIE VU Pt. remained lethargic throughout tx, but pt.'s niece was attentive to tx & verbalized understanding of B LE exercises. Pt.'s alertness increased momentarily with grooming tasks & new family members arriving in room. SD 07/17/18 1559     Done Acceptance E,TB VU   07/14/18 1229     Done Acceptance E VU Pts niece educated on ADL retraining with grooming and feeding, safety precautions, BUE HEP, and appropriate body mechanics.  07/13/18 1142                      User Key     Initials Effective Dates Name Provider Type Discipline    SD 06/08/18 -  Sarah Rodriguez, OT Occupational Therapist OT     09/05/17 -  Malinda Carlin, RN Registered Nurse Nurse     03/07/18 -  Naomi Cee, OT Occupational Therapist OT                OT Recommendation and Plan  Outcome Summary/Treatment Plan (OT)  Daily Summary of Progress (OT): progress toward functional goals is gradual  Plan for Continued Treatment (OT): continue with OT POC  Anticipated Discharge Disposition (OT): skilled nursing facility  Patient/Family Concerns, Anticipated Discharge Disposition (OT): pt.'s niece stated that pt. is being transferred to [facility] tomorrow  Daily Summary of Progress (OT): progress toward functional goals is gradual  Plan of Care Review  Plan of Care Reviewed With: patient, family  Plan of Care Reviewed With: patient, family  Outcome Summary: Pt. very lethargic this pm & only opening eyes a few times when HOB elevated and given max v/c's. Pt. required hand over hand with oral care activity.         Outcome Measures     Row Name 07/17/18 1535 07/16/18 1402          How much help from another is currently needed...    Putting on and taking off regular lower body clothing? 1  -SD 1  -HK     Bathing (including washing, rinsing,  and drying) 2  -SD 2  -HK     Toileting (which includes using toilet bed pan or urinal) 2  -SD 2  -HK     Putting on and taking off regular upper body clothing 2  -SD 2  -HK     Taking care of personal grooming (such as brushing teeth) 2  -SD 2  -HK     Eating meals 1  -SD 1  -HK     Score 10  -SD 10  -HK        Functional Assessment    Outcome Measure Options  -- AM-PAC 6 Clicks Daily Activity (OT)  -HK       User Key  (r) = Recorded By, (t) = Taken By, (c) = Cosigned By    Initials Name Provider Type    OSMIN Rodriguez, OT Occupational Therapist    JENNIFER Cee, OT Occupational Therapist           Time Calculation:         Time Calculation- OT     Row Name 07/17/18 1535             Time Calculation- OT    OT Start Time 1535  -SD      OT Stop Time 1548  -SD      OT Time Calculation (min) 13 min  -SD      OT Received On 07/17/18  -SD      OT Goal Re-Cert Due Date 07/26/18  -SD         Timed Charges    29596 - OT Therapeutic Exercise Minutes 5  -SD      66770 - OT Self Care/Mgmt Minutes 5  -SD        User Key  (r) = Recorded By, (t) = Taken By, (c) = Cosigned By    Initials Name Provider Type    OSMIN Rodriguez, OT Occupational Therapist           Therapy Suggested Charges     Code   Minutes Charges    24473 (CPT®) Hc Ot Neuromusc Re Education Ea 15 Min      87686 (CPT®) Hc Ot Ther Proc Ea 15 Min 5 1    94277 (CPT®) Hc Ot Therapeutic Act Ea 15 Min      99809 (CPT®) Hc Ot Manual Therapy Ea 15 Min      08829 (CPT®) Hc Ot Iontophoresis Ea 15 Min      62981 (CPT®) Hc Ot Elec Stim Ea-Per 15 Min      25303 (CPT®) Hc Ot Ultrasound Ea 15 Min      84130 (CPT®) Hc Ot Self Care/Mgmt/Train Ea 15 Min 5     Total  10 1        Therapy Charges for Today     Code Description Service Date Service Provider Modifiers Qty    [id number] HC OT THER PROC EA 15 MIN 7/17/2018 Sarah Rodriguez OT GO 1               Sarah Rodriguez OT  7/17/2018

## 2018-07-17 NOTE — PLAN OF CARE
Problem: Patient Care Overview  Goal: Plan of Care Review  Outcome: Ongoing (interventions implemented as appropriate)   07/17/18 5807   Coping/Psychosocial   Plan of Care Reviewed With other (see comments)  (niece)   SLP treatment completed. Niece at the bedside. Better day regarding tolerance of diet though clearly this will continue to wax/wane and maintain some degree aspiration risk. Per niece, seems family is likely moving towards Hospice at the NH, though nothing definite. Trialed pt with thin H2O - coughing only off spoon, better when she could self-administer/control with straw. If family wishes to balance comfort with safety, Recommend: Continue mech-soft/chopped diet (though will have moments/meals when best suited with only puree) and nectar-thick liquids. Consider allowing for thin H2O for comfort (H2O safest option, can add others liquids pending family's wishes). Oral care BID as will allow. SLP will continue to follow to assist with education and adjustments as appropriate. Please see note for further details and recommendations.

## 2018-07-17 NOTE — PLAN OF CARE
Problem: Fall Risk (Adult)  Goal: Absence of Fall  Outcome: Ongoing (interventions implemented as appropriate)   07/17/18 0428   Fall Risk (Adult)   Absence of Fall making progress toward outcome       Problem: Skin Injury Risk (Adult)  Goal: Skin Health and Integrity  Outcome: Ongoing (interventions implemented as appropriate)   07/17/18 0428   Skin Injury Risk (Adult)   Skin Health and Integrity making progress toward outcome       Problem: Patient Care Overview  Goal: Plan of Care Review  Outcome: Ongoing (interventions implemented as appropriate)   07/17/18 0428   Coping/Psychosocial   Plan of Care Reviewed With patient   Plan of Care Review   Progress no change   OTHER   Outcome Summary Pt is alert at times but unable to swallow meds or food. VSS.    Coping/Psychosocial   Patient Agreement with Plan of Care unable to participate       Problem: Infection, Risk/Actual (Adult)  Goal: Infection Prevention/Resolution  Outcome: Ongoing (interventions implemented as appropriate)   07/17/18 0428   Infection, Risk/Actual (Adult)   Infection Prevention/Resolution making progress toward outcome

## 2018-07-17 NOTE — PLAN OF CARE
Problem: Patient Care Overview  Goal: Interprofessional Rounds/Family Conf  Outcome: Ongoing (interventions implemented as appropriate)   07/17/18 1537   Interdisciplinary Rounds/Family Conf   Summary Plan for transfer to [facility] per [facility] tomorrow, hospice to follow.

## 2018-07-17 NOTE — PLAN OF CARE
Problem: Patient Care Overview  Goal: Plan of Care Review  Outcome: Ongoing (interventions implemented as appropriate)   07/17/18 6284   Coping/Psychosocial   Plan of Care Reviewed With patient;family   Plan of Care Review   Progress no change   OTHER   Outcome Summary Pt. very lethargic this pm & only opening eyes a few times when HOB elevated and given max v/c's. Pt. required hand over hand with oral care activity.    Coping/Psychosocial   Patient Agreement with Plan of Care agrees

## 2018-07-18 VITALS
SYSTOLIC BLOOD PRESSURE: 160 MMHG | OXYGEN SATURATION: 96 % | WEIGHT: 129 LBS | BODY MASS INDEX: 22.02 KG/M2 | HEIGHT: 64 IN | TEMPERATURE: 97 F | DIASTOLIC BLOOD PRESSURE: 91 MMHG | RESPIRATION RATE: 16 BRPM | HEART RATE: 91 BPM

## 2018-07-18 PROCEDURE — 25010000002 HEPARIN (PORCINE) PER 1000 UNITS: Performed by: PHYSICIAN ASSISTANT

## 2018-07-18 PROCEDURE — 99239 HOSP IP/OBS DSCHRG MGMT >30: CPT | Performed by: NURSE PRACTITIONER

## 2018-07-18 RX ADMIN — HEPARIN SODIUM 5000 UNITS: 5000 INJECTION, SOLUTION INTRAVENOUS; SUBCUTANEOUS at 08:52

## 2018-07-18 NOTE — DISCHARGE SUMMARY
[facility] Medicine Services  DISCHARGE SUMMARY    Patient Name: Arabella Gomes  : 1927  MRN: [id number]    Date of Admission: 7/10/2018  Date of Discharge:  2018  Primary Care Physician: No Known Provider    Consults     Date and Time Order Name Status Description    2018 1204 Inpatient Palliative Care MD Consult Completed         Hospital Course     Presenting Problem:   Sepsis (CMS/[facility]) [A41.9]    Active Hospital Problems    Diagnosis Date Noted   • **Sepsis (CMS/HCC) [A41.9] 07/10/2018   • Acute cystitis with hematuria [N30.01] 07/10/2018   • Right lower lobe pneumonia (CMS/[facility]) [J18.1] 07/10/2018   • Fever [R50.9] 07/10/2018   • Hypotension [I95.9] 07/10/2018   • CKD (chronic kidney disease) stage 3, GFR 30-59 ml/min [N18.3]    • Acute kidney injury (CMS/[facility]) [N17.9] 2016   • Alzheimer's disease [G30.9] 2016      Resolved Hospital Problems    Diagnosis Date Noted Date Resolved   No resolved problems to display.          Hospital Course:  Arabella Gomes is a 89 y.o. female who presented to the ED from a memory care assisted living facility with AMS and lethargy on 7/10/18.  The patient was found to have a UTI.  She met sepsis criteria and was started on IV vancomycin and zosyn.  She was deescalated from vanc/zosyn to augmentin and completed a 7 day course.  Her urine culture showed a low yield yeast only.  The patient was also treated for a RLL pneumonia.  Her lethargy and mental status waxed and waned.  She has had very poor oral intake.  Her family has opted for hospice care.  This is likely due to slow natural decline.  Her family has accepted a self pay bed at [facility] and she will be transferred there today at 1230 by ambulance.  [facility] will follow her at the nursing facility.        Day of Discharge     HPI:   Resting in bed with eyes closed.  Arouses to voice.  Denies discomfort but her replies are inconsistent and  gargled.    Review of Systems  Unable to obtain due to altered mental status    Otherwise ROS is negative except as mentioned in the HPI.    Vital Signs:   Temp:  [97 °F (36.1 °C)-98.3 °F (36.8 °C)] 97 °F (36.1 °C)  Heart Rate:  [] 91  Resp:  [17] 17  BP: (155-160)/(81-91) 160/91     Physical Exam:  Constitutional: No acute distress, asleep but arouses to voice, no family at bedside  HENT: NCAT, mucous membranes dry  Respiratory: Clear to auscultation bilaterally, shallow respirations but unlabored breathing.   Cardiovascular: RRR, no murmurs, rubs, or gallops, palpable pedal pulses bilaterally  Gastrointestinal: Positive bowel sounds, soft, nontender, nondistended  Musculoskeletal: No bilateral ankle edema  Psychiatric: Flat affect, cooperative  Neurologic: Oriented x1, generalized weakness,  speech garbled  Skin: No rashes, loose fragile skin      Pertinent  and/or Most Recent Results       Results from last 7 days  Lab Units 07/15/18  0436 07/14/18  2353 07/14/18  0647 07/13/18  0633 07/12/18  0458   WBC 10*3/mm3  --   --  6.82 5.49 5.33   HEMOGLOBIN g/dL  --   --  9.2* 8.3* 7.9*   HEMATOCRIT %  --   --  27.8* 24.9* 24.4*   PLATELETS 10*3/mm3  --   --  124* 120* 107*   SODIUM mmol/L  --   --  147* 145 144   POTASSIUM mmol/L 3.9 3.3* 3.2* 3.9 3.4*   CHLORIDE mmol/L  --   --  112* 113* 116*   CO2 mmol/L  --   --  26.0 24.0 19.0*   BUN mg/dL  --   --  9 10 13   CREATININE mg/dL  --   --  1.15 1.03 1.06   GLUCOSE mg/dL  --   --  98 90 66*   CALCIUM mg/dL  --   --  7.7* 7.7* 7.7*           Invalid input(s): PROT, LABALBU        Invalid input(s): TG, LDLCALC, LDLREALC      Brief Urine Lab Results  (Last result in the past 365 days)      Color   Clarity   Blood   Leuk Est   Nitrite   Protein   CREAT   Urine HCG        07/10/18 1223 Yellow Cloudy(A) Moderate (2+)(A) Large (3+)(A) Negative 30 mg/dL (1+)(A)               Microbiology Results Abnormal     Procedure Component Value - Date/Time    Blood Culture - Blood,  [[id number]] Collected:  07/10/18 1240    Lab Status:  Final result Specimen:  Blood from Arm, Left Updated:  07/15/18 1315     Blood Culture No growth at 5 days      Aerobic bottle only    Blood Culture - Blood, [[id number]]  (Normal) Collected:  07/10/18 1250    Lab Status:  Final result Specimen:  Blood from Wrist, Left Updated:  07/15/18 1315     Blood Culture No growth at 5 days    Urine Culture - Urine, Urine, Catheter [[id number]]  (Abnormal) Collected:  07/10/18 1223    Lab Status:  Final result Specimen:  Urine from Urine, Catheter Updated:  07/13/18 1257     Urine Culture 20,000-30,000 CFU/mL Candida albicans (A)    Influenza Antigen, Rapid - Swab, Nasopharynx [[id number]]  (Normal) Collected:  07/10/18 1224    Lab Status:  Final result Specimen:  Swab from Nasopharynx Updated:  07/10/18 1247     Influenza A Ag, EIA Negative     Influenza B Ag, EIA Negative          Imaging Results (all)     Procedure Component Value Units Date/Time    XR Chest 1 View [[id number]] Collected:  07/15/18 1325     Updated:  07/15/18 1510    Narrative:       EXAMINATION: XR CHEST, SINGLE VIEW - 7/15/2018     INDICATION:  R41.82-Altered mental status, unspecified; A41.9-Sepsis,  unspecified organism; N39.0-Urinary tract infection, site not specified;  R31.9-Hematuria, unspecified; N28.9-Disorder of kidney and ureter,  unspecified; D64.9-Anemia, unspecified; R13.10-Dysphagia, unspecified;  Z74.09-Other reduced mobility. Lethargy.     TECHNIQUE:  Single view frontal chest.     COMPARISONS: 7/10/2018.     FINDINGS:  Improvement in the right lower lobe opacification. No sizable  effusion. No pneumothorax. Unchanged cardiopericardial silhouette.  Atherosclerosis.       Impression:       Improved right lower lobe opacification.     DICTATED:   7/15/2018  EDITED/ls :   7/15/2018      This report was finalized on 7/15/2018 3:08 PM by Rafael García.       CT Head Without Contrast [[id number]] Collected:  07/11/18 0802     Updated:  07/11/18 0916     Narrative:       EXAMINATION: CT HEAD WO CONTRAST- 07/10/2018     INDICATION: Mental status change, unresponsive.     TECHNIQUE: Multiple axial CT imaging was obtained of the head from the  skull base to the skull vertex without the administration of intravenous  contrast.     The radiation dose reduction device was turned on for each scan per the  ALARA (As Low as Reasonably Achievable) protocol.     COMPARISON: None.     FINDINGS: There is atrophy identified of the brain. Some low-density  area is seen in the periventricular white matter suggesting chronic  small vessel ischemic change. No hemorrhage or hydrocephalus. No mass,  mass effect, or midline shift. No abnormal extraaxial fluid collection  is identified. The bony structures reveal no evidence of osseous  abnormality. Visualized paranasal sinuses are clear. The mastoid air  cells are patent.       Impression:       Atrophy and chronic changes seen within the brain with no  acute intracranial abnormality identified.     D:  07/10/2018  E:  07/11/2018     This report was finalized on 7/11/2018 9:14 AM by Dr. Quiana Whalen MD.       XR Chest 1 View [[id number]] Collected:  07/10/18 1409     Updated:  07/10/18 1411    Narrative:       EXAMINATION: XR CHEST 1 VW- 07/10/2018     INDICATION: AMS; R41.82-Altered mental status, unspecified;  A41.9-Sepsis, unspecified organism; N39.0-Urinary tract infection, site  not specified; R31.9-Hematuria, unspecified; N28.9-Disorder of kidney  and ureter, unspecified; D64.9-Anemia, unspecified      COMPARISON: 06/16/2018     FINDINGS: Portable chest reveals patchy ill-defined opacification seen  within the right lower lung field slightly worsened when compared to the  prior study. Minimal increased markings identified at the left lung  base. Degenerative changes seen within the spine. Vascular  calcifications seen within the thoracic aorta.           Impression:       Slight worsening identified of the markings at the  right  lung base. Heart is borderline enlarged. The remainder of the lung  fields are clear.     D:  07/10/2018  E:  07/10/2018     This report was finalized on 7/10/2018 2:09 PM by Dr. Quiana Whalen MD.                              Discharge Details        Discharge Medications      Continue These Medications      Instructions Start Date   acetaminophen 325 MG tablet  Commonly known as:  TYLENOL   650 mg, Oral, Every 4 Hours PRN      docusate sodium 100 MG capsule   100 mg, Oral, 2 Times Daily      donepezil 5 MG tablet  Commonly known as:  ARICEPT   5 mg, Oral, Nightly      memantine 28 MG capsule sustained-release 24 hr extended release capsule  Commonly known as:  NAMENDA XR   28 mg, Oral, Daily      oxybutynin 5 MG tablet  Commonly known as:  DITROPAN   5 mg, Oral, Daily         Stop These Medications    bisacodyl 10 MG suppository  Commonly known as:  DULCOLAX     ipratropium-albuterol 0.5-2.5 mg/3 ml nebulizer  Commonly known as:  DUO-NEB     magnesium hydroxide 400 MG/5ML suspension  Commonly known as:  MILK OF MAGNESIA     mirtazapine 15 MG tablet  Commonly known as:  REMERON     polyethylene glycol packet  Commonly known as:  MIRALAX     QUEtiapine 25 MG tablet  Commonly known as:  SEROquel              Discharge Disposition:  Hospice/Medical Facility (DC - External)    Discharge Diet:  Diet Instructions     Diet: Dysphagia, Specialty Diet; Nectar / Syrup Thick Liquids; Mechanical Soft; Vegetarian       Discharge Diet:   Dysphagia  Specialty Diet       Fluid Consistency:  Nectar / Syrup Thick Liquids    Pureed Options:  Mechanical Soft    Specialty Diets:  Vegetarian    Allows dairy products and eggs          Discharge Activity:   Activity Instructions     Activity as Tolerated             Code Status/Level of Support:  Code Status and Medical Interventions:   Ordered at: 07/15/18 1006     Limited Support to NOT Include:    Artificial Nutrition    Intubation    Cardioversion/Defibrillation    NIPPV  (Non-Invasive Positive Pressure Support)     Code Status:    No CPR     Medical Interventions (Level of Support Prior to Arrest):    Limited     Comments:    Living will reviewed       No future appointments.    Additional Instructions for the Follow-ups that You Need to Schedule     Discharge Follow-up with PCP    As directed      Currently Documented PCP:  No Known Provider  PCP Phone Number:  None    Follow Up Details:  as needed               Time Spent on Discharge:  40 minutes    Electronically signed by TERESA Mayo, 07/18/18, 10:35 AM.

## 2018-07-18 NOTE — PROGRESS NOTES
Continued Stay Note  [facility]     Patient Name: Arabella Gomes  MRN: [id number]  Today's Date: 7/18/2018    Admit Date: 7/10/2018          Discharge Plan     Row Name 07/18/18 1720       Plan    Plan Transfer to [facility] with [facility] team    Patient/Family in Agreement with Plan yes    Final Discharge Disposition Code 70 - other health care institution not elsewhere defined    Final Note Transferred to [facility] private pay with [facility] team.  Update to [place] Intake.                Discharge Codes    No documentation.       Expected Discharge Date and Time     Expected Discharge Date Expected Discharge Time    Jul 18, 2018             Deborah Medrano RN

## 2018-07-18 NOTE — DISCHARGE PLACEMENT REQUEST
"Arabella Gomes  (89 y.o. Female)   To [facility]  From Kati Martin [phone]    Date of Birth Social Security Number Address Home Phone MRN    02/26/1927  [address] [facility] [address] [phone] [id number]    [employer] Mar[employer] Status          Mu-ism Single       Admission Date Admission Type Admitting Provider Attending Provider Department, Room/Bed    7/10/18 Emergency Ganesh Dinh MD [place], Masoud, MD [facility] 5G, S553/1    Discharge Date Discharge Disposition Discharge Destination         Hospice/Medical Facility (DC - External)              Attending Provider:  Ganesh Dinh MD    Allergies:  No Known Allergies    Isolation:  None   Infection:  None   Code Status:  No CPR    Ht:  162.6 cm (64\")   Wt:  58.5 kg (129 lb)    Admission Cmt:  None   Principal Problem:  Sepsis (CMS/[facility]) [A41.9]                 Active Insurance as of 7/10/2018     Primary Coverage     Payor Plan Insurance Group Employer/Plan Group    MEDICARE MEDICARE A & B      Payor Plan Address Payor Plan Phone Number Effective From Effective To    [address] [phone] 2/1/1992     [facility] [id number]       Subscriber Name Subscriber Birth Date Member ID       ARABELLA GOMES 2/26/1927 [id number]           Secondary Coverage     Payor Plan Insurance Group Employer/Plan Group    ANTHEM BLUE CROSS ANTHEM BLUE CROSS BLUE SHIELD PPO [id number]     Payor Plan Address Payor Plan Phone Number Effective From Effective To    [address] [phone] 1/1/2018     [facility] [id number]       Subscriber Name Subscriber Birth Date Member ID       ARABELLA GOMES 2/26/1927 [id number]                 Emergency Contacts      (Rel.) Home Phone Work Phone Mobile Phone    Toby Gomes (Brother) [phone] -- [phone]    Toby Gomes (Brother) [phone] -- --    Elizabeth Gage (Relative) -- -- [phone]                 Discharge Summary      TERESA Mayo at 7/18/2018  8:44 " JUAQUIN              [facility] Medicine Services  DISCHARGE SUMMARY    Patient Name: Arabella Gomes  : 1927  MRN: [id number]    Date of Admission: 7/10/2018  Date of Discharge:  2018  Primary Care Physician: No Known Provider    Consults     Date and Time Order Name Status Description    2018 1204 Inpatient Palliative Care MD Consult Completed         Hospital Course     Presenting Problem:   Sepsis (CMS/[facility]) [A41.9]    Active Hospital Problems    Diagnosis Date Noted   • **Sepsis (CMS/HCC) [A41.9] 07/10/2018   • Acute cystitis with hematuria [N30.01] 07/10/2018   • Right lower lobe pneumonia (CMS/[facility]) [J18.1] 07/10/2018   • Fever [R50.9] 07/10/2018   • Hypotension [I95.9] 07/10/2018   • CKD (chronic kidney disease) stage 3, GFR 30-59 ml/min [N18.3]    • Acute kidney injury (CMS/HCC) [N17.9] 2016   • Alzheimer's disease [G30.9] 2016      Resolved Hospital Problems    Diagnosis Date Noted Date Resolved   No resolved problems to display.          Hospital Course:  Arabella Gomes is a 89 y.o. female who presented to the ED from a memory care assisted living facility with AMS and lethargy on 7/10/18.  The patient was found to have a UTI.  She met sepsis criteria and was started on IV vancomycin and zosyn.  She was deescalated from vanc/zosyn to augmentin and completed a 7 day course.  Her urine culture showed a low yield yeast only.  The patient was also treated for a RLL pneumonia.  Her lethargy and mental status waxed and waned.  She has had very poor oral intake.  Her family has opted for hospice care.  This is likely due to slow natural decline.  Her family has accepted a self pay bed at [facility] and she will be transferred there today at 1230 by ambulance.  [facility] will follow her at the nursing facility.        Day of Discharge     HPI:   Resting in bed with eyes closed.  Arouses to voice.  Denies discomfort but her replies are  inconsistent and gargled.    Review of Systems  Unable to obtain due to altered mental status    Otherwise ROS is negative except as mentioned in the HPI.    Vital Signs:   Temp:  [97 °F (36.1 °C)-98.3 °F (36.8 °C)] 97 °F (36.1 °C)  Heart Rate:  [] 91  Resp:  [17] 17  BP: (155-160)/(81-91) 160/91     Physical Exam:  Constitutional: No acute distress, asleep but arouses to voice, no family at bedside  HENT: NCAT, mucous membranes dry  Respiratory: Clear to auscultation bilaterally, shallow respirations but unlabored breathing.   Cardiovascular: RRR, no murmurs, rubs, or gallops, palpable pedal pulses bilaterally  Gastrointestinal: Positive bowel sounds, soft, nontender, nondistended  Musculoskeletal: No bilateral ankle edema  Psychiatric: Flat affect, cooperative  Neurologic: Oriented x1, generalized weakness,  speech garbled  Skin: No rashes, loose fragile skin      Pertinent  and/or Most Recent Results       Results from last 7 days  Lab Units 07/15/18  0436 07/14/18  2353 07/14/18  0647 07/13/18  0633 07/12/18  0458   WBC 10*3/mm3  --   --  6.82 5.49 5.33   HEMOGLOBIN g/dL  --   --  9.2* 8.3* 7.9*   HEMATOCRIT %  --   --  27.8* 24.9* 24.4*   PLATELETS 10*3/mm3  --   --  124* 120* 107*   SODIUM mmol/L  --   --  147* 145 144   POTASSIUM mmol/L 3.9 3.3* 3.2* 3.9 3.4*   CHLORIDE mmol/L  --   --  112* 113* 116*   CO2 mmol/L  --   --  26.0 24.0 19.0*   BUN mg/dL  --   --  9 10 13   CREATININE mg/dL  --   --  1.15 1.03 1.06   GLUCOSE mg/dL  --   --  98 90 66*   CALCIUM mg/dL  --   --  7.7* 7.7* 7.7*           Invalid input(s): PROT, LABALBU        Invalid input(s): TG, LDLCALC, LDLREALC      Brief Urine Lab Results  (Last result in the past 365 days)      Color   Clarity   Blood   Leuk Est   Nitrite   Protein   CREAT   Urine HCG        07/10/18 1223 Yellow Cloudy(A) Moderate (2+)(A) Large (3+)(A) Negative 30 mg/dL (1+)(A)               Microbiology Results Abnormal     Procedure Component Value - Date/Time    Blood  Culture - Blood, [[id number]] Collected:  07/10/18 1240    Lab Status:  Final result Specimen:  Blood from Arm, Left Updated:  07/15/18 1315     Blood Culture No growth at 5 days      Aerobic bottle only    Blood Culture - Blood, [[id number]]  (Normal) Collected:  07/10/18 1250    Lab Status:  Final result Specimen:  Blood from Wrist, Left Updated:  07/15/18 1315     Blood Culture No growth at 5 days    Urine Culture - Urine, Urine, Catheter [[id number]]  (Abnormal) Collected:  07/10/18 1223    Lab Status:  Final result Specimen:  Urine from Urine, Catheter Updated:  07/13/18 1257     Urine Culture 20,000-30,000 CFU/mL Candida albicans (A)    Influenza Antigen, Rapid - Swab, Nasopharynx [[id number]]  (Normal) Collected:  07/10/18 1224    Lab Status:  Final result Specimen:  Swab from Nasopharynx Updated:  07/10/18 1247     Influenza A Ag, EIA Negative     Influenza B Ag, EIA Negative          Imaging Results (all)     Procedure Component Value Units Date/Time    XR Chest 1 View [[id number]] Collected:  07/15/18 1325     Updated:  07/15/18 1510    Narrative:       EXAMINATION: XR CHEST, SINGLE VIEW - 7/15/2018     INDICATION:  R41.82-Altered mental status, unspecified; A41.9-Sepsis,  unspecified organism; N39.0-Urinary tract infection, site not specified;  R31.9-Hematuria, unspecified; N28.9-Disorder of kidney and ureter,  unspecified; D64.9-Anemia, unspecified; R13.10-Dysphagia, unspecified;  Z74.09-Other reduced mobility. Lethargy.     TECHNIQUE:  Single view frontal chest.     COMPARISONS: 7/10/2018.     FINDINGS:  Improvement in the right lower lobe opacification. No sizable  effusion. No pneumothorax. Unchanged cardiopericardial silhouette.  Atherosclerosis.       Impression:       Improved right lower lobe opacification.     DICTATED:   7/15/2018  EDITED/ls :   7/15/2018      This report was finalized on 7/15/2018 3:08 PM by Rafael García.       CT Head Without Contrast [[id number]] Collected:  07/11/18 0802     Updated:   07/11/18 0916    Narrative:       EXAMINATION: CT HEAD WO CONTRAST- 07/10/2018     INDICATION: Mental status change, unresponsive.     TECHNIQUE: Multiple axial CT imaging was obtained of the head from the  skull base to the skull vertex without the administration of intravenous  contrast.     The radiation dose reduction device was turned on for each scan per the  ALARA (As Low as Reasonably Achievable) protocol.     COMPARISON: None.     FINDINGS: There is atrophy identified of the brain. Some low-density  area is seen in the periventricular white matter suggesting chronic  small vessel ischemic change. No hemorrhage or hydrocephalus. No mass,  mass effect, or midline shift. No abnormal extraaxial fluid collection  is identified. The bony structures reveal no evidence of osseous  abnormality. Visualized paranasal sinuses are clear. The mastoid air  cells are patent.       Impression:       Atrophy and chronic changes seen within the brain with no  acute intracranial abnormality identified.     D:  07/10/2018  E:  07/11/2018     This report was finalized on 7/11/2018 9:14 AM by Dr. Quiana Whalen MD.       XR Chest 1 View [[id number]] Collected:  07/10/18 1409     Updated:  07/10/18 1411    Narrative:       EXAMINATION: XR CHEST 1 VW- 07/10/2018     INDICATION: AMS; R41.82-Altered mental status, unspecified;  A41.9-Sepsis, unspecified organism; N39.0-Urinary tract infection, site  not specified; R31.9-Hematuria, unspecified; N28.9-Disorder of kidney  and ureter, unspecified; D64.9-Anemia, unspecified      COMPARISON: 06/16/2018     FINDINGS: Portable chest reveals patchy ill-defined opacification seen  within the right lower lung field slightly worsened when compared to the  prior study. Minimal increased markings identified at the left lung  base. Degenerative changes seen within the spine. Vascular  calcifications seen within the thoracic aorta.           Impression:       Slight worsening identified of the  markings at the right  lung base. Heart is borderline enlarged. The remainder of the lung  fields are clear.     D:  07/10/2018  E:  07/10/2018     This report was finalized on 7/10/2018 2:09 PM by Dr. Quiana Whalen MD.                              Discharge Details        Discharge Medications      Continue These Medications      Instructions Start Date   acetaminophen 325 MG tablet  Commonly known as:  TYLENOL   650 mg, Oral, Every 4 Hours PRN      docusate sodium 100 MG capsule   100 mg, Oral, 2 Times Daily      donepezil 5 MG tablet  Commonly known as:  ARICEPT   5 mg, Oral, Nightly      memantine 28 MG capsule sustained-release 24 hr extended release capsule  Commonly known as:  NAMENDA XR   28 mg, Oral, Daily      oxybutynin 5 MG tablet  Commonly known as:  DITROPAN   5 mg, Oral, Daily         Stop These Medications    bisacodyl 10 MG suppository  Commonly known as:  DULCOLAX     ipratropium-albuterol 0.5-2.5 mg/3 ml nebulizer  Commonly known as:  DUO-NEB     magnesium hydroxide 400 MG/5ML suspension  Commonly known as:  MILK OF MAGNESIA     mirtazapine 15 MG tablet  Commonly known as:  REMERON     polyethylene glycol packet  Commonly known as:  MIRALAX     QUEtiapine 25 MG tablet  Commonly known as:  SEROquel              Discharge Disposition:  Hospice/Medical Facility (DC - External)    Discharge Diet:  Diet Instructions     Diet: Dysphagia, Specialty Diet; Nectar / Syrup Thick Liquids; Mechanical Soft; Vegetarian       Discharge Diet:   Dysphagia  Specialty Diet       Fluid Consistency:  Nectar / Syrup Thick Liquids    Pureed Options:  Mechanical Soft    Specialty Diets:  Vegetarian    Allows dairy products and eggs          Discharge Activity:   Activity Instructions     Activity as Tolerated             Code Status/Level of Support:  Code Status and Medical Interventions:   Ordered at: 07/15/18 1006     Limited Support to NOT Include:    Artificial Nutrition    Intubation     Cardioversion/Defibrillation    NIPPV (Non-Invasive Positive Pressure Support)     Code Status:    No CPR     Medical Interventions (Level of Support Prior to Arrest):    Limited     Comments:    Living will reviewed       No future appointments.    Additional Instructions for the Follow-ups that You Need to Schedule     Discharge Follow-up with PCP    As directed      Currently Documented PCP:  No Known Provider  PCP Phone Number:  None    Follow Up Details:  as needed               Time Spent on Discharge:  40 minutes    Electronically signed by TERESA Mayo, 07/18/18, 10:35 AM.        Electronically signed by TERESA aMyo at 7/18/2018 10:36 AM

## 2018-07-18 NOTE — PROGRESS NOTES
Continued Stay Note  [facility]     Patient Name: Arabella Gomes  MRN: [id number]  Today's Date: 7/18/2018    Admit Date: 7/10/2018          Discharge Plan     Row Name 07/18/18 1048       Plan    Plan SNF with Hospice    Patient/Family in Agreement with Plan yes    Plan Comments Please call report to [facility] at [phone] and send copied chart, summary and any scripts for controlled meds. Ambulance to transport at 1230.              Discharge Codes    No documentation.       Expected Discharge Date and Time     Expected Discharge Date Expected Discharge Time    Jul 18, 2018             Kati Martin RN

## 2018-07-18 NOTE — PLAN OF CARE
Problem: Fall Risk (Adult)  Goal: Absence of Fall  Outcome: Ongoing (interventions implemented as appropriate)   07/18/18 0452   Fall Risk (Adult)   Absence of Fall making progress toward outcome       Problem: Skin Injury Risk (Adult)  Goal: Skin Health and Integrity  Outcome: Ongoing (interventions implemented as appropriate)   07/18/18 0452   Skin Injury Risk (Adult)   Skin Health and Integrity making progress toward outcome       Problem: Patient Care Overview  Goal: Plan of Care Review  Outcome: Ongoing (interventions implemented as appropriate)   07/18/18 0452   Coping/Psychosocial   Plan of Care Reviewed With patient   Plan of Care Review   Progress no change   OTHER   Outcome Summary Pt sleeps a lot but is easily aroused and attempts to communicate. Pt spits out meds and applesauce. Safety and skin integrity interventions continued. Plan is for discharge to the [facility] today.   Coping/Psychosocial   Patient Agreement with Plan of Care unable to participate       Problem: Infection, Risk/Actual (Adult)  Goal: Infection Prevention/Resolution  Outcome: Ongoing (interventions implemented as appropriate)   07/18/18 0452   Infection, Risk/Actual (Adult)   Infection Prevention/Resolution making progress toward outcome

## 2018-07-18 NOTE — PROGRESS NOTES
[facility] Medicine Services  PROGRESS NOTE    Patient Name: Arabella Gomes  : 1927  MRN: [id number]    Date of Admission: 7/10/2018  Length of Stay: 7  Primary Care Physician: No Known Provider    Subjective   Subjective     CC:  F/U AMS    HPI:  Resting in bed in no acute distress looks comfortable and the family is at the bedside.     Review of Systems  Unable to obtain due to mental status    Otherwise ROS is negative except as mentioned in the HPI.    Objective   Objective     Vital Signs:   Temp:  [98.3 °F (36.8 °C)-98.6 °F (37 °C)] 98.3 °F (36.8 °C)  Heart Rate:  [] 100  Resp:  [17-18] 17  BP: (155-163)/(81-97) 155/81        Physical Exam:  Gen-no acute distress, cachetic elderly frail female  CV-RRR, S1 S2 normal, no m/r/g  Resp-CTAB with poor effort and decreased bases, no wheezes  Abd-soft, NT, ND, +BS  Ext-no edema  Neuro-lethargic, will open eyes briefly, no focal deficits      Results Reviewed:  I have personally reviewed current lab, radiology, and data and agree.      Results from last 7 days  Lab Units 18  0647 18  0633 18  0458   WBC 10*3/mm3 6.82 5.49 5.33   HEMOGLOBIN g/dL 9.2* 8.3* 7.9*   HEMATOCRIT % 27.8* 24.9* 24.4*   PLATELETS 10*3/mm3 124* 120* 107*       Results from last 7 days  Lab Units 07/15/18  0436 18  2353 18  0647 18  0633 18  0458   SODIUM mmol/L  --   --  147* 145 144   POTASSIUM mmol/L 3.9 3.3* 3.2* 3.9 3.4*   CHLORIDE mmol/L  --   --  112* 113* 116*   CO2 mmol/L  --   --  26.0 24.0 19.0*   BUN mg/dL  --   --  9 10 13   CREATININE mg/dL  --   --  1.15 1.03 1.06   GLUCOSE mg/dL  --   --  98 90 66*   CALCIUM mg/dL  --   --  7.7* 7.7* 7.7*     Estimated Creatinine Clearance: 29.4 mL/min (by C-G formula based on SCr of 1.15 mg/dL).  No results found for: BNP    Microbiology Results Abnormal     Procedure Component Value - Date/Time    Blood Culture - Blood, [[id number]] Collected:  07/10/18 1240    Lab  Status:  Final result Specimen:  Blood from Arm, Left Updated:  07/15/18 1315     Blood Culture No growth at 5 days      Aerobic bottle only    Blood Culture - Blood, [[id number]]  (Normal) Collected:  07/10/18 1250    Lab Status:  Final result Specimen:  Blood from Wrist, Left Updated:  07/15/18 1315     Blood Culture No growth at 5 days    Urine Culture - Urine, Urine, Catheter [[id number]]  (Abnormal) Collected:  07/10/18 1223    Lab Status:  Final result Specimen:  Urine from Urine, Catheter Updated:  07/13/18 1257     Urine Culture 20,000-30,000 CFU/mL Candida albicans (A)    Influenza Antigen, Rapid - Swab, Nasopharynx [[id number]]  (Normal) Collected:  07/10/18 1224    Lab Status:  Final result Specimen:  Swab from Nasopharynx Updated:  07/10/18 1247     Influenza A Ag, EIA Negative     Influenza B Ag, EIA Negative          Imaging Results (last 24 hours)     ** No results found for the last 24 hours. **             I have reviewed the medications.    Assessment/Plan   Assessment / Plan     Hospital Problem List     * (Principal)Sepsis (CMS/[facility])    Alzheimer's disease (Chronic)    Acute kidney injury (CMS/[facility])    CKD (chronic kidney disease) stage 3, GFR 30-59 ml/min    Acute cystitis with hematuria    Right lower lobe pneumonia (CMS/[facility])    Fever    Hypotension             Brief Hospital Course to date:  Arabella Gomes is a 89 y.o. female with PMH significant for Alzheimer's dementia and CKD III. Admitted to [facility] 6/16-6/20/18 with confusion, fevers/chills and cough. Treated for pneumonia. Respiratory PCR (+) rhinovirus. Discharged on Doxycycline. She returned to [facility] ED 7/10/18 with AMS. Evaluation revealed sepsis secondary to UTI and RLL pneumonia.       Assessment & Plan:  --Blood cultures NGTD. Deescalated from Vanc/Zosyn to just Zosyn then transitioned to Augmentin to complete 7 day course. Urine culture with low yield yeast only and no urine sx's, will not treat.  --Replace K+  --Continues with wax/wane  lethargy and poor PO intake. No sedating meds have been administered.  Clinically her sepsis has resolved, CXR better. As d/w family, suspect multiple hospital stays, her dementia, and advanced age contributes and she may be on her natural decline.  Goal is to attempt rehab portential at skilled rehab and transition back to her Memory Care unit if able in future but based on current status she may continue to slowly decline -->  Will consult Palliative Med to help with family decisions for her care and consider full comfort and possibly Hospice at a SNF  --PT/OT following. Planning to go to SNF prior to returning to her memory care unit however may not be interactive enough for skilled rehab. CM to follow up.    DVT Prophylaxis:  [facility]    CODE STATUS:   Code Status and Medical Interventions:   Ordered at: 07/15/18 1006     Limited Support to NOT Include:    Artificial Nutrition    Intubation    Cardioversion/Defibrillation    NIPPV (Non-Invasive Positive Pressure Support)     Code Status:    No CPR     Medical Interventions (Level of Support Prior to Arrest):    Limited     Comments:    Living will reviewed       Disposition: I expect the patient to be discharged to possibly to SNF soon.    Electronically signed by Ganesh Dinh MD, 07/17/18, 9:16 PM.

## 2018-12-23 NOTE — PLAN OF CARE
Problem: Fall Risk (Adult)  Goal: Absence of Fall  Outcome: Ongoing (interventions implemented as appropriate)   07/17/18 1512   Fall Risk (Adult)   Absence of Fall making progress toward outcome       Problem: Skin Injury Risk (Adult)  Goal: Skin Health and Integrity  Outcome: Ongoing (interventions implemented as appropriate)   07/17/18 1512   Skin Injury Risk (Adult)   Skin Health and Integrity making progress toward outcome       Problem: Infection, Risk/Actual (Adult)  Goal: Infection Prevention/Resolution  Outcome: Ongoing (interventions implemented as appropriate)   07/17/18 1512   Infection, Risk/Actual (Adult)   Infection Prevention/Resolution making progress toward outcome       Problem: Patient Care Overview  Goal: Plan of Care Review  Outcome: Ongoing (interventions implemented as appropriate)   07/17/18 1512   Coping/Psychosocial   Plan of Care Reviewed With family   Plan of Care Review   Progress no change     Goal: Individualization and Mutuality  Outcome: Ongoing (interventions implemented as appropriate)   07/15/18 1649   Individualization   Patient Specific Preferences continue to provide reassurance with bathroom needs, patient very modest   Patient Specific Goals (Include Timeframe) patient will attempt to assist in ADL's, eat at least 25% of each meal   Patient Specific Interventions encourage any effort to participate in ADL's     Goal: Discharge Needs Assessment  Outcome: Ongoing (interventions implemented as appropriate)   07/10/18 1700 07/11/18 1100 07/15/18 1649   Discharge Needs Assessment   Patient/Family Anticipates Transition to --  --  inpatient hospice   Patient/Family Anticipated Services at Transition  --  --    Transportation Anticipated public transportation --  --    Equipment Needed After Discharge --  none --    Disability   Equipment Currently Used at Home --  wheelchair --           Stable

## 2020-03-27 NOTE — PLAN OF CARE
Problem: Fall Risk (Adult)  Goal: Identify Related Risk Factors and Signs and Symptoms  Outcome: Ongoing (interventions implemented as appropriate)   07/11/18 0419   Fall Risk (Adult)   Related Risk Factors (Fall Risk) age-related changes;confusion/agitation;gait/mobility problems;history of falls;environment unfamiliar     Goal: Absence of Fall  Outcome: Ongoing (interventions implemented as appropriate)   07/11/18 0419   Fall Risk (Adult)   Absence of Fall making progress toward outcome       Problem: Skin Injury Risk (Adult)  Goal: Identify Related Risk Factors and Signs and Symptoms  Outcome: Ongoing (interventions implemented as appropriate)   07/11/18 0419   Skin Injury Risk (Adult)   Related Risk Factors (Skin Injury Risk) advanced age;cognitive impairment;moisture;mobility impaired;fluid intake inadequate;infection     Goal: Skin Health and Integrity  Outcome: Ongoing (interventions implemented as appropriate)   07/11/18 0419   Skin Injury Risk (Adult)   Skin Health and Integrity making progress toward outcome       Problem: Patient Care Overview  Goal: Plan of Care Review  Outcome: Ongoing (interventions implemented as appropriate)   07/11/18 0419   Coping/Psychosocial   Plan of Care Reviewed With patient;sibling   Plan of Care Review   Progress improving   OTHER   Outcome Summary Vitals are stable. Pain is easily arousable and communicating. Speech understadable. Good urine output (incontinent). Skin integrity maintained.        Problem: Infection, Risk/Actual (Adult)  Goal: Identify Related Risk Factors and Signs and Symptoms  Outcome: Ongoing (interventions implemented as appropriate)   07/11/18 0419   Infection, Risk/Actual (Adult)   Related Risk Factors (Infection, Risk/Actual) age extremes;chronic illness/condition;exposure to microbes;skin integrity impairment   Signs and Symptoms (Infection, Risk/Actual) lab value changes     Goal: Infection Prevention/Resolution  Outcome: Ongoing (interventions  implemented as appropriate)   07/11/18 0419   Infection, Risk/Actual (Adult)   Infection Prevention/Resolution making progress toward outcome          family

## 2022-12-16 NOTE — PLAN OF CARE
Problem: Palliative Care (Adult)  Goal: Identify Related Risk Factors and Signs and Symptoms  Outcome: Ongoing (interventions implemented as appropriate)   07/15/18 1648   Palliative Care (Adult)   Palliative Care: Related Risk Factors advanced age;condition is progressive   Palliative Care: Signs and Symptoms confusion;fatigue;loss of appetite     Goal: Maximized Comfort  Outcome: Ongoing (interventions implemented as appropriate)   07/15/18 1648   Palliative Care (Adult)   Maximized Comfort making progress toward outcome          No